# Patient Record
Sex: FEMALE | Race: WHITE | NOT HISPANIC OR LATINO | Employment: OTHER | URBAN - METROPOLITAN AREA
[De-identification: names, ages, dates, MRNs, and addresses within clinical notes are randomized per-mention and may not be internally consistent; named-entity substitution may affect disease eponyms.]

---

## 2019-05-22 ENCOUNTER — TELEPHONE (OUTPATIENT)
Dept: VASCULAR SURGERY | Facility: CLINIC | Age: 74
End: 2019-05-22

## 2019-05-22 DIAGNOSIS — Z01.818 PRE-OP EVALUATION: Primary | ICD-10-CM

## 2019-05-22 NOTE — TELEPHONE ENCOUNTER
Contacted patient to schedule appt with Dr. Williamson for carotid workup. Pt states she was referred to Dr. Williamson by Dr. Sergio Leal. Appts scheduled, pt verified. Appt letter placed in mail.

## 2019-06-11 ENCOUNTER — HOSPITAL ENCOUNTER (OUTPATIENT)
Dept: VASCULAR SURGERY | Facility: CLINIC | Age: 74
Discharge: HOME | End: 2019-06-11
Attending: SURGERY
Payer: MEDICARE

## 2019-06-11 ENCOUNTER — OFFICE VISIT (OUTPATIENT)
Dept: VASCULAR SURGERY | Facility: CLINIC | Age: 74
End: 2019-06-11
Payer: MEDICARE

## 2019-06-11 VITALS
TEMPERATURE: 98 F | WEIGHT: 185.19 LBS | HEART RATE: 85 BPM | SYSTOLIC BLOOD PRESSURE: 149 MMHG | BODY MASS INDEX: 28.07 KG/M2 | DIASTOLIC BLOOD PRESSURE: 71 MMHG | HEIGHT: 68 IN

## 2019-06-11 DIAGNOSIS — I65.23 BILATERAL CAROTID ARTERY STENOSIS: Primary | ICD-10-CM

## 2019-06-11 DIAGNOSIS — I65.23 BILATERAL CAROTID ARTERY STENOSIS: ICD-10-CM

## 2019-06-11 DIAGNOSIS — Z01.818 PRE-OP EVALUATION: ICD-10-CM

## 2019-06-11 PROCEDURE — 99205 OFFICE O/P NEW HI 60 MIN: CPT | Mod: S$PBB,,, | Performed by: SURGERY

## 2019-06-11 PROCEDURE — 99999 PR PBB SHADOW E&M-EST. PATIENT-LVL III: CPT | Mod: PBBFAC,,, | Performed by: SURGERY

## 2019-06-11 PROCEDURE — 93880 PR DUPLEX SCAN EXTRACRANIAL,BILAT: ICD-10-PCS | Mod: 26,S$PBB,, | Performed by: SURGERY

## 2019-06-11 PROCEDURE — 99213 OFFICE O/P EST LOW 20 MIN: CPT | Mod: PBBFAC,25 | Performed by: SURGERY

## 2019-06-11 PROCEDURE — 93880 EXTRACRANIAL BILAT STUDY: CPT | Mod: PBBFAC | Performed by: SURGERY

## 2019-06-11 PROCEDURE — 99205 PR OFFICE/OUTPT VISIT, NEW, LEVL V, 60-74 MIN: ICD-10-PCS | Mod: S$PBB,,, | Performed by: SURGERY

## 2019-06-11 PROCEDURE — 99999 PR PBB SHADOW E&M-EST. PATIENT-LVL III: ICD-10-PCS | Mod: PBBFAC,,, | Performed by: SURGERY

## 2019-06-11 PROCEDURE — 93880 EXTRACRANIAL BILAT STUDY: CPT | Mod: 26,S$PBB,, | Performed by: SURGERY

## 2019-06-11 RX ORDER — IMIQUIMOD 12.5 MG/.25G
CREAM TOPICAL
COMMUNITY
Start: 2019-05-07 | End: 2021-12-15

## 2019-06-11 RX ORDER — CANAGLIFLOZIN 300 MG/1
300 TABLET, FILM COATED ORAL DAILY
COMMUNITY
Start: 2019-06-06

## 2019-06-11 RX ORDER — FLUOXETINE HYDROCHLORIDE 20 MG/1
20 CAPSULE ORAL DAILY
COMMUNITY
Start: 2019-05-02

## 2019-06-11 RX ORDER — BEPOTASTINE BESILATE 15 MG/ML
SOLUTION/ DROPS OPHTHALMIC
COMMUNITY
Start: 2019-05-10 | End: 2023-03-15 | Stop reason: ALTCHOICE

## 2019-06-11 RX ORDER — BUPROPION HYDROCHLORIDE 150 MG/1
TABLET ORAL DAILY
COMMUNITY
Start: 2019-06-10 | End: 2021-02-23

## 2019-06-11 RX ORDER — LEVOTHYROXINE SODIUM 100 UG/1
100 TABLET ORAL
COMMUNITY
Start: 2019-05-24 | End: 2019-09-11

## 2019-06-11 RX ORDER — METFORMIN HYDROCHLORIDE 500 MG/1
500 TABLET, EXTENDED RELEASE ORAL 4 TIMES DAILY PRN
COMMUNITY
Start: 2019-05-25

## 2019-06-11 RX ORDER — LIOTHYRONINE SODIUM 5 UG/1
5 TABLET ORAL DAILY
COMMUNITY
Start: 2019-05-24

## 2019-06-11 RX ORDER — ESTRADIOL 10 UG/1
TABLET VAGINAL DAILY
COMMUNITY
Start: 2019-05-23

## 2019-06-11 RX ORDER — CHOLECALCIFEROL (VITAMIN D3) 25 MCG
1000 TABLET ORAL DAILY
COMMUNITY

## 2019-06-11 RX ORDER — ZOLPIDEM TARTRATE 6.25 MG/1
6.25 TABLET, FILM COATED, EXTENDED RELEASE ORAL NIGHTLY PRN
COMMUNITY
Start: 2019-06-10 | End: 2021-02-23 | Stop reason: SDUPTHER

## 2019-06-11 RX ORDER — ASPIRIN 81 MG/1
81 TABLET ORAL DAILY
COMMUNITY

## 2019-06-11 RX ORDER — SPIRONOLACTONE 100 MG/1
50 TABLET, FILM COATED ORAL DAILY
COMMUNITY
Start: 2019-06-03 | End: 2023-07-14

## 2019-06-11 RX ORDER — EZETIMIBE 10 MG/1
TABLET ORAL DAILY
COMMUNITY
Start: 2019-05-16 | End: 2023-10-23

## 2019-06-11 NOTE — PROGRESS NOTES
REFERRING PHYSICIAN:  Dr. Sergio Leal (vascular surgeon, [place]).    HISTORY OF PRESENT ILLNESS:  A 74-year-old female recently relocated from   [place] to [place], sent for evaluation of carotid artery disease.  She has   no history of stroke, TIA or amaurosis.  Recent CT scan done for other purposes   incidentally showed significant left common carotid artery disease, per history   stenotic versus occluded (I do not have these images).    She takes aspirin only intermittently.  She is on statin; however, her most   recent LDL is 109.    PAST MEDICAL HISTORY:  1.  Extramammary Paget's disease.  2.  Diabetes.    FAMILY HISTORY:  Positive for colon cancer.    SOCIAL HISTORY:  She is a nonsmoker.    MEDICATIONS:  Include aspirin taken three to four times a day 81 mg and Zetia.    See EPIC for full list.    ALLERGIES:  None.    REVIEW OF SYSTEMS:  Denies postprandial pain or DVT.  All other systems   including eyes, ENT, respiratory, musculoskeletal, psychiatric, heme, lymph,   allergy and immune are negative.    PHYSICAL EXAMINATION:  VITAL SIGNS:  See nursing notes.  GENERAL:  She is in no acute distress.  RESPIRATORY:  Normal effort.  Clear to auscultation.  CARDIAC:  Regular rate and rhythm, nondisplaced PMI, no murmur.  VASCULAR:  2+ radial and brachial pulses.  ABDOMEN:  No masses or tenderness.  No hepatosplenomegaly.  Aorta cannot be   palpated.  EYES:  Normal conjunctivae and lids.  ENT:  Fair dentition.  NECK:  No JVD or thyromegaly.  MUSCULOSKELETAL:  No kyphosis or scoliosis.  EXTREMITIES:  Without clubbing or cyanosis.  SKIN:  Warm and dry.  NEUROLOGIC:  Alert and oriented x3.  Normal mood and affect.  Midline tongue.    No speech difficulty or hoarseness.  5/5 motor strength in all extremities.    IMAGING:  Carotid duplex shows minimal right carotid stenosis with peak systolic   velocity of 130 and diastolic of 30.  On the left there is a 60% to 70% ICA   stenosis with peak systolic velocity of 183  and diastolic of only 38, ratio of   3.7.  By duplex, there appears to be high grade stenosis versus occlusion of the   left proximal common carotid artery.  Collateralization is noted.    ASSESSMENT:  Moderate left internal carotid disease, with possible proximal severe common   carotid artery stenosis versus occlusion, neurologically asymptomatic.    Given her completely asymptomatic state and only 60% to 70% ICA stenosis would   not treat proximal stenosis de devin.  Thus, I am not going to order additional   imaging (a CTA) to further interrogate this area.    She needs good medical therapy.  I reassured that she does not require a   surgical or interventional therapy at this time.    RECOMMENDATIONS:  1.  Increase aspirin 81 mg to daily dose.  2.  Refer to Dr. Aldo Rao to optimize her treatment of her LDL.  3.  Followup with me in six months for the screening carotid duplex sooner if   clinically indicated.      SEJAL/ANTONIO  dd: 06/11/2019 16:39:54 (CDT)  td: 06/12/2019 05:21:02 (CDT)  Doc ID   #[id number]  Job ID #[id number]    CC: Sergio Leal

## 2019-06-18 PROBLEM — Z78.9 STATIN INTOLERANCE: Status: ACTIVE | Noted: 2019-06-18

## 2019-06-18 PROBLEM — E66.3 OVERWEIGHT (BMI 25.0-29.9): Status: ACTIVE | Noted: 2019-06-18

## 2019-06-18 PROBLEM — E03.4 HYPOTHYROIDISM DUE TO ACQUIRED ATROPHY OF THYROID: Status: ACTIVE | Noted: 2019-06-18

## 2019-06-18 PROBLEM — E08.59 DIABETES MELLITUS DUE TO UNDERLYING CONDITION WITH CIRCULATORY COMPLICATION: Status: ACTIVE | Noted: 2019-06-18

## 2019-06-18 PROBLEM — E78.5 DYSLIPIDEMIA: Status: ACTIVE | Noted: 2019-06-18

## 2019-07-07 NOTE — PROGRESS NOTES
Subjective:   Patient ID:  Juan Antonio Mejia is a 74 y.o. female who presents for evaluation of dyslipidemia    HPI:The patient is here for dyslipidemia/carotid disease. The patient has no chest pain, SOB, TIA, palpitations, syncope or pre-syncope.Patient does not exercise.Recent .Says BP typically a lot lower but none taken recently.        Review of Systems   Constitution: Negative for chills, decreased appetite, diaphoresis, fever, malaise/fatigue, night sweats, weight gain and weight loss.   HENT: Negative for congestion, hoarse voice, nosebleeds, sore throat and tinnitus.    Eyes: Negative for blurred vision, double vision, vision loss in left eye, vision loss in right eye, visual disturbance and visual halos.   Cardiovascular: Negative for chest pain, claudication, cyanosis, dyspnea on exertion, irregular heartbeat, leg swelling, near-syncope, orthopnea, palpitations, paroxysmal nocturnal dyspnea and syncope.   Respiratory: Negative for cough, hemoptysis, shortness of breath, sleep disturbances due to breathing, snoring, sputum production and wheezing.    Endocrine: Negative for cold intolerance, heat intolerance, polydipsia, polyphagia and polyuria.   Hematologic/Lymphatic: Negative for adenopathy and bleeding problem. Does not bruise/bleed easily.   Skin: Negative for color change, dry skin, flushing, itching, nail changes, poor wound healing, rash, skin cancer, suspicious lesions and unusual hair distribution.   Musculoskeletal: Negative for arthritis, back pain, falls, gout, joint pain, joint swelling, muscle cramps, muscle weakness, myalgias and stiffness.   Gastrointestinal: Negative for abdominal pain, anorexia, change in bowel habit, constipation, diarrhea, dysphagia, heartburn, hematemesis, hematochezia, melena and vomiting.   Genitourinary: Negative for decreased libido, dysuria, hematuria, hesitancy and urgency.   Neurological: Negative for excessive daytime sleepiness, dizziness, focal weakness,  "headaches, light-headedness, loss of balance, numbness, paresthesias, seizures, sensory change, tremors, vertigo and weakness.   Psychiatric/Behavioral: Negative for altered mental status, depression, hallucinations, memory loss, substance abuse and suicidal ideas. The patient does not have insomnia and is not nervous/anxious.    Allergic/Immunologic: Negative for environmental allergies and hives.       Objective: BP (!) 145/83   Pulse 83   Ht 5' 8" (1.727 m)   Wt 84.9 kg (187 lb 2.7 oz)   BMI 28.46 kg/m²      Physical Exam   Constitutional: She is oriented to person, place, and time. She appears well-developed and well-nourished.   HENT:   Head: Normocephalic.   Eyes: Pupils are equal, round, and reactive to light. EOM are normal.   Neck: Normal range of motion. Normal carotid pulses, no hepatojugular reflux and no JVD present. Carotid bruit is not present. No thyromegaly present.   Cardiovascular: Normal rate, regular rhythm, normal heart sounds and intact distal pulses. Exam reveals no gallop and no friction rub.   No murmur heard.  Pulmonary/Chest: Effort normal and breath sounds normal. No tachypnea. No respiratory distress. She has no wheezes. She has no rales. She exhibits no tenderness.   Abdominal: Soft. Bowel sounds are normal. She exhibits no distension and no mass. There is no tenderness. There is no rebound and no guarding.   Musculoskeletal: Normal range of motion. She exhibits no edema or tenderness.   Lymphadenopathy:     She has no cervical adenopathy.   Neurological: She is alert and oriented to person, place, and time. No cranial nerve deficit. Coordination normal.   Skin: Skin is warm. No rash noted. No erythema.   Psychiatric: She has a normal mood and affect. Her behavior is normal. Judgment and thought content normal.       Assessment:     1. Dyslipidemia    2. Statin intolerance    3. Hypothyroidism due to acquired atrophy of thyroid    4. Overweight (BMI 25.0-29.9)    5. Diabetes " mellitus due to underlying condition with other circulatory complication, unspecified whether long term insulin use    6. Bilateral carotid artery stenosis    7. Vitamin D deficiency    8. At risk for coronary artery disease        Plan:   Discussed diet , achieving and maintaining ideal body weight, and exercise.   We reviewed meds in detail.  Reassured-Discussed Goals, options, plans  Discussed CAC testing, options for lipids etc  Omega 3 > 800/d EPA/DHA  Discussed Co Q 10 200-400 mg per day  Discussed adding [id number] if higher statin not tolerated  Co Q 10 200 mg per day and 2 weeks later start Prava 40 mg at nite  Will need D if low  Stress in September if CAC high  Let us know if BP > 130/80 and will start Losartan  Juan Antonio was seen today for carotid artery disease.    Diagnoses and all orders for this visit:    Dyslipidemia  -     Lipid panel; Standing  -     Comprehensive metabolic panel; Standing  -     TSH; Standing  -     pravastatin (PRAVACHOL) 40 MG tablet; Take 1 tablet (40 mg total) by mouth once daily.  -     CT Cardiac Scoring; Future; Expected date: 07/09/2019  -     CT Cardiac Scoring; Future; Expected date: 07/08/2019    Statin intolerance  -     pravastatin (PRAVACHOL) 40 MG tablet; Take 1 tablet (40 mg total) by mouth once daily.  -     CT Cardiac Scoring; Future; Expected date: 07/09/2019  -     CK; Standing  -     Vitamin D; Future; Expected date: 07/09/2019  -     CT Cardiac Scoring; Future; Expected date: 07/08/2019    Hypothyroidism due to acquired atrophy of thyroid  -     TSH; Standing  -     T4, free; Standing    Overweight (BMI 25.0-29.9)    Diabetes mellitus due to underlying condition with other circulatory complication, unspecified whether long term insulin use  -     CT Cardiac Scoring; Future; Expected date: 07/08/2019  -     Hemoglobin A1c; Future; Expected date: 07/08/2019    Bilateral carotid artery stenosis  -     Lipid panel; Standing  -     Comprehensive metabolic panel;  Standing  -     CT Cardiac Scoring; Future; Expected date: 07/09/2019    Vitamin D deficiency  -     Vitamin D; Future; Expected date: 07/09/2019    At risk for coronary artery disease  -     CT Cardiac Scoring; Future; Expected date: 07/09/2019  -     CT Cardiac Scoring; Future; Expected date: 07/08/2019            Follow up in about 15 months (around 10/8/2020) for with labs; labs 12 weeks; CAC and D soon.

## 2019-07-08 ENCOUNTER — OFFICE VISIT (OUTPATIENT)
Dept: CARDIOLOGY | Facility: CLINIC | Age: 74
End: 2019-07-08
Payer: MEDICARE

## 2019-07-08 VITALS
HEIGHT: 68 IN | BODY MASS INDEX: 28.37 KG/M2 | SYSTOLIC BLOOD PRESSURE: 145 MMHG | WEIGHT: 187.19 LBS | HEART RATE: 83 BPM | DIASTOLIC BLOOD PRESSURE: 83 MMHG

## 2019-07-08 DIAGNOSIS — E78.5 DYSLIPIDEMIA: Primary | ICD-10-CM

## 2019-07-08 DIAGNOSIS — Z91.89 AT RISK FOR CORONARY ARTERY DISEASE: ICD-10-CM

## 2019-07-08 DIAGNOSIS — E66.3 OVERWEIGHT (BMI 25.0-29.9): ICD-10-CM

## 2019-07-08 DIAGNOSIS — I65.23 BILATERAL CAROTID ARTERY STENOSIS: ICD-10-CM

## 2019-07-08 DIAGNOSIS — E55.9 VITAMIN D DEFICIENCY: ICD-10-CM

## 2019-07-08 DIAGNOSIS — E08.59 DIABETES MELLITUS DUE TO UNDERLYING CONDITION WITH OTHER CIRCULATORY COMPLICATION, UNSPECIFIED WHETHER LONG TERM INSULIN USE: ICD-10-CM

## 2019-07-08 DIAGNOSIS — E03.4 HYPOTHYROIDISM DUE TO ACQUIRED ATROPHY OF THYROID: ICD-10-CM

## 2019-07-08 DIAGNOSIS — Z78.9 STATIN INTOLERANCE: ICD-10-CM

## 2019-07-08 PROCEDURE — 99215 OFFICE O/P EST HI 40 MIN: CPT | Mod: PBBFAC | Performed by: INTERNAL MEDICINE

## 2019-07-08 PROCEDURE — 99999 PR PBB SHADOW E&M-EST. PATIENT-LVL V: ICD-10-PCS | Mod: PBBFAC,,, | Performed by: INTERNAL MEDICINE

## 2019-07-08 PROCEDURE — 99999 PR PBB SHADOW E&M-EST. PATIENT-LVL V: CPT | Mod: PBBFAC,,, | Performed by: INTERNAL MEDICINE

## 2019-07-08 PROCEDURE — 99204 OFFICE O/P NEW MOD 45 MIN: CPT | Mod: S$PBB,,, | Performed by: INTERNAL MEDICINE

## 2019-07-08 PROCEDURE — 99204 PR OFFICE/OUTPT VISIT, NEW, LEVL IV, 45-59 MIN: ICD-10-PCS | Mod: S$PBB,,, | Performed by: INTERNAL MEDICINE

## 2019-07-08 RX ORDER — PRAVASTATIN SODIUM 40 MG/1
40 TABLET ORAL DAILY
Qty: 90 TABLET | Refills: 3 | Status: SHIPPED | OUTPATIENT
Start: 2019-07-08 | End: 2020-07-07

## 2019-07-08 NOTE — PATIENT INSTRUCTIONS
Discussed diet , achieving and maintaining ideal body weight, and exercise.   We reviewed meds in detail.  Reassured-Discussed Goals, options, plans  Discussed CAC testing, options for lipids etc  Omega 3 > 800/d EPA/DHA  Discussed Co Q 10 200-400 mg per day to allow for statins  Discussed adding [id number] if higher statin not tolerated  Co Q 10 200 mg per day and 2 weeks later start Prava 40 mg at nite  Will need D if low  Stress in September if CAC high  Let us know if BP > 130/80 and will start Losartan

## 2019-07-08 NOTE — LETTER
July 8, 2019      SANTI Williamson III, MD  [address]  [facility] [address]           [facility]  [address]  [facility] [address]  Phone: [phone]          Patient: Juan Antonio Mejia   MR Number: [id number]   YOB: 1945   Date of Visit: 7/8/2019       Dear Dr. SANTI Williamson III:    Thank you for referring Juan Antonio Mejia to me for evaluation. Attached you will find relevant portions of my assessment and plan of care.    If you have questions, please do not hesitate to call me. I look forward to following Juan Antonio Mejia along with you.    Sincerely,    Jeff Rao MD    Enclosure  CC:  No Recipients    If you would like to receive this communication electronically, please contact [email or web address] or [phone] to request more information on [employer] Link access.    For providers and/or their staff who would like to refer a patient to [facility], please contact us through our one-stop-shop provider referral line, [facility], at [phone].    If you feel you have received this communication in error or would no longer like to receive these types of communications, please e-mail [email or web address]

## 2019-07-15 ENCOUNTER — OFFICE VISIT (OUTPATIENT)
Dept: OBSTETRICS AND GYNECOLOGY | Facility: CLINIC | Age: 74
End: 2019-07-15
Payer: MEDICARE

## 2019-07-15 VITALS
SYSTOLIC BLOOD PRESSURE: 142 MMHG | HEIGHT: 68 IN | BODY MASS INDEX: 28.57 KG/M2 | DIASTOLIC BLOOD PRESSURE: 66 MMHG | WEIGHT: 188.5 LBS

## 2019-07-15 DIAGNOSIS — N89.8 VAGINAL IRRITATION: Primary | ICD-10-CM

## 2019-07-15 DIAGNOSIS — Z00.00 ROUTINE MEDICAL EXAM: ICD-10-CM

## 2019-07-15 PROCEDURE — 87480 CANDIDA DNA DIR PROBE: CPT

## 2019-07-15 PROCEDURE — 87510 GARDNER VAG DNA DIR PROBE: CPT

## 2019-07-15 PROCEDURE — G0101 PR CA SCREEN;PELVIC/BREAST EXAM: ICD-10-PCS | Mod: S$GLB,,, | Performed by: OBSTETRICS & GYNECOLOGY

## 2019-07-15 PROCEDURE — G0101 CA SCREEN;PELVIC/BREAST EXAM: HCPCS | Mod: S$GLB,,, | Performed by: OBSTETRICS & GYNECOLOGY

## 2019-07-15 RX ORDER — BIMATOPROST 0.3 MG/ML
SOLUTION/ DROPS OPHTHALMIC NIGHTLY
COMMUNITY
Start: 2019-06-21 | End: 2023-11-29

## 2019-07-15 RX ORDER — SEMAGLUTIDE 1.34 MG/ML
INJECTION, SOLUTION SUBCUTANEOUS WEEKLY
COMMUNITY
Start: 2019-06-19

## 2019-07-15 NOTE — PROGRESS NOTES
CC: 75 yo here to establish care    HPI: Christiano is overall well today.  She is here today to establish care. She has a history of extra mammary pagets disease and is followed by an oncologist in [place]. She would like to have a gynecologist in [place] in the event that she needs something, but she is primarily managed in [place]. She is up to date on other screening including mammogram, colonoscopy, etc. Having some vulvar itching and wanted to be evaluated. Also needs a picture for her physician in [facility] ([facility] She is currently being treated with aldara and overall having a good response.     Past Medical History:   Diagnosis Date    Diabetes     metformin and diet controlled    Paget disease, extra mammary     affecting vulva       Past Surgical History:   Procedure Laterality Date     SECTION         OB History    None         Current Outpatient Medications on File Prior to Visit   Medication Sig Dispense Refill    aspirin (ECOTRIN) 81 MG EC tablet Take 81 mg by mouth once daily.       BELVIQ 10 mg Tab       BEPREVE 1.5 % Drop       buPROPion (WELLBUTRIN XL) 150 MG TB24 tablet once daily.       ezetimibe (ZETIA) 10 mg tablet once daily.       FLUoxetine 20 MG capsule 20 mg once daily.       imiquimod (ALDARA) 5 % cream       INVOKANA 300 mg Tab tablet 300 mg once daily.       LATISSE 0.03 % ophthalmic solution       levothyroxine (SYNTHROID) 100 MCG tablet 100 mcg before breakfast.       liothyronine (CYTOMEL) 5 MCG Tab       metFORMIN (GLUCOPHAGE-XR) 500 MG 24 hr tablet 500 mg 4 (four) times daily as needed.       OZEMPIC 0.25 mg or 0.5 mg(2 mg/1.5 mL) PnIj       pravastatin (PRAVACHOL) 40 MG tablet Take 1 tablet (40 mg total) by mouth once daily. 90 tablet 3    spironolactone (ALDACTONE) 100 MG tablet       vitamin D (VITAMIN D3) 1000 units Tab Take 1,000 Units by mouth once daily.      YUVAFEM 10 mcg Tab once daily.       zolpidem (AMBIEN CR) 6.25 MG CR tablet  "6.25 mg nightly as needed.        No current facility-administered medications on file prior to visit.          ROS:  GENERAL: Denies weight gain or weight loss. Feeling well overall.   SKIN: See HPI  HEAD: Denies head injury or headache.   CHEST: Denies chest pain or shortness of breath.   CARDIOVASCULAR: Denies palpitations or left sided chest pain.   ABDOMEN: No abdominal pain   REPRODUCTIVE: See HPI.   HEMATOLOGIC: No easy bruisability or excessive bleeding.     Physical Exam:   BP (!) 142/66   Ht 5' 8" (1.727 m)   Wt 85.5 kg (188 lb 7.9 oz)   BMI 28.66 kg/m²   General: No distress, well appearing  HEENT: normocephalic, atraumatic   Heart: Regular rate  Lungs: No increased work of breathing  MS: lower extremeties symmetrical, no edema  Pelvic Exam:     GENITALIA: There is diffuse erythema of the labia minora and majora with some overlying white patchy areas consistent with pagets disease of the vulva. Per patient, disease is currently under good control with aldara. Picture taken with patients permission for her to send to her gyn oncologist in [facility]. There is also thinning of the labia and narrowing of the introitus.    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 Currently being managed by gyn oncology in [place]. Here to establish care.    1. Offered referral to gyn oncology here at [facility]. She would like to continue vulvar care with her gyn oncologist in [place].  2. Affirm collected and sent      Anne Brown MD  Obstetrics and Gynecology  [facility]    "

## 2019-07-16 ENCOUNTER — TELEPHONE (OUTPATIENT)
Dept: OBSTETRICS AND GYNECOLOGY | Facility: CLINIC | Age: 74
End: 2019-07-16

## 2019-07-16 ENCOUNTER — TELEPHONE (OUTPATIENT)
Dept: VASCULAR SURGERY | Facility: CLINIC | Age: 74
End: 2019-07-16

## 2019-07-16 LAB
BACTERIAL VAGINOSIS DNA: NEGATIVE
CANDIDA GLABRATA DNA: NEGATIVE
CANDIDA KRUSEI DNA: NEGATIVE
CANDIDA RRNA VAG QL PROBE: POSITIVE
T VAGINALIS RRNA GENITAL QL PROBE: NEGATIVE

## 2019-07-16 RX ORDER — FLUCONAZOLE 150 MG/1
150 TABLET ORAL
Qty: 2 TABLET | Refills: 0 | Status: SHIPPED | OUTPATIENT
Start: 2019-07-16 | End: 2021-06-28 | Stop reason: SDUPTHER

## 2019-07-16 NOTE — TELEPHONE ENCOUNTER
Contacted patient in response to missed call. Pt states she needs to reschedule appts that were missed on Friday for CT calcium scoring and lab. Notified patient that those tests were ordered by Dr. Rao. Pt states she mixed up Dr. Rao and Dr. Williamson and states she needs to schedule FU with Dr. Williamson. Notified patient she is due to be seen in vascular surgery for a 6 month FU which will not be until December 2019 and that patient will received recall letter in mail to schedule appt as vascular surgery schedule does not go past four months. Pt verbalized understanding and states she will call to schedule when she receives letter. Pt's call transferred to phone room operators to connect with Dr. Rao's clinic.

## 2019-07-16 NOTE — TELEPHONE ENCOUNTER
Left voice message for patient to schedule appointment from referral to Internal Medicine (Dr Angelika Alvarez).  Frankie WHITE  [phone]

## 2019-07-17 ENCOUNTER — TELEPHONE (OUTPATIENT)
Dept: OBSTETRICS AND GYNECOLOGY | Facility: CLINIC | Age: 74
End: 2019-07-17

## 2019-07-17 NOTE — TELEPHONE ENCOUNTER
Called patient to give the results of her swab. Let patient know that medication was sent over to her pharmacy and she should take one today, and another in 72 hours if needed. Patient verbalized understanding.    -Domenica

## 2019-07-17 NOTE — TELEPHONE ENCOUNTER
----- Message from Anne Brown MD sent at 7/16/2019  4:49 PM CDT -----  Do you mind letting her know that her swab showed a yeast infection? I will send a prescription for diflucan for her. She can take 1 pill today and another in 72 hours if needed. Thanks!

## 2019-07-17 NOTE — TELEPHONE ENCOUNTER
----- Message from Karine Avina sent at 7/17/2019  3:35 PM CDT -----  Contact: self  Pt is returning a phone call. Pt can be reached at [phone].

## 2019-07-23 ENCOUNTER — HOSPITAL ENCOUNTER (OUTPATIENT)
Dept: RADIOLOGY | Facility: HOSPITAL | Age: 74
Discharge: HOME | End: 2019-07-23
Attending: INTERNAL MEDICINE
Payer: MEDICARE

## 2019-07-23 DIAGNOSIS — E08.59 DIABETES MELLITUS DUE TO UNDERLYING CONDITION WITH OTHER CIRCULATORY COMPLICATION, UNSPECIFIED WHETHER LONG TERM INSULIN USE: ICD-10-CM

## 2019-07-23 DIAGNOSIS — Z78.9 STATIN INTOLERANCE: ICD-10-CM

## 2019-07-23 DIAGNOSIS — E78.5 DYSLIPIDEMIA: ICD-10-CM

## 2019-07-23 DIAGNOSIS — Z91.89 AT RISK FOR CORONARY ARTERY DISEASE: ICD-10-CM

## 2019-07-23 PROBLEM — R93.1 AGATSTON CAC SCORE, >400: Status: ACTIVE | Noted: 2019-07-23

## 2019-07-23 PROCEDURE — 75571 CT HRT W/O DYE W/CA TEST: CPT | Mod: 26,,, | Performed by: RADIOLOGY

## 2019-07-23 PROCEDURE — 75571 CT CALCIUM SCORING CARDIAC: ICD-10-PCS | Mod: 26,,, | Performed by: RADIOLOGY

## 2019-07-23 PROCEDURE — 75571 CT HRT W/O DYE W/CA TEST: CPT | Mod: TC

## 2019-08-05 ENCOUNTER — TELEPHONE (OUTPATIENT)
Dept: CARDIOLOGY | Facility: CLINIC | Age: 74
End: 2019-08-05

## 2019-08-05 DIAGNOSIS — R93.1 ELEVATED CORONARY ARTERY CALCIUM SCORE: Primary | ICD-10-CM

## 2019-08-08 ENCOUNTER — LAB VISIT (OUTPATIENT)
Dept: LAB | Facility: HOSPITAL | Age: 74
End: 2019-08-08
Attending: INTERNAL MEDICINE
Payer: MEDICARE

## 2019-08-08 DIAGNOSIS — E03.4 HYPOTHYROIDISM DUE TO ACQUIRED ATROPHY OF THYROID: ICD-10-CM

## 2019-08-08 DIAGNOSIS — Z78.9 STATIN INTOLERANCE: ICD-10-CM

## 2019-08-08 DIAGNOSIS — I65.23 BILATERAL CAROTID ARTERY STENOSIS: ICD-10-CM

## 2019-08-08 DIAGNOSIS — E78.5 DYSLIPIDEMIA: ICD-10-CM

## 2019-08-08 LAB
ALBUMIN SERPL BCP-MCNC: 3.9 G/DL (ref 3.5–5.2)
ALP SERPL-CCNC: 81 U/L (ref 55–135)
ALT SERPL W/O P-5'-P-CCNC: 26 U/L (ref 10–44)
ANION GAP SERPL CALC-SCNC: 10 MMOL/L (ref 8–16)
AST SERPL-CCNC: 18 U/L (ref 10–40)
BILIRUB SERPL-MCNC: 0.4 MG/DL (ref 0.1–1)
BUN SERPL-MCNC: 12 MG/DL (ref 8–23)
CALCIUM SERPL-MCNC: 9.8 MG/DL (ref 8.7–10.5)
CHLORIDE SERPL-SCNC: 105 MMOL/L (ref 95–110)
CHOLEST SERPL-MCNC: 131 MG/DL (ref 120–199)
CHOLEST/HDLC SERPL: 2.7 {RATIO} (ref 2–5)
CK SERPL-CCNC: 51 U/L (ref 20–180)
CO2 SERPL-SCNC: 26 MMOL/L (ref 23–29)
CREAT SERPL-MCNC: 0.8 MG/DL (ref 0.5–1.4)
EST. GFR  (AFRICAN AMERICAN): >60 ML/MIN/1.73 M^2
EST. GFR  (NON AFRICAN AMERICAN): >60 ML/MIN/1.73 M^2
GLUCOSE SERPL-MCNC: 96 MG/DL (ref 70–110)
HDLC SERPL-MCNC: 49 MG/DL (ref 40–75)
HDLC SERPL: 37.4 % (ref 20–50)
LDLC SERPL CALC-MCNC: 58.8 MG/DL (ref 63–159)
NONHDLC SERPL-MCNC: 82 MG/DL
POTASSIUM SERPL-SCNC: 4.6 MMOL/L (ref 3.5–5.1)
PROT SERPL-MCNC: 7.3 G/DL (ref 6–8.4)
SODIUM SERPL-SCNC: 141 MMOL/L (ref 136–145)
T4 FREE SERPL-MCNC: 0.96 NG/DL (ref 0.71–1.51)
TRIGL SERPL-MCNC: 116 MG/DL (ref 30–150)
TSH SERPL DL<=0.005 MIU/L-ACNC: 0.16 UIU/ML (ref 0.4–4)

## 2019-08-08 PROCEDURE — 84439 ASSAY OF FREE THYROXINE: CPT

## 2019-08-08 PROCEDURE — 84443 ASSAY THYROID STIM HORMONE: CPT

## 2019-08-08 PROCEDURE — 82550 ASSAY OF CK (CPK): CPT

## 2019-08-08 PROCEDURE — 80053 COMPREHEN METABOLIC PANEL: CPT

## 2019-08-08 PROCEDURE — 80061 LIPID PANEL: CPT

## 2019-08-08 PROCEDURE — 36415 COLL VENOUS BLD VENIPUNCTURE: CPT | Mod: PO

## 2019-08-09 ENCOUNTER — PATIENT MESSAGE (OUTPATIENT)
Dept: CARDIOLOGY | Facility: CLINIC | Age: 74
End: 2019-08-09

## 2019-08-12 ENCOUNTER — TELEPHONE (OUTPATIENT)
Dept: CARDIOLOGY | Facility: CLINIC | Age: 74
End: 2019-08-12

## 2019-08-12 ENCOUNTER — PATIENT MESSAGE (OUTPATIENT)
Dept: CARDIOLOGY | Facility: CLINIC | Age: 74
End: 2019-08-12

## 2019-08-13 NOTE — TELEPHONE ENCOUNTER
Returned patient's call from this afternoon. She wanted to schedule the stress echo in Sept as Dr. Rao requested. She also had more questions about the calcium score. She will send Dr. Rao a message through [facility].    NICOLE [employer] Staff   Caller: patient called (Today,  1:22 PM)             Rosa the patient is returning your phone called. Please call [phone]. Thank you.

## 2019-08-19 ENCOUNTER — OFFICE VISIT (OUTPATIENT)
Dept: OBSTETRICS AND GYNECOLOGY | Facility: CLINIC | Age: 74
End: 2019-08-19
Payer: MEDICARE

## 2019-08-19 VITALS
BODY MASS INDEX: 26.4 KG/M2 | SYSTOLIC BLOOD PRESSURE: 180 MMHG | HEIGHT: 68 IN | DIASTOLIC BLOOD PRESSURE: 76 MMHG | WEIGHT: 174.19 LBS

## 2019-08-19 DIAGNOSIS — C51.9 PAGET'S DISEASE OF VULVA: Primary | ICD-10-CM

## 2019-08-19 PROCEDURE — 99213 OFFICE O/P EST LOW 20 MIN: CPT | Mod: S$GLB,,, | Performed by: OBSTETRICS & GYNECOLOGY

## 2019-08-19 PROCEDURE — 87481 CANDIDA DNA AMP PROBE: CPT | Mod: 59

## 2019-08-19 PROCEDURE — 87801 DETECT AGNT MULT DNA AMPLI: CPT

## 2019-08-19 PROCEDURE — 99213 PR OFFICE/OUTPT VISIT, EST, LEVL III, 20-29 MIN: ICD-10-PCS | Mod: S$GLB,,, | Performed by: OBSTETRICS & GYNECOLOGY

## 2019-08-19 RX ORDER — MUPIROCIN 20 MG/G
OINTMENT TOPICAL
COMMUNITY
Start: 2019-08-09 | End: 2019-09-11

## 2019-08-19 RX ORDER — LEVOTHYROXINE SODIUM 88 UG/1
88 TABLET ORAL
COMMUNITY
Start: 2019-08-14 | End: 2021-03-23 | Stop reason: SDUPTHER

## 2019-08-19 NOTE — PROGRESS NOTES
CC: 75 yo here for fu visit     HPI: Christiano is overall well today.  She is here for fu visit. She has a history of extra mammary pagets disease and is followed by an oncologist in [place]. She would like to have a gynecologist in [place] in the event that she needs something, but she is primarily managed in [place]. She is up to date on other screening including mammogram, colonoscopy, etc. Having some vulvar itching and wanted to be evaluated. Also needs a picture for her physician in [facility] ([facility] She is currently being treated with aldara and overall having a good response.     Interval history: Here today in follow up. I saw her last and she was diagnosed with yeast infection. She is here today for fu visit. Followed by Dr. Tapia in [place]. He is okay with her coming to this clinic for fu and to obtain picture that she will send to him. Continues on Aldara but nearing end of treatment course.     Had DEXA scan within the last 15 years that was normal. Does not want repeat as she would not do any treatment for osteoperosis. She does not do weight bearing exercises.    She is scheduled to see Dr. Alvarez on  to establish care.     Going to [place] on vacation.     Past Medical History:   Diagnosis Date    Diabetes     metformin and diet controlled    Paget disease, extra mammary     affecting vulva       Past Surgical History:   Procedure Laterality Date     SECTION         OB History    None         Current Outpatient Medications on File Prior to Visit   Medication Sig Dispense Refill    aspirin (ECOTRIN) 81 MG EC tablet Take 81 mg by mouth once daily.       BELVIQ 10 mg Tab       BEPREVE 1.5 % Drop       buPROPion (WELLBUTRIN XL) 150 MG TB24 tablet once daily.       ezetimibe (ZETIA) 10 mg tablet once daily.       fluconazole (DIFLUCAN) 150 MG Tab Take 1 tablet (150 mg total) by mouth every 72 hours as needed. 2 tablet 0    FLUoxetine 20 MG capsule 20 mg once daily.     "   imiquimod (ALDARA) 5 % cream       INVOKANA 300 mg Tab tablet 300 mg once daily.       LATISSE 0.03 % ophthalmic solution       levothyroxine (SYNTHROID) 100 MCG tablet 100 mcg before breakfast.       levothyroxine (SYNTHROID) 88 MCG tablet       liothyronine (CYTOMEL) 5 MCG Tab       metFORMIN (GLUCOPHAGE-XR) 500 MG 24 hr tablet 500 mg 4 (four) times daily as needed.       mupirocin (BACTROBAN) 2 % ointment       OZEMPIC 0.25 mg or 0.5 mg(2 mg/1.5 mL) PnIj       PAZEO 0.7 % Drop       pravastatin (PRAVACHOL) 40 MG tablet Take 1 tablet (40 mg total) by mouth once daily. 90 tablet 3    spironolactone (ALDACTONE) 100 MG tablet       vitamin D (VITAMIN D3) 1000 units Tab Take 1,000 Units by mouth once daily.      YUVAFEM 10 mcg Tab once daily.       zolpidem (AMBIEN CR) 6.25 MG CR tablet 6.25 mg nightly as needed.        No current facility-administered medications on file prior to visit.        ROS:  GENERAL: Denies weight gain or weight loss. Feeling well overall.   SKIN: See HPI  HEAD: Denies head injury or headache.   CHEST: Denies chest pain or shortness of breath.   CARDIOVASCULAR: Denies palpitations or left sided chest pain.   ABDOMEN: No abdominal pain   REPRODUCTIVE: See HPI.   HEMATOLOGIC: No easy bruisability or excessive bleeding.     Physical Exam:   BP (!) 180/76   Ht 5' 8" (1.727 m)   Wt 79 kg (174 lb 2.6 oz)   BMI 26.48 kg/m²   General: No distress, well appearing  HEENT: normocephalic, atraumatic   Heart: Regular rate  Lungs: No increased work of breathing  MS: lower extremeties symmetrical, no edema  Pelvic Exam:     GENITALIA: There is diffuse erythema of the labia minora and majora with some overlying white patchy areas consistent with pagets disease of the vulva. Somewhat improved from prior exam. Picture taken with patients permission for her to send to her gyn oncologist in [place]. There is also thinning of the labia and narrowing of the introitus.    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 Currently being managed by gyn oncology in [place]. Here for fu visit.     1. Offered referral to gyn oncology here at [facility] ([facility] She would like to continue vulvar care with her gyn oncologist in [place], Dr. Tapia. She is currently being managed with Aldara.   2. Affirm collected and sent.  3. BP elevated today. Was 142/66 at prior visit. She feels well today. Scheduled to see Dr. Alvarez to establish care on 09/11.  4. Declines repeat DEXA scan. Recommended weight bearing exercises.   5. Fu for routine exam.     Anne Brown MD  Obstetrics and Gynecology  [facility]

## 2019-08-20 RX ORDER — FLUCONAZOLE 150 MG/1
150 TABLET ORAL
Qty: 2 TABLET | Refills: 0 | Status: SHIPPED | OUTPATIENT
Start: 2019-08-20 | End: 2019-09-11 | Stop reason: SDUPTHER

## 2019-09-11 ENCOUNTER — OFFICE VISIT (OUTPATIENT)
Dept: INTERNAL MEDICINE | Facility: CLINIC | Age: 74
End: 2019-09-11
Payer: MEDICARE

## 2019-09-11 VITALS
OXYGEN SATURATION: 95 % | WEIGHT: 186.75 LBS | SYSTOLIC BLOOD PRESSURE: 140 MMHG | DIASTOLIC BLOOD PRESSURE: 80 MMHG | HEART RATE: 93 BPM | HEIGHT: 68 IN | BODY MASS INDEX: 28.3 KG/M2

## 2019-09-11 DIAGNOSIS — I10 ESSENTIAL HYPERTENSION: Primary | ICD-10-CM

## 2019-09-11 DIAGNOSIS — R73.03 PREDIABETES: ICD-10-CM

## 2019-09-11 DIAGNOSIS — J45.20 MILD INTERMITTENT ASTHMA WITHOUT COMPLICATION: ICD-10-CM

## 2019-09-11 DIAGNOSIS — L81.4 SOLAR LENTIGO: ICD-10-CM

## 2019-09-11 DIAGNOSIS — Z00.00 HEALTHCARE MAINTENANCE: ICD-10-CM

## 2019-09-11 DIAGNOSIS — E03.4 HYPOTHYROIDISM DUE TO ACQUIRED ATROPHY OF THYROID: ICD-10-CM

## 2019-09-11 DIAGNOSIS — E55.9 VITAMIN D DEFICIENCY: ICD-10-CM

## 2019-09-11 DIAGNOSIS — C51.9 PAGET'S DISEASE OF VULVA: ICD-10-CM

## 2019-09-11 DIAGNOSIS — I65.23 BILATERAL CAROTID ARTERY STENOSIS: ICD-10-CM

## 2019-09-11 DIAGNOSIS — F32.A DEPRESSION, UNSPECIFIED DEPRESSION TYPE: ICD-10-CM

## 2019-09-11 DIAGNOSIS — E66.3 OVERWEIGHT (BMI 25.0-29.9): ICD-10-CM

## 2019-09-11 DIAGNOSIS — G47.00 INSOMNIA, UNSPECIFIED TYPE: ICD-10-CM

## 2019-09-11 DIAGNOSIS — E78.5 DYSLIPIDEMIA: ICD-10-CM

## 2019-09-11 DIAGNOSIS — Z23 FLU VACCINE NEED: ICD-10-CM

## 2019-09-11 DIAGNOSIS — Z13.820 SCREENING FOR OSTEOPOROSIS: ICD-10-CM

## 2019-09-11 DIAGNOSIS — Z12.11 COLON CANCER SCREENING: ICD-10-CM

## 2019-09-11 PROBLEM — E11.59 TYPE 2 DIABETES MELLITUS WITH CIRCULATORY DISORDER, WITHOUT LONG-TERM CURRENT USE OF INSULIN: Status: ACTIVE | Noted: 2019-06-18

## 2019-09-11 PROCEDURE — 99214 OFFICE O/P EST MOD 30 MIN: CPT | Mod: PBBFAC | Performed by: INTERNAL MEDICINE

## 2019-09-11 PROCEDURE — 99999 PR PBB SHADOW E&M-EST. PATIENT-LVL IV: ICD-10-PCS | Mod: PBBFAC,,, | Performed by: INTERNAL MEDICINE

## 2019-09-11 PROCEDURE — 99205 PR OFFICE/OUTPT VISIT, NEW, LEVL V, 60-74 MIN: ICD-10-PCS | Mod: S$PBB,,, | Performed by: INTERNAL MEDICINE

## 2019-09-11 PROCEDURE — 99205 OFFICE O/P NEW HI 60 MIN: CPT | Mod: S$PBB,,, | Performed by: INTERNAL MEDICINE

## 2019-09-11 PROCEDURE — 99999 PR PBB SHADOW E&M-EST. PATIENT-LVL IV: CPT | Mod: PBBFAC,,, | Performed by: INTERNAL MEDICINE

## 2019-09-11 RX ORDER — TOPIRAMATE 25 MG/1
25 TABLET ORAL DAILY
COMMUNITY
Start: 2019-09-10 | End: 2021-02-23

## 2019-09-11 NOTE — PROGRESS NOTES
Subjective:       Patient ID: Juan Antonio Mejia is a 74 y.o. female who  has a past medical history of Depression, Diabetes, HLD (hyperlipidemia), Mild intermittent asthma, and Paget disease, extra mammary.    Chief Complaint: Establish Care and Hypertension     History was obtained from the patient and supplemented through chart review.  -The patient has not seen a PCP in our system.  Used to go to .   -Following with Cardiology for HLD.  -Reviewed outside records from Dr. Steph Reyes in [place] regarding preDM.  H/o negative autoimmune workup.    Lived in [place], then [place]. Has some of her care in [place].    HPI    Elevated BP:    No h/o HTN.  Has been on Aldactone 50 for many years as a diuretic. Tolerating meds well.   Home BP: 130-140s  Tobacco: quit many years ago     PreDM:  Well followed with Endocrinology in [place] q6 mo.  Will see in 11/2019.  [place] A1cs 5.9, 6 9-2017.  Currently pt is taking metformin 500 XR daily, Ozempic/Semaglutide, Ivokana/Canagliflozin.     No proteinuria on OSH labs 09/2017  Retinal exams: UTD at [facility], 2 months ago  Foot exams:    No results found for: HGBA1C      HLD:  Is currently taking ASA 81 daily.  History of intolerance to statin (knee pain with Lipitor; started chondroitin).  On Zetia, started pravastatin 40. CAC score very high.  Lab Results   Component Value Date    LDLCALC 58.8 (L) 08/08/2019     The 10-year ASCVD risk score (Choteau EBONI Jr., et al., 2013) is: 23.8%    Values used to calculate the score:      Age: 74 years      Sex: Female      Is Non- : No      Diabetic: No      Tobacco smoker: No      Systolic Blood Pressure: 140 mmHg      Is BP treated: Yes      HDL Cholesterol: 49 mg/dL      Total Cholesterol: 131 mg/dL    Bilateral carotid artery stenosis:  No history of TIA, amaurosis.  Quit smoking at 39 yoa.  Was an incidental finding on CT for Paget's.  On aspirin, statin; unable to tolerate high intensity statin. Sees Cardiology, vascular  surgery.  Recommended medical therapy given moderate dz, asymptomatic.  Carotid CT score high.  Goal LDL close to 50.  Planning on stress test.     Hypothyroidism:  The patient is taking Synthroid decreased 100-> 88 last month, liothyronine 5.  Has repeat TFTs ordered.    Lab Results   Component Value Date    TSH 0.157 (L) 08/08/2019    FREET4 0.96 08/08/2019     Vitamin D deficiency:  Takes OTC supplement.  Vitamin-D 43 .   No results found for: [id number]    Extra-mammary Paget's disease:  Of the vulva.  Follows with OBGYN here and Gyn Onc in [place].  On Aldara.    Obesity:  BMI 28. Has lost weight on Belviq, Topamax, Wellbutrin.  Rx by Endocrinology in [place].     Asthma: Auit smoking at 39 years of age.  Used to be on ProAir, Advair 100-50 b.i.d., Singulair 10 q.h.s.    Depression:  On Wellbutrin 150, fluoxetine 20.     Insomnia:  On Ambien.     Colon polyp:  C scope 07/20/2015, was told to repeat in 5 years.    Solar lentigo:  Will see OSH derm    HCM:  -Had DEXA scan 15 years ago that was normal.  Did not want to repeat since she doesn't want to be on bisphosphonates.  Does not do weight-bearing exercises.  States that she eats yogurt and takes Vit D.  -MMG BI-RADS 2.  03/27/2017  -Prevnar ,   -UTD shingles 2015    Review of Systems   Constitutional: Negative for fever and unexpected weight change.   HENT: Negative for rhinorrhea and sneezing.    Eyes: Negative for redness and itching.   Respiratory: Negative for shortness of breath and wheezing.    Cardiovascular: Negative for chest pain and palpitations.   Gastrointestinal: Negative for abdominal pain and vomiting.   Genitourinary: Negative for dysuria and hematuria.   Musculoskeletal: Positive for arthralgias. Negative for gait problem.   Skin: Negative for color change and rash.   Neurological: Negative for dizziness and light-headedness.   Hematological: Negative for adenopathy.   Psychiatric/Behavioral: Positive for sleep  disturbance. Negative for confusion. The patient is not nervous/anxious.          Past Medical History:   Diagnosis Date    Depression     Diabetes     metformin and diet controlled    HLD (hyperlipidemia)     Mild intermittent asthma     Paget disease, extra mammary     affecting vulva     Past Surgical History:   Procedure Laterality Date     SECTION  1973     Family History   Problem Relation Age of Onset    Colon cancer Mother 79    Glaucoma Mother     Prostate cancer Father     Heart attack Father     Mental retardation Brother     Breast cancer Neg Hx     Diabetes Neg Hx      Social History     Socioeconomic History    Marital status:      Spouse name: Not on file    Number of children: Not on file    Years of education: Not on file    Highest education level: Not on file   Occupational History    Not on file   Social Needs    Financial resource strain: Not on file    Food insecurity:     Worry: Not on file     Inability: Not on file    Transportation needs:     Medical: Not on file     Non-medical: Not on file   Tobacco Use    Smoking status: Former Smoker     Last attempt to quit:      Years since quittin.7    Smokeless tobacco: Never Used   Substance and Sexual Activity    Alcohol use: Not Currently    Drug use: Never    Sexual activity: Not Currently   Lifestyle    Physical activity:     Days per week: Not on file     Minutes per session: Not on file    Stress: Not on file   Relationships    Social connections:     Talks on phone: Not on file     Gets together: Not on file     Attends [employer] service: Not on file     Active member of club or organization: Not on file     Attends meetings of clubs or organizations: Not on file     Relationship status: Not on file   Other Topics Concern    Not on file   Social History Narrative    Not on file     Objective:      Vitals:    19 1524   BP: (!) 140/80   Pulse: 93   SpO2: 95%   Weight: 84.7 kg (186 lb  "11.7 oz)   Height: 5' 8" (1.727 m)      Physical Exam   Constitutional: She appears well-developed and well-nourished. No distress.   HENT:   Head: Normocephalic and atraumatic.   Nose: Nose normal.   Mouth/Throat: Oropharynx is clear and moist. No oropharyngeal exudate.   Eyes: Pupils are equal, round, and reactive to light. EOM are normal. Right eye exhibits no discharge. Left eye exhibits no discharge. No scleral icterus.   Neck: Neck supple. No tracheal deviation present. No thyromegaly present.   Cardiovascular: Normal rate, regular rhythm, normal heart sounds and intact distal pulses.   No murmur heard.  Pulmonary/Chest: Effort normal and breath sounds normal. No respiratory distress. She has no wheezes.   Abdominal: Soft. Bowel sounds are normal. She exhibits no distension. There is no tenderness.   Musculoskeletal: She exhibits no edema or deformity.   Lymphadenopathy:     She has no cervical adenopathy.   Neurological: She is alert. No cranial nerve deficit. Gait normal.   Skin: Skin is warm and dry. Capillary refill takes less than 2 seconds. She is not diaphoretic. No erythema.   Psychiatric: She has a normal mood and affect. Her behavior is normal.         Lab Results   Component Value Date    CHOL 131 08/08/2019    TRIG 116 08/08/2019    HDL 49 08/08/2019    ALT 26 08/08/2019    AST 18 08/08/2019     08/08/2019    K 4.6 08/08/2019     08/08/2019    CREATININE 0.8 08/08/2019    BUN 12 08/08/2019    CO2 26 08/08/2019    TSH 0.157 (L) 08/08/2019       The 10-year ASCVD risk score (Sherita EBONI Jr., et al., 2013) is: 23.8%    Values used to calculate the score:      Age: 74 years      Sex: Female      Is Non- : No      Diabetic: No      Tobacco smoker: No      Systolic Blood Pressure: 140 mmHg      Is BP treated: Yes      HDL Cholesterol: 49 mg/dL      Total Cholesterol: 131 mg/dL    (Imaging have been independently reviewed)  Cardiac CT with high CAC    Assessment:       1. " Essential hypertension    2. Prediabetes    3. Dyslipidemia    4. Bilateral carotid artery stenosis    5. Hypothyroidism due to acquired atrophy of thyroid    6. Vitamin D deficiency    7. Paget's disease of vulva    8. Overweight (BMI 25.0-29.9)    9. Mild intermittent asthma without complication    10. Depression, unspecified depression type    11. Insomnia, unspecified type    12. Colon cancer screening    13. Solar lentigo    14. Healthcare maintenance    15. Flu vaccine need    16. Screening for osteoporosis          Plan:       Juan Antonio was seen today for establish care and hypertension.    Diagnoses and all orders for this visit:    Essential hypertension  Comments:  BP persistently high. On Aldactone 50. Rec ARB d/t preDM. Pt hesitant to start med, requested to discuss with Cards. F/u with Cards next wk. Will msg Dr. Rao.    Prediabetes  Comments:  Managed by [facility] in [place], so will defer labs/A1C. Previous A1Cs at goal. On Metformin, Ozempic, Ivokana.    Dyslipidemia  Comments:  Knee pain with Lipitor. On Pravastatin, ASA. Following with Cards.    Bilateral carotid artery stenosis  Comments:  Medical management with statin, ASA. F/u with vascular, Cards.    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 On Liothyronine 5. Has repeat TFTs ordered.  Follows with Endo in [place].    Vitamin D deficiency  Comments:  On supplement. Will defer labs since she follows with Ce in [place] in 11/2019.    Paget's disease of vulva  Comments:  F/u with OBGYN here and in [place]. On Aldara.    Overweight (BMI 25.0-29.9)  Comments:  On Belviq, Topamax, Wellbutrin.  Follows with Endocrinology in [place].    Mild intermittent asthma without complication  Comments:  no acute issues. UTD on pneumococcal vaccine.    Depression, unspecified depression type  Comments:  Cont Wellbutrin, fluoxetine.    Insomnia, unspecified type  Comments:  Continue Ambien.    Colon cancer  screening  Comments:  7/2015.  Was told to repeat in 5 years.  Refer to GI.  Orders:  -     Ambulatory referral to Gastroenterology    Solar lentigo  Comments:  Will see outside dermatologist.    Healthcare maintenance    Flu vaccine need  Comments:  Advised to obtain vaccine at Pharmacy.    Screening for osteoporosis  Comments:  Patient declined DEXA scan despite discussion of risks and benefits.    Other orders  -     Cancel: CBC auto differential; Future  -     Cancel: Comprehensive metabolic panel; Future  -     Cancel: Lipid panel; Future  -     Cancel: Microalbumin/creatinine urine ratio; Future  -     Cancel: Hemoglobin A1c; Future  -     Cancel: Ambulatory referral to Podiatry  -     Cancel: Diabetic Eye Screening Photo; Future  -     Cancel: Vitamin D; Future  -     Cancel: TSH; Future  -     Cancel: T4, free; Future         Side effects of medication(s) were discussed in detail and patient voiced understanding.  Patient will call back for any issues or complications.     RTC in 3 month(s) or sooner PRN for HTN.  Pt is unsure if she will do  medicine.

## 2019-09-12 ENCOUNTER — PATIENT MESSAGE (OUTPATIENT)
Dept: CARDIOLOGY | Facility: CLINIC | Age: 74
End: 2019-09-12

## 2019-09-12 DIAGNOSIS — I10 HYPERTENSION, UNSPECIFIED TYPE: Primary | ICD-10-CM

## 2019-09-17 ENCOUNTER — HOSPITAL ENCOUNTER (OUTPATIENT)
Dept: CARDIOLOGY | Facility: CLINIC | Age: 74
Discharge: HOME | End: 2019-09-17
Attending: INTERNAL MEDICINE
Payer: MEDICARE

## 2019-09-17 ENCOUNTER — PATIENT MESSAGE (OUTPATIENT)
Dept: CARDIOLOGY | Facility: CLINIC | Age: 74
End: 2019-09-17

## 2019-09-17 VITALS — BODY MASS INDEX: 27.28 KG/M2 | WEIGHT: 180 LBS | HEIGHT: 68 IN

## 2019-09-17 DIAGNOSIS — R93.1 ELEVATED CORONARY ARTERY CALCIUM SCORE: ICD-10-CM

## 2019-09-17 LAB
ASCENDING AORTA: 2.83 CM
BSA FOR ECHO PROCEDURE: 1.98 M2
CV ECHO LV RWT: 0.41 CM
CV STRESS BASE HR: 88 BPM
DIASTOLIC BLOOD PRESSURE: 76 MMHG
DOP CALC LVOT AREA: 3 CM2
DOP CALC LVOT DIAMETER: 1.96 CM
DOP CALC LVOT PEAK VEL: 0.95 M/S
DOP CALC LVOT STROKE VOLUME: 51.99 CM3
DOP CALCLVOT PEAK VEL VTI: 17.24 CM
E WAVE DECELERATION TIME: 255.4 MSEC
E/A RATIO: 0.63
E/E' RATIO: 10.18 M/S
ECHO LV POSTERIOR WALL: 0.84 CM (ref 0.6–1.1)
FRACTIONAL SHORTENING: 36 % (ref 28–44)
INTERVENTRICULAR SEPTUM: 0.81 CM (ref 0.6–1.1)
IVRT: 0.1 MSEC
LA MAJOR: 5.15 CM
LA MINOR: 5.27 CM
LA WIDTH: 2.54 CM
LEFT ATRIUM SIZE: 3.61 CM
LEFT ATRIUM VOLUME INDEX: 20.8 ML/M2
LEFT ATRIUM VOLUME: 40.6 CM3
LEFT INTERNAL DIMENSION IN SYSTOLE: 2.63 CM (ref 2.1–4)
LEFT VENTRICLE DIASTOLIC VOLUME INDEX: 38.65 ML/M2
LEFT VENTRICLE DIASTOLIC VOLUME: 75.54 ML
LEFT VENTRICLE MASS INDEX: 53 G/M2
LEFT VENTRICLE SYSTOLIC VOLUME INDEX: 12.9 ML/M2
LEFT VENTRICLE SYSTOLIC VOLUME: 25.21 ML
LEFT VENTRICULAR INTERNAL DIMENSION IN DIASTOLE: 4.13 CM (ref 3.5–6)
LEFT VENTRICULAR MASS: 102.65 G
LV LATERAL E/E' RATIO: 9.33 M/S
LV SEPTAL E/E' RATIO: 11.2 M/S
MV PEAK A VEL: 0.89 M/S
MV PEAK E VEL: 0.56 M/S
OHS CV CPX 1 MINUTE RECOVERY HEART RATE: 103 BPM
OHS CV CPX 85 PERCENT MAX PREDICTED HEART RATE MALE: 119
OHS CV CPX ESTIMATED METS: 6
OHS CV CPX MAX PREDICTED HEART RATE: 140
OHS CV CPX PATIENT IS FEMALE: 1
OHS CV CPX PATIENT IS MALE: 0
OHS CV CPX PEAK DIASTOLIC BLOOD PRESSURE: 60 MMHG
OHS CV CPX PEAK HEAR RATE: 120 BPM
OHS CV CPX PEAK RATE PRESSURE PRODUCT: NORMAL
OHS CV CPX PEAK SYSTOLIC BLOOD PRESSURE: 196 MMHG
OHS CV CPX PERCENT MAX PREDICTED HEART RATE ACHIEVED: 86
OHS CV CPX RATE PRESSURE PRODUCT PRESENTING: NORMAL
PISA TR MAX VEL: 2.41 M/S
PULM VEIN S/D RATIO: 1.87
PV PEAK D VEL: 0.31 M/S
PV PEAK S VEL: 0.58 M/S
RA MAJOR: 4.57 CM
RA PRESSURE: 8 MMHG
RA WIDTH: 2.38 CM
RIGHT VENTRICULAR END-DIASTOLIC DIMENSION: 3.02 CM
RV TISSUE DOPPLER FREE WALL SYSTOLIC VELOCITY 1 (APICAL 4 CHAMBER VIEW): 13.08 CM/S
SINUS: 3.23 CM
STJ: 2.51 CM
STRESS ECHO POST EXERCISE DUR MIN: 3 MINUTES
STRESS ECHO POST EXERCISE DUR SEC: 48 SECONDS
SYSTOLIC BLOOD PRESSURE: 146 MMHG
TDI LATERAL: 0.06 M/S
TDI SEPTAL: 0.05 M/S
TDI: 0.06 M/S
TR MAX PG: 23 MMHG
TRICUSPID ANNULAR PLANE SYSTOLIC EXCURSION: 1.78 CM
TV REST PULMONARY ARTERY PRESSURE: 31 MMHG

## 2019-09-17 PROCEDURE — 93351 STRESS TTE COMPLETE: CPT | Mod: PBBFAC | Performed by: INTERNAL MEDICINE

## 2019-09-17 PROCEDURE — 93351 ECHOCARDIOGRAM STRESS TEST (CUPID ONLY): ICD-10-PCS | Mod: 26,S$PBB,, | Performed by: INTERNAL MEDICINE

## 2019-09-23 ENCOUNTER — OFFICE VISIT (OUTPATIENT)
Dept: OBSTETRICS AND GYNECOLOGY | Facility: CLINIC | Age: 74
End: 2019-09-23
Payer: MEDICARE

## 2019-09-23 VITALS
WEIGHT: 176.38 LBS | DIASTOLIC BLOOD PRESSURE: 70 MMHG | HEIGHT: 68 IN | BODY MASS INDEX: 26.73 KG/M2 | SYSTOLIC BLOOD PRESSURE: 148 MMHG

## 2019-09-23 DIAGNOSIS — N76.1 CHRONIC VAGINITIS: Primary | ICD-10-CM

## 2019-09-23 PROCEDURE — 99213 PR OFFICE/OUTPT VISIT, EST, LEVL III, 20-29 MIN: ICD-10-PCS | Mod: S$GLB,,, | Performed by: OBSTETRICS & GYNECOLOGY

## 2019-09-23 PROCEDURE — 87661 TRICHOMONAS VAGINALIS AMPLIF: CPT

## 2019-09-23 PROCEDURE — 87481 CANDIDA DNA AMP PROBE: CPT | Mod: 59

## 2019-09-23 PROCEDURE — 99213 OFFICE O/P EST LOW 20 MIN: CPT | Mod: S$GLB,,, | Performed by: OBSTETRICS & GYNECOLOGY

## 2019-09-23 RX ORDER — NYSTATIN AND TRIAMCINOLONE ACETONIDE 100000; 1 [USP'U]/G; MG/G
CREAM TOPICAL
Qty: 30 G | Refills: 1 | Status: SHIPPED | OUTPATIENT
Start: 2019-09-23 | End: 2023-03-15 | Stop reason: ALTCHOICE

## 2019-09-23 NOTE — PROGRESS NOTES
CC: 73 yo here for fu visit     HPI: Christiano is overall well today.  She is here for fu visit. She has a history of extra mammary pagets disease and is followed by an oncologist in [place]. She would like to have a gynecologist in [place] in the event that she needs something, but she is primarily managed in [place]. She is up to date on other screening including mammogram, colonoscopy, etc. Having some vulvar itching and wanted to be evaluated. Also needs a picture for her physician in [facility] She is currently being treated with aldara and overall having a good response.     Interval history: Here today in follow up. I saw her last and she was diagnosed with yeast infection (both prior visits). Today is overall asymptomatic. She is now a longer course of aldara per recommendation by her physician in [place], Dr. Tapia who is a specialist in pagets disease of the vulva.     Had DEXA scan within the last 15 years that was normal. Does not want repeat as she would not do any treatment for osteoperosis. She does not do weight bearing exercises.    Seeing Dr. Alvarez for routine health maintenance.     Only complaint is some burning/discomfort at the upper portion of the vulva.     Past Medical History:   Diagnosis Date    Depression     Diabetes     metformin and diet controlled    HLD (hyperlipidemia)     Mild intermittent asthma     Paget disease, extra mammary     affecting vulva       Past Surgical History:   Procedure Laterality Date     SECTION         OB History    None         Current Outpatient Medications on File Prior to Visit   Medication Sig Dispense Refill    aspirin (ECOTRIN) 81 MG EC tablet Take 81 mg by mouth once daily.       BELVIQ 10 mg Tab       BEPREVE 1.5 % Drop       buPROPion (WELLBUTRIN XL) 150 MG TB24 tablet once daily.       ezetimibe (ZETIA) 10 mg tablet once daily.       fluconazole (DIFLUCAN) 150 MG Tab Take 1 tablet (150 mg total) by mouth every  "72 hours as needed. 2 tablet 0    FLUoxetine 20 MG capsule 20 mg once daily.       imiquimod (ALDARA) 5 % cream       INVOKANA 300 mg Tab tablet 300 mg once daily.       LATISSE 0.03 % ophthalmic solution       levothyroxine (SYNTHROID) 88 MCG tablet       liothyronine (CYTOMEL) 5 MCG Tab       metFORMIN (GLUCOPHAGE-XR) 500 MG 24 hr tablet 500 mg 4 (four) times daily as needed.       OZEMPIC 0.25 mg or 0.5 mg(2 mg/1.5 mL) PnIj       PAZEO 0.7 % Drop       pravastatin (PRAVACHOL) 40 MG tablet Take 1 tablet (40 mg total) by mouth once daily. 90 tablet 3    spironolactone (ALDACTONE) 100 MG tablet Take 50 mg by mouth once daily.       topiramate (TOPAMAX) 25 MG tablet       vitamin D (VITAMIN D3) 1000 units Tab Take 1,000 Units by mouth once daily.      YUVAFEM 10 mcg Tab once daily.       zolpidem (AMBIEN CR) 6.25 MG CR tablet 6.25 mg nightly as needed.        No current facility-administered medications on file prior to visit.        ROS:  GENERAL: Denies weight gain or weight loss. Feeling well overall.   SKIN: See HPI  HEAD: Denies head injury or headache.   CHEST: Denies chest pain or shortness of breath.   CARDIOVASCULAR: Denies palpitations or left sided chest pain.   ABDOMEN: No abdominal pain   REPRODUCTIVE: See HPI.   HEMATOLOGIC: No easy bruisability or excessive bleeding.     Physical Exam:   BP (!) 148/70   Ht 5' 8" (1.727 m)   Wt 80 kg (176 lb 5.9 oz)   BMI 26.82 kg/m²   General: No distress, well appearing  HEENT: normocephalic, atraumatic   Heart: Regular rate  Lungs: No increased work of breathing  MS: lower extremeties symmetrical, no edema  Pelvic Exam:     GENITALIA: There is diffuse erythema of the labia minora and majora with some overlying white patchy areas consistent with pagets disease of the vulva. Moderately improved from prior exam. Picture taken with patients permission for her to send to her gyn oncologist in [facility]. There is also thinning of the labia and narrowing of the " introitus.    Affirm collected and sent   URETHRA: normal appearing   VAGINA: normal vaginal mucosa, no lesions, atrophic appearing  PSYCH: Normal affect, mood appropriate       ASSESSMENT/PLAN: 75 yo here with extra mammary pagets disease of the vulva. Currently being managed by gyn oncology in [place]. Here for fu visit.     1. Offered referral to gyn oncology here at [facility]. She would like to continue vulvar care with her gyn oncologist in [place], Dr. Tapia. She is currently being managed with Aldara.   2. Affirm collected and sent. Has history of recurrent yeast infections. Currently - she is mostly asymptomatic other than some irritation at the clitoral ocampo - unclear if this is related to pagets, atrophy or candida. Currently using vagifem twice weekly. She may be interested in switching to estring and she will let us know.  3. Can try mycolog externally.  4. For recurrent candida, offered suppressive therapy vs PRN treatment. She prefers to use diflucan PRN symptoms.     Anne Brown MD  Obstetrics and Gynecology  [facility]

## 2019-09-23 NOTE — PATIENT INSTRUCTIONS
Oral fluconazole 150 mg weekly for 6 months is the first  regimen. If this regimen is not feasible, topical treatments used intermittently can also be considered. Suppressive maintenance therapies are effective in reducing RVVC. However, 30%-50% of women will have recurrent disease after maintenance therapy is discontinued. .

## 2019-09-24 ENCOUNTER — TELEPHONE (OUTPATIENT)
Dept: OBSTETRICS AND GYNECOLOGY | Facility: CLINIC | Age: 74
End: 2019-09-24

## 2019-09-24 RX ORDER — FLUCONAZOLE 150 MG/1
150 TABLET ORAL ONCE AS NEEDED
Qty: 10 TABLET | Refills: 1 | Status: SHIPPED | OUTPATIENT
Start: 2019-09-24 | End: 2019-09-24

## 2019-09-24 NOTE — TELEPHONE ENCOUNTER
Notes recorded by Milagros Do MA on 9/24/2019 at 10:44 AM CDT  Results given. She would like a handful of diflucan sent to [facility] pharmacy on file. She will just take as needed

## 2019-09-24 NOTE — TELEPHONE ENCOUNTER
----- Message from Anne Brown MD sent at 9/24/2019  8:04 AM CDT -----  Do you mind letting her know that swab was positive for yeast again. It is not uncommon to find yeast and be asymptomatic. So she doesn't necessarily need treatment if she isn't having symptoms which she wasn't yesterday. However, if she wants to have a handful of diflucan on hand, I am happy to send medication for her and she can take once per week as needed. She doesn't have to take that frequently. Thanks!  If she prefers to do suppressive treatment (ie prevent future infections), then we can do 3 doses every 72 hours to treat this current yeast followed by once weekly for 6 months.

## 2019-09-30 ENCOUNTER — LAB VISIT (OUTPATIENT)
Dept: LAB | Facility: OTHER | Age: 74
End: 2019-09-30
Attending: INTERNAL MEDICINE
Payer: MEDICARE

## 2019-09-30 DIAGNOSIS — I65.23 BILATERAL CAROTID ARTERY STENOSIS: ICD-10-CM

## 2019-09-30 DIAGNOSIS — E03.4 HYPOTHYROIDISM DUE TO ACQUIRED ATROPHY OF THYROID: ICD-10-CM

## 2019-09-30 DIAGNOSIS — E78.5 DYSLIPIDEMIA: ICD-10-CM

## 2019-09-30 DIAGNOSIS — E08.59 DIABETES MELLITUS DUE TO UNDERLYING CONDITION WITH OTHER CIRCULATORY COMPLICATION, UNSPECIFIED WHETHER LONG TERM INSULIN USE: ICD-10-CM

## 2019-09-30 DIAGNOSIS — Z78.9 STATIN INTOLERANCE: ICD-10-CM

## 2019-09-30 LAB
ALBUMIN SERPL BCP-MCNC: 4 G/DL (ref 3.5–5.2)
ALP SERPL-CCNC: 75 U/L (ref 55–135)
ALT SERPL W/O P-5'-P-CCNC: 25 U/L (ref 10–44)
ANION GAP SERPL CALC-SCNC: 8 MMOL/L (ref 8–16)
AST SERPL-CCNC: 17 U/L (ref 10–40)
BILIRUB SERPL-MCNC: 0.5 MG/DL (ref 0.1–1)
BUN SERPL-MCNC: 15 MG/DL (ref 8–23)
CALCIUM SERPL-MCNC: 9.7 MG/DL (ref 8.7–10.5)
CHLORIDE SERPL-SCNC: 105 MMOL/L (ref 95–110)
CHOLEST SERPL-MCNC: 117 MG/DL (ref 120–199)
CHOLEST/HDLC SERPL: 2.6 {RATIO} (ref 2–5)
CK SERPL-CCNC: 40 U/L (ref 20–180)
CO2 SERPL-SCNC: 26 MMOL/L (ref 23–29)
CREAT SERPL-MCNC: 0.8 MG/DL (ref 0.5–1.4)
EST. GFR  (AFRICAN AMERICAN): >60 ML/MIN/1.73 M^2
EST. GFR  (NON AFRICAN AMERICAN): >60 ML/MIN/1.73 M^2
ESTIMATED AVG GLUCOSE: 117 MG/DL (ref 68–131)
GLUCOSE SERPL-MCNC: 102 MG/DL (ref 70–110)
HBA1C MFR BLD HPLC: 5.7 % (ref 4–5.6)
HDLC SERPL-MCNC: 45 MG/DL (ref 40–75)
HDLC SERPL: 38.5 % (ref 20–50)
LDLC SERPL CALC-MCNC: 47.6 MG/DL (ref 63–159)
NONHDLC SERPL-MCNC: 72 MG/DL
POTASSIUM SERPL-SCNC: 4.3 MMOL/L (ref 3.5–5.1)
PROT SERPL-MCNC: 7.3 G/DL (ref 6–8.4)
SODIUM SERPL-SCNC: 139 MMOL/L (ref 136–145)
T3 SERPL-MCNC: 83 NG/DL (ref 60–180)
T3FREE SERPL-MCNC: 2.5 PG/ML (ref 2.3–4.2)
T4 FREE SERPL-MCNC: 0.93 NG/DL (ref 0.71–1.51)
TRIGL SERPL-MCNC: 122 MG/DL (ref 30–150)
TSH SERPL DL<=0.005 MIU/L-ACNC: 0.27 UIU/ML (ref 0.4–4)

## 2019-09-30 PROCEDURE — 84480 ASSAY TRIIODOTHYRONINE (T3): CPT

## 2019-09-30 PROCEDURE — 82550 ASSAY OF CK (CPK): CPT

## 2019-09-30 PROCEDURE — 83036 HEMOGLOBIN GLYCOSYLATED A1C: CPT

## 2019-09-30 PROCEDURE — 36415 COLL VENOUS BLD VENIPUNCTURE: CPT

## 2019-09-30 PROCEDURE — 84439 ASSAY OF FREE THYROXINE: CPT

## 2019-09-30 PROCEDURE — 84443 ASSAY THYROID STIM HORMONE: CPT

## 2019-09-30 PROCEDURE — 80053 COMPREHEN METABOLIC PANEL: CPT

## 2019-09-30 PROCEDURE — 80061 LIPID PANEL: CPT

## 2019-09-30 PROCEDURE — 84481 FREE ASSAY (FT-3): CPT

## 2019-10-01 ENCOUNTER — PATIENT MESSAGE (OUTPATIENT)
Dept: CARDIOLOGY | Facility: CLINIC | Age: 74
End: 2019-10-01

## 2019-10-01 ENCOUNTER — TELEPHONE (OUTPATIENT)
Dept: CARDIOLOGY | Facility: CLINIC | Age: 74
End: 2019-10-01

## 2019-10-01 NOTE — TELEPHONE ENCOUNTER
Results of most recent labs given to patient through [facility].    Notes recorded by Jeff Rao MD on 9/30/2019 at 9:11 PM CDT  Release-thyroid values some suggest thyroid too high and others perfect-I would rather have endocrinologist advise about this-not sure if she sees one outside [facility] if not we can arrange.

## 2019-10-02 ENCOUNTER — OFFICE VISIT (OUTPATIENT)
Dept: CARDIOLOGY | Facility: CLINIC | Age: 74
End: 2019-10-02
Payer: MEDICARE

## 2019-10-02 VITALS
BODY MASS INDEX: 27.26 KG/M2 | DIASTOLIC BLOOD PRESSURE: 64 MMHG | SYSTOLIC BLOOD PRESSURE: 122 MMHG | WEIGHT: 179.88 LBS | HEIGHT: 68 IN | HEART RATE: 84 BPM

## 2019-10-02 DIAGNOSIS — R73.03 PREDIABETES: ICD-10-CM

## 2019-10-02 DIAGNOSIS — R93.1 AGATSTON CAC SCORE, >400: Primary | ICD-10-CM

## 2019-10-02 DIAGNOSIS — E55.9 VITAMIN D DEFICIENCY: ICD-10-CM

## 2019-10-02 DIAGNOSIS — E78.5 DYSLIPIDEMIA: ICD-10-CM

## 2019-10-02 DIAGNOSIS — I65.23 BILATERAL CAROTID ARTERY STENOSIS: ICD-10-CM

## 2019-10-02 DIAGNOSIS — I10 ESSENTIAL HYPERTENSION: ICD-10-CM

## 2019-10-02 DIAGNOSIS — E78.2 MIXED HYPERLIPIDEMIA: ICD-10-CM

## 2019-10-02 DIAGNOSIS — C51.9 PAGET'S DISEASE OF VULVA: ICD-10-CM

## 2019-10-02 DIAGNOSIS — E03.4 HYPOTHYROIDISM DUE TO ACQUIRED ATROPHY OF THYROID: ICD-10-CM

## 2019-10-02 DIAGNOSIS — Z78.9 STATIN INTOLERANCE: ICD-10-CM

## 2019-10-02 DIAGNOSIS — E66.3 OVERWEIGHT (BMI 25.0-29.9): ICD-10-CM

## 2019-10-02 PROCEDURE — 99213 OFFICE O/P EST LOW 20 MIN: CPT | Mod: PBBFAC | Performed by: INTERNAL MEDICINE

## 2019-10-02 PROCEDURE — 99999 PR PBB SHADOW E&M-EST. PATIENT-LVL III: CPT | Mod: PBBFAC,,, | Performed by: INTERNAL MEDICINE

## 2019-10-02 PROCEDURE — 99214 OFFICE O/P EST MOD 30 MIN: CPT | Mod: S$PBB,,, | Performed by: INTERNAL MEDICINE

## 2019-10-02 PROCEDURE — 99999 PR PBB SHADOW E&M-EST. PATIENT-LVL III: ICD-10-PCS | Mod: PBBFAC,,, | Performed by: INTERNAL MEDICINE

## 2019-10-02 PROCEDURE — 99214 PR OFFICE/OUTPT VISIT, EST, LEVL IV, 30-39 MIN: ICD-10-PCS | Mod: S$PBB,,, | Performed by: INTERNAL MEDICINE

## 2019-10-02 RX ORDER — ACETAMINOPHEN AND PHENYLEPHRINE HCL 325; 5 MG/1; MG/1
TABLET ORAL DAILY
COMMUNITY

## 2019-10-02 NOTE — PATIENT INSTRUCTIONS
Discussed diet , achieving and maintaining ideal body weight, and exercise.   We reviewed meds in detail.  Reassured-Discussed goals, recommendations, plan.

## 2019-10-02 NOTE — PROGRESS NOTES
Subjective:   Patient ID:  Juan Antonio Mejia is a 74 y.o. female who presents for follow-up of Dyslipidemia      HPI:  The patient is here for CAD risk factors.  The patient has no chest pain, SOB, TIA, palpitations, syncope or pre-syncope.Patient does not exercise alot.        Review of Systems   Constitution: Negative for chills, decreased appetite, diaphoresis, fever, malaise/fatigue, night sweats, weight gain and weight loss.   HENT: Negative for congestion, hoarse voice, nosebleeds, sore throat and tinnitus.    Eyes: Negative for blurred vision, double vision, vision loss in left eye, vision loss in right eye, visual disturbance and visual halos.   Cardiovascular: Negative for chest pain, claudication, cyanosis, dyspnea on exertion, irregular heartbeat, leg swelling, near-syncope, orthopnea, palpitations, paroxysmal nocturnal dyspnea and syncope.   Respiratory: Negative for cough, hemoptysis, shortness of breath, sleep disturbances due to breathing, snoring, sputum production and wheezing.    Endocrine: Negative for cold intolerance, heat intolerance, polydipsia, polyphagia and polyuria.   Hematologic/Lymphatic: Negative for adenopathy and bleeding problem. Does not bruise/bleed easily.   Skin: Negative for color change, dry skin, flushing, itching, nail changes, poor wound healing, rash, skin cancer, suspicious lesions and unusual hair distribution.   Musculoskeletal: Negative for arthritis, back pain, falls, gout, joint pain, joint swelling, muscle cramps, muscle weakness, myalgias and stiffness.   Gastrointestinal: Negative for abdominal pain, anorexia, change in bowel habit, constipation, diarrhea, dysphagia, heartburn, hematemesis, hematochezia, melena and vomiting.   Genitourinary: Negative for decreased libido, dysuria, hematuria, hesitancy and urgency.   Neurological: Negative for excessive daytime sleepiness, dizziness, focal weakness, headaches, light-headedness, loss of balance, numbness, paresthesias,  "seizures, sensory change, tremors, vertigo and weakness.   Psychiatric/Behavioral: Negative for altered mental status, depression, hallucinations, memory loss, substance abuse and suicidal ideas. The patient does not have insomnia and is not nervous/anxious.    Allergic/Immunologic: Negative for environmental allergies and hives.       Objective: /64 (BP Location: Left arm, Patient Position: Sitting, BP Method: Large (Automatic))   Pulse 84   Ht 5' 8" (1.727 m)   Wt 81.6 kg (179 lb 14.3 oz)   BMI 27.35 kg/m²      Physical Exam   Constitutional: She is oriented to person, place, and time. She appears well-developed and well-nourished.   HENT:   Head: Normocephalic.   Eyes: Pupils are equal, round, and reactive to light. EOM are normal.   Neck: Normal range of motion. Normal carotid pulses, no hepatojugular reflux and no JVD present. Carotid bruit is not present. No thyromegaly present.   Cardiovascular: Normal rate, regular rhythm, normal heart sounds and intact distal pulses. Exam reveals no gallop and no friction rub.   No murmur heard.  Pulmonary/Chest: Effort normal and breath sounds normal. No tachypnea. No respiratory distress. She has no wheezes. She has no rales. She exhibits no tenderness.   Abdominal: Soft. Bowel sounds are normal. She exhibits no distension and no mass. There is no tenderness. There is no rebound and no guarding.   Musculoskeletal: Normal range of motion. She exhibits no edema or tenderness.   Lymphadenopathy:     She has no cervical adenopathy.   Neurological: She is alert and oriented to person, place, and time. No cranial nerve deficit. Coordination normal.   Skin: Skin is warm. No rash noted. No erythema.   Psychiatric: She has a normal mood and affect. Her behavior is normal. Judgment and thought content normal.       Assessment:     1. Agatston CAC score, >400    2. Bilateral carotid artery stenosis    3. Dyslipidemia    4. Essential hypertension    5. Mixed hyperlipidemia  "   6. Hypothyroidism due to acquired atrophy of thyroid    7. Overweight (BMI 25.0-29.9)    8. Prediabetes    9. Statin intolerance    10. Vitamin D deficiency    11. Paget's disease of vulva        Plan:   Discussed diet , achieving and maintaining ideal body weight, and exercise.   We reviewed meds in detail.  Reassured-Discussed goals, recommendations, plan.    Juan Antonio was seen today for dyslipidemia.    Diagnoses and all orders for this visit:    Agatston CAC score, >400  -     Vitamin D; Future; Expected date: 10/02/2020    Bilateral carotid artery stenosis    Dyslipidemia  -     Vitamin D; Future; Expected date: 10/02/2020    Essential hypertension  -     Vitamin D; Future; Expected date: 10/02/2020    Mixed hyperlipidemia  -     Vitamin D; Future; Expected date: 10/02/2020    Hypothyroidism due to acquired atrophy of thyroid    Overweight (BMI 25.0-29.9)    Prediabetes    Statin intolerance    Vitamin D deficiency  -     Vitamin D; Future; Expected date: 10/02/2020    Paget's disease of vulva    Other orders  -     MAGNESIUM ORAL; Take 100 mg by mouth once daily.  -     glucosamine HCl (GLUCOSAMINE, BULK, MISC); 3,000 mg by Misc.(Non-Drug; Combo Route) route once daily.  -     biotin 10,000 mcg Cap; Take by mouth once daily.            Follow up if symptoms worsen or fail to improve, for as before but add D to next blood work.

## 2019-10-03 ENCOUNTER — PATIENT MESSAGE (OUTPATIENT)
Dept: CARDIOLOGY | Facility: CLINIC | Age: 74
End: 2019-10-03

## 2019-10-16 ENCOUNTER — TELEPHONE (OUTPATIENT)
Dept: VASCULAR SURGERY | Facility: CLINIC | Age: 74
End: 2019-10-16

## 2019-10-16 DIAGNOSIS — I65.23 BILATERAL CAROTID ARTERY STENOSIS: Primary | ICD-10-CM

## 2019-10-16 NOTE — TELEPHONE ENCOUNTER
----- Message from Marika Whitley sent at 10/16/2019 11:59 AM CDT -----  Contact: Patient  Scheduling request    Scheduling appt :: 6mo f/u    Treating provider:: Dr Williamson    Do you feel you need to be seen today:: No    Contact:: [phone]    Additional info::

## 2019-10-21 ENCOUNTER — TELEPHONE (OUTPATIENT)
Dept: VASCULAR SURGERY | Facility: CLINIC | Age: 74
End: 2019-10-21

## 2019-10-21 NOTE — TELEPHONE ENCOUNTER
Contacted patient in response to message. Assisted pt to reschedule appts to a time that is more convenient for her. Pt verified. States she does not want an appt letter sent to her home.   ----- Message from Gina Ellison sent at 10/21/2019 12:49 PM CDT -----  Contact: pt   Please give pt a call back at [phone] to reschedule her appts with Dr. Williamson. Pt states that she has been leaving messages to reschedule but no one has been calling her back to reschedule with her but instead scheduling appts for her that she can not make. Please give pt a call before rescheduling appts .

## 2019-10-22 ENCOUNTER — PATIENT MESSAGE (OUTPATIENT)
Dept: INTERNAL MEDICINE | Facility: CLINIC | Age: 74
End: 2019-10-22

## 2019-10-23 DIAGNOSIS — J45.20 MILD INTERMITTENT ASTHMA WITHOUT COMPLICATION: Primary | ICD-10-CM

## 2019-10-23 RX ORDER — ALBUTEROL SULFATE 90 UG/1
1 AEROSOL, METERED RESPIRATORY (INHALATION) EVERY 6 HOURS PRN
Qty: 16 G | Refills: 5 | Status: SHIPPED | OUTPATIENT
Start: 2019-10-23 | End: 2020-04-06 | Stop reason: SDUPTHER

## 2019-10-24 ENCOUNTER — PATIENT MESSAGE (OUTPATIENT)
Dept: CARDIOLOGY | Facility: CLINIC | Age: 74
End: 2019-10-24

## 2019-10-31 ENCOUNTER — PATIENT MESSAGE (OUTPATIENT)
Dept: INTERNAL MEDICINE | Facility: CLINIC | Age: 74
End: 2019-10-31

## 2019-10-31 DIAGNOSIS — E66.3 OVERWEIGHT (BMI 25.0-29.9): ICD-10-CM

## 2019-10-31 DIAGNOSIS — R73.03 PREDIABETES: Primary | ICD-10-CM

## 2019-10-31 DIAGNOSIS — E03.4 HYPOTHYROIDISM DUE TO ACQUIRED ATROPHY OF THYROID: ICD-10-CM

## 2019-11-26 ENCOUNTER — PATIENT MESSAGE (OUTPATIENT)
Dept: INTERNAL MEDICINE | Facility: CLINIC | Age: 74
End: 2019-11-26

## 2019-11-26 DIAGNOSIS — E03.4 HYPOTHYROIDISM DUE TO ACQUIRED ATROPHY OF THYROID: ICD-10-CM

## 2019-11-26 DIAGNOSIS — E66.3 OVERWEIGHT (BMI 25.0-29.9): ICD-10-CM

## 2019-11-26 DIAGNOSIS — R73.03 PREDIABETES: Primary | ICD-10-CM

## 2019-12-01 ENCOUNTER — ANESTHESIA EVENT (OUTPATIENT)
Dept: SURGERY | Facility: HOSPITAL | Age: 74
End: 2019-12-01
Payer: MEDICARE

## 2019-12-01 ENCOUNTER — HOSPITAL ENCOUNTER (OUTPATIENT)
Facility: HOSPITAL | Age: 74
Discharge: HOME | End: 2019-12-02
Attending: EMERGENCY MEDICINE | Admitting: SURGERY
Payer: MEDICARE

## 2019-12-01 ENCOUNTER — ANESTHESIA (OUTPATIENT)
Dept: SURGERY | Facility: HOSPITAL | Age: 74
End: 2019-12-01
Payer: MEDICARE

## 2019-12-01 DIAGNOSIS — R10.9 ABDOMINAL PAIN: ICD-10-CM

## 2019-12-01 DIAGNOSIS — K35.80 ACUTE APPENDICITIS: Primary | ICD-10-CM

## 2019-12-01 LAB
ALBUMIN SERPL BCP-MCNC: 4.1 G/DL (ref 3.5–5.2)
ALP SERPL-CCNC: 87 U/L (ref 55–135)
ALT SERPL W/O P-5'-P-CCNC: 18 U/L (ref 10–44)
ANION GAP SERPL CALC-SCNC: 11 MMOL/L (ref 8–16)
AST SERPL-CCNC: 13 U/L (ref 10–40)
BACTERIA #/AREA URNS AUTO: ABNORMAL /HPF
BASOPHILS # BLD AUTO: 0.06 K/UL (ref 0–0.2)
BASOPHILS NFR BLD: 0.3 % (ref 0–1.9)
BILIRUB SERPL-MCNC: 0.8 MG/DL (ref 0.1–1)
BILIRUB UR QL STRIP: NEGATIVE
BUN SERPL-MCNC: 11 MG/DL (ref 8–23)
CALCIUM SERPL-MCNC: 9.6 MG/DL (ref 8.7–10.5)
CHLORIDE SERPL-SCNC: 102 MMOL/L (ref 95–110)
CLARITY UR REFRACT.AUTO: ABNORMAL
CO2 SERPL-SCNC: 23 MMOL/L (ref 23–29)
COLOR UR AUTO: YELLOW
CREAT SERPL-MCNC: 0.8 MG/DL (ref 0.5–1.4)
DIFFERENTIAL METHOD: ABNORMAL
EOSINOPHIL # BLD AUTO: 0 K/UL (ref 0–0.5)
EOSINOPHIL NFR BLD: 0.1 % (ref 0–8)
ERYTHROCYTE [DISTWIDTH] IN BLOOD BY AUTOMATED COUNT: 14.2 % (ref 11.5–14.5)
EST. GFR  (AFRICAN AMERICAN): >60 ML/MIN/1.73 M^2
EST. GFR  (NON AFRICAN AMERICAN): >60 ML/MIN/1.73 M^2
GLUCOSE SERPL-MCNC: 116 MG/DL (ref 70–110)
GLUCOSE UR QL STRIP: ABNORMAL
HCT VFR BLD AUTO: 48.5 % (ref 37–48.5)
HGB BLD-MCNC: 15.7 G/DL (ref 12–16)
HGB UR QL STRIP: ABNORMAL
HYALINE CASTS UR QL AUTO: 0 /LPF
IMM GRANULOCYTES # BLD AUTO: 0.17 K/UL (ref 0–0.04)
IMM GRANULOCYTES NFR BLD AUTO: 0.8 % (ref 0–0.5)
KETONES UR QL STRIP: ABNORMAL
LACTATE SERPL-SCNC: 0.9 MMOL/L (ref 0.5–2.2)
LEUKOCYTE ESTERASE UR QL STRIP: ABNORMAL
LIPASE SERPL-CCNC: 19 U/L (ref 4–60)
LYMPHOCYTES # BLD AUTO: 1.6 K/UL (ref 1–4.8)
LYMPHOCYTES NFR BLD: 7.7 % (ref 18–48)
MCH RBC QN AUTO: 27.4 PG (ref 27–31)
MCHC RBC AUTO-ENTMCNC: 32.4 G/DL (ref 32–36)
MCV RBC AUTO: 85 FL (ref 82–98)
MICROSCOPIC COMMENT: ABNORMAL
MONOCYTES # BLD AUTO: 1.5 K/UL (ref 0.3–1)
MONOCYTES NFR BLD: 7.3 % (ref 4–15)
NEUTROPHILS # BLD AUTO: 17.2 K/UL (ref 1.8–7.7)
NEUTROPHILS NFR BLD: 83.8 % (ref 38–73)
NITRITE UR QL STRIP: NEGATIVE
NON-SQ EPI CELLS #/AREA URNS AUTO: 2 /HPF
NRBC BLD-RTO: 0 /100 WBC
PH UR STRIP: 5 [PH] (ref 5–8)
PLATELET # BLD AUTO: 299 K/UL (ref 150–350)
PMV BLD AUTO: 10.4 FL (ref 9.2–12.9)
POCT GLUCOSE: 98 MG/DL (ref 70–110)
POTASSIUM SERPL-SCNC: 4 MMOL/L (ref 3.5–5.1)
PROT SERPL-MCNC: 7.9 G/DL (ref 6–8.4)
PROT UR QL STRIP: ABNORMAL
RBC # BLD AUTO: 5.72 M/UL (ref 4–5.4)
RBC #/AREA URNS AUTO: 9 /HPF (ref 0–4)
SODIUM SERPL-SCNC: 136 MMOL/L (ref 136–145)
SP GR UR STRIP: >=1.03 (ref 1–1.03)
SQUAMOUS #/AREA URNS AUTO: 9 /HPF
URN SPEC COLLECT METH UR: ABNORMAL
WBC # BLD AUTO: 20.49 K/UL (ref 3.9–12.7)
WBC #/AREA URNS AUTO: 74 /HPF (ref 0–5)
YEAST UR QL AUTO: ABNORMAL

## 2019-12-01 PROCEDURE — 27201423 OPTIME MED/SURG SUP & DEVICES STERILE SUPPLY: Performed by: SURGERY

## 2019-12-01 PROCEDURE — 63600175 PHARM REV CODE 636 W HCPCS: Performed by: NURSE ANESTHETIST, CERTIFIED REGISTERED

## 2019-12-01 PROCEDURE — 80053 COMPREHEN METABOLIC PANEL: CPT

## 2019-12-01 PROCEDURE — 44970 LAPAROSCOPY APPENDECTOMY: CPT | Mod: GC,,, | Performed by: SURGERY

## 2019-12-01 PROCEDURE — S0020 INJECTION, BUPIVICAINE HYDRO: HCPCS | Performed by: SURGERY

## 2019-12-01 PROCEDURE — 87088 URINE BACTERIA CULTURE: CPT

## 2019-12-01 PROCEDURE — 96375 TX/PRO/DX INJ NEW DRUG ADDON: CPT | Mod: 59 | Performed by: EMERGENCY MEDICINE

## 2019-12-01 PROCEDURE — 37000008 HC ANESTHESIA 1ST 15 MINUTES: Performed by: SURGERY

## 2019-12-01 PROCEDURE — 36000708 HC OR TIME LEV III 1ST 15 MIN: Performed by: SURGERY

## 2019-12-01 PROCEDURE — 44970 PR LAP,APPENDECTOMY: ICD-10-PCS | Mod: GC,,, | Performed by: SURGERY

## 2019-12-01 PROCEDURE — 83690 ASSAY OF LIPASE: CPT

## 2019-12-01 PROCEDURE — D9220A PRA ANESTHESIA: Mod: ANES,,, | Performed by: ANESTHESIOLOGY

## 2019-12-01 PROCEDURE — G0378 HOSPITAL OBSERVATION PER HR: HCPCS

## 2019-12-01 PROCEDURE — D9220A PRA ANESTHESIA: Mod: CRNA,,, | Performed by: NURSE ANESTHETIST, CERTIFIED REGISTERED

## 2019-12-01 PROCEDURE — 25000003 PHARM REV CODE 250: Performed by: NURSE ANESTHETIST, CERTIFIED REGISTERED

## 2019-12-01 PROCEDURE — 81001 URINALYSIS AUTO W/SCOPE: CPT

## 2019-12-01 PROCEDURE — 25000003 PHARM REV CODE 250: Performed by: STUDENT IN AN ORGANIZED HEALTH CARE EDUCATION/TRAINING PROGRAM

## 2019-12-01 PROCEDURE — 99285 EMERGENCY DEPT VISIT HI MDM: CPT | Mod: 25

## 2019-12-01 PROCEDURE — 88304 TISSUE EXAM BY PATHOLOGIST: CPT | Mod: 26,,, | Performed by: PATHOLOGY

## 2019-12-01 PROCEDURE — 96374 THER/PROPH/DIAG INJ IV PUSH: CPT

## 2019-12-01 PROCEDURE — D9220A PRA ANESTHESIA: ICD-10-PCS | Mod: CRNA,,, | Performed by: NURSE ANESTHETIST, CERTIFIED REGISTERED

## 2019-12-01 PROCEDURE — 37000009 HC ANESTHESIA EA ADD 15 MINS: Performed by: SURGERY

## 2019-12-01 PROCEDURE — 82962 GLUCOSE BLOOD TEST: CPT | Performed by: SURGERY

## 2019-12-01 PROCEDURE — 99285 PR EMERGENCY DEPT VISIT,LEVEL V: ICD-10-PCS | Mod: ,,, | Performed by: PHYSICIAN ASSISTANT

## 2019-12-01 PROCEDURE — 25000003 PHARM REV CODE 250: Performed by: SURGERY

## 2019-12-01 PROCEDURE — 93005 ELECTROCARDIOGRAM TRACING: CPT

## 2019-12-01 PROCEDURE — 85025 COMPLETE CBC W/AUTO DIFF WBC: CPT

## 2019-12-01 PROCEDURE — 25500020 PHARM REV CODE 255: Performed by: EMERGENCY MEDICINE

## 2019-12-01 PROCEDURE — D9220A PRA ANESTHESIA: ICD-10-PCS | Mod: ANES,,, | Performed by: ANESTHESIOLOGY

## 2019-12-01 PROCEDURE — 96375 TX/PRO/DX INJ NEW DRUG ADDON: CPT | Mod: 59

## 2019-12-01 PROCEDURE — 36000709 HC OR TIME LEV III EA ADD 15 MIN: Performed by: SURGERY

## 2019-12-01 PROCEDURE — 63600175 PHARM REV CODE 636 W HCPCS: Performed by: PHYSICIAN ASSISTANT

## 2019-12-01 PROCEDURE — 88304 PR  SURG PATH,LEVEL III: ICD-10-PCS | Mod: 26,,, | Performed by: PATHOLOGY

## 2019-12-01 PROCEDURE — 93010 ELECTROCARDIOGRAM REPORT: CPT | Mod: ,,, | Performed by: INTERNAL MEDICINE

## 2019-12-01 PROCEDURE — 88304 TISSUE EXAM BY PATHOLOGIST: CPT | Performed by: PATHOLOGY

## 2019-12-01 PROCEDURE — 96361 HYDRATE IV INFUSION ADD-ON: CPT

## 2019-12-01 PROCEDURE — 99285 EMERGENCY DEPT VISIT HI MDM: CPT | Mod: ,,, | Performed by: PHYSICIAN ASSISTANT

## 2019-12-01 PROCEDURE — 83605 ASSAY OF LACTIC ACID: CPT

## 2019-12-01 PROCEDURE — 87086 URINE CULTURE/COLONY COUNT: CPT

## 2019-12-01 PROCEDURE — 63600175 PHARM REV CODE 636 W HCPCS: Performed by: ANESTHESIOLOGY

## 2019-12-01 PROCEDURE — 93010 EKG 12-LEAD: ICD-10-PCS | Mod: ,,, | Performed by: INTERNAL MEDICINE

## 2019-12-01 PROCEDURE — 71000033 HC RECOVERY, INTIAL HOUR: Performed by: SURGERY

## 2019-12-01 RX ORDER — LIDOCAINE HCL/PF 100 MG/5ML
SYRINGE (ML) INTRAVENOUS
Status: DISCONTINUED | OUTPATIENT
Start: 2019-12-01 | End: 2019-12-01

## 2019-12-01 RX ORDER — ACETAMINOPHEN 325 MG/1
650 TABLET ORAL EVERY 8 HOURS PRN
Status: DISCONTINUED | OUTPATIENT
Start: 2019-12-01 | End: 2019-12-02 | Stop reason: HOSPADM

## 2019-12-01 RX ORDER — ASPIRIN 81 MG/1
81 TABLET ORAL DAILY
Status: DISCONTINUED | OUTPATIENT
Start: 2019-12-02 | End: 2019-12-02 | Stop reason: HOSPADM

## 2019-12-01 RX ORDER — TOPIRAMATE 25 MG/1
25 TABLET ORAL DAILY
Status: DISCONTINUED | OUTPATIENT
Start: 2019-12-02 | End: 2019-12-02 | Stop reason: HOSPADM

## 2019-12-01 RX ORDER — GLYCOPYRROLATE 0.2 MG/ML
INJECTION INTRAMUSCULAR; INTRAVENOUS
Status: DISCONTINUED | OUTPATIENT
Start: 2019-12-01 | End: 2019-12-01

## 2019-12-01 RX ORDER — ONDANSETRON 2 MG/ML
INJECTION INTRAMUSCULAR; INTRAVENOUS
Status: DISCONTINUED | OUTPATIENT
Start: 2019-12-01 | End: 2019-12-01

## 2019-12-01 RX ORDER — INSULIN ASPART 100 [IU]/ML
1-10 INJECTION, SOLUTION INTRAVENOUS; SUBCUTANEOUS
Status: DISCONTINUED | OUTPATIENT
Start: 2019-12-01 | End: 2019-12-02 | Stop reason: HOSPADM

## 2019-12-01 RX ORDER — METOCLOPRAMIDE HYDROCHLORIDE 5 MG/ML
10 INJECTION INTRAMUSCULAR; INTRAVENOUS EVERY 10 MIN PRN
Status: DISCONTINUED | OUTPATIENT
Start: 2019-12-01 | End: 2019-12-01 | Stop reason: HOSPADM

## 2019-12-01 RX ORDER — LEVOTHYROXINE SODIUM 88 UG/1
88 TABLET ORAL
Status: DISCONTINUED | OUTPATIENT
Start: 2019-12-02 | End: 2019-12-02 | Stop reason: HOSPADM

## 2019-12-01 RX ORDER — BUPIVACAINE HYDROCHLORIDE 5 MG/ML
INJECTION, SOLUTION EPIDURAL; INTRACAUDAL
Status: DISCONTINUED | OUTPATIENT
Start: 2019-12-01 | End: 2019-12-01 | Stop reason: HOSPADM

## 2019-12-01 RX ORDER — LORAZEPAM 2 MG/ML
0.25 INJECTION INTRAMUSCULAR ONCE AS NEEDED
Status: DISCONTINUED | OUTPATIENT
Start: 2019-12-01 | End: 2019-12-01 | Stop reason: HOSPADM

## 2019-12-01 RX ORDER — BUPROPION HYDROCHLORIDE 150 MG/1
150 TABLET ORAL DAILY
Status: DISCONTINUED | OUTPATIENT
Start: 2019-12-02 | End: 2019-12-02 | Stop reason: HOSPADM

## 2019-12-01 RX ORDER — GLUCAGON 1 MG
1 KIT INJECTION
Status: DISCONTINUED | OUTPATIENT
Start: 2019-12-01 | End: 2019-12-02 | Stop reason: HOSPADM

## 2019-12-01 RX ORDER — NEOSTIGMINE METHYLSULFATE 0.5 MG/ML
INJECTION, SOLUTION INTRAVENOUS
Status: DISCONTINUED | OUTPATIENT
Start: 2019-12-01 | End: 2019-12-01

## 2019-12-01 RX ORDER — LIOTHYRONINE SODIUM 5 UG/1
5 TABLET ORAL DAILY
Status: DISCONTINUED | OUTPATIENT
Start: 2019-12-02 | End: 2019-12-02 | Stop reason: HOSPADM

## 2019-12-01 RX ORDER — SODIUM CHLORIDE 0.9 % (FLUSH) 0.9 %
10 SYRINGE (ML) INJECTION
Status: DISCONTINUED | OUTPATIENT
Start: 2019-12-01 | End: 2019-12-02 | Stop reason: HOSPADM

## 2019-12-01 RX ORDER — SUCCINYLCHOLINE CHLORIDE 20 MG/ML
INJECTION INTRAMUSCULAR; INTRAVENOUS
Status: DISCONTINUED | OUTPATIENT
Start: 2019-12-01 | End: 2019-12-01

## 2019-12-01 RX ORDER — SODIUM CHLORIDE 0.9 % (FLUSH) 0.9 %
3 SYRINGE (ML) INJECTION
Status: DISCONTINUED | OUTPATIENT
Start: 2019-12-01 | End: 2019-12-01 | Stop reason: HOSPADM

## 2019-12-01 RX ORDER — ENOXAPARIN SODIUM 100 MG/ML
40 INJECTION SUBCUTANEOUS EVERY 24 HOURS
Status: DISCONTINUED | OUTPATIENT
Start: 2019-12-01 | End: 2019-12-02 | Stop reason: HOSPADM

## 2019-12-01 RX ORDER — ONDANSETRON 2 MG/ML
8 INJECTION INTRAMUSCULAR; INTRAVENOUS EVERY 12 HOURS PRN
Status: DISCONTINUED | OUTPATIENT
Start: 2019-12-01 | End: 2019-12-02 | Stop reason: HOSPADM

## 2019-12-01 RX ORDER — HYDROCODONE BITARTRATE AND ACETAMINOPHEN 5; 325 MG/1; MG/1
1 TABLET ORAL EVERY 4 HOURS PRN
Status: DISCONTINUED | OUTPATIENT
Start: 2019-12-01 | End: 2019-12-02 | Stop reason: HOSPADM

## 2019-12-01 RX ORDER — MORPHINE SULFATE 4 MG/ML
4 INJECTION, SOLUTION INTRAMUSCULAR; INTRAVENOUS
Status: COMPLETED | OUTPATIENT
Start: 2019-12-01 | End: 2019-12-01

## 2019-12-01 RX ORDER — SODIUM CHLORIDE 9 MG/ML
INJECTION, SOLUTION INTRAVENOUS CONTINUOUS
Status: DISCONTINUED | OUTPATIENT
Start: 2019-12-01 | End: 2019-12-02

## 2019-12-01 RX ORDER — FENTANYL CITRATE 50 UG/ML
INJECTION, SOLUTION INTRAMUSCULAR; INTRAVENOUS
Status: DISCONTINUED | OUTPATIENT
Start: 2019-12-01 | End: 2019-12-01

## 2019-12-01 RX ORDER — PHENYLEPHRINE HYDROCHLORIDE 10 MG/ML
INJECTION INTRAVENOUS
Status: DISCONTINUED | OUTPATIENT
Start: 2019-12-01 | End: 2019-12-01

## 2019-12-01 RX ORDER — SPIRONOLACTONE 50 MG/1
50 TABLET, FILM COATED ORAL DAILY
Status: DISCONTINUED | OUTPATIENT
Start: 2019-12-02 | End: 2019-12-02 | Stop reason: HOSPADM

## 2019-12-01 RX ORDER — IBUPROFEN 200 MG
24 TABLET ORAL
Status: DISCONTINUED | OUTPATIENT
Start: 2019-12-01 | End: 2019-12-02 | Stop reason: HOSPADM

## 2019-12-01 RX ORDER — IBUPROFEN 200 MG
16 TABLET ORAL
Status: DISCONTINUED | OUTPATIENT
Start: 2019-12-01 | End: 2019-12-02 | Stop reason: HOSPADM

## 2019-12-01 RX ORDER — METOCLOPRAMIDE HYDROCHLORIDE 5 MG/ML
10 INJECTION INTRAMUSCULAR; INTRAVENOUS
Status: COMPLETED | OUTPATIENT
Start: 2019-12-01 | End: 2019-12-01

## 2019-12-01 RX ORDER — FLUOXETINE HYDROCHLORIDE 20 MG/1
20 CAPSULE ORAL DAILY
Status: DISCONTINUED | OUTPATIENT
Start: 2019-12-02 | End: 2019-12-02 | Stop reason: HOSPADM

## 2019-12-01 RX ORDER — HYDROMORPHONE HYDROCHLORIDE 1 MG/ML
0.2 INJECTION, SOLUTION INTRAMUSCULAR; INTRAVENOUS; SUBCUTANEOUS EVERY 5 MIN PRN
Status: DISCONTINUED | OUTPATIENT
Start: 2019-12-01 | End: 2019-12-01 | Stop reason: HOSPADM

## 2019-12-01 RX ORDER — HYDROCODONE BITARTRATE AND ACETAMINOPHEN 10; 325 MG/1; MG/1
1 TABLET ORAL EVERY 4 HOURS PRN
Status: DISCONTINUED | OUTPATIENT
Start: 2019-12-01 | End: 2019-12-02 | Stop reason: HOSPADM

## 2019-12-01 RX ORDER — ALBUTEROL SULFATE 90 UG/1
1 AEROSOL, METERED RESPIRATORY (INHALATION) EVERY 6 HOURS PRN
Status: DISCONTINUED | OUTPATIENT
Start: 2019-12-01 | End: 2019-12-02 | Stop reason: HOSPADM

## 2019-12-01 RX ORDER — ONDANSETRON 2 MG/ML
8 INJECTION INTRAMUSCULAR; INTRAVENOUS
Status: COMPLETED | OUTPATIENT
Start: 2019-12-01 | End: 2019-12-01

## 2019-12-01 RX ORDER — PROPOFOL 10 MG/ML
VIAL (ML) INTRAVENOUS
Status: DISCONTINUED | OUTPATIENT
Start: 2019-12-01 | End: 2019-12-01

## 2019-12-01 RX ORDER — MIDAZOLAM HYDROCHLORIDE 1 MG/ML
INJECTION, SOLUTION INTRAMUSCULAR; INTRAVENOUS
Status: DISCONTINUED | OUTPATIENT
Start: 2019-12-01 | End: 2019-12-01

## 2019-12-01 RX ORDER — ROCURONIUM BROMIDE 10 MG/ML
INJECTION, SOLUTION INTRAVENOUS
Status: DISCONTINUED | OUTPATIENT
Start: 2019-12-01 | End: 2019-12-01

## 2019-12-01 RX ADMIN — HYDROCODONE BITARTRATE AND ACETAMINOPHEN 1 TABLET: 5; 325 TABLET ORAL at 02:12

## 2019-12-01 RX ADMIN — FENTANYL CITRATE 25 MCG: 50 INJECTION, SOLUTION INTRAMUSCULAR; INTRAVENOUS at 03:12

## 2019-12-01 RX ADMIN — HYDROCODONE BITARTRATE AND ACETAMINOPHEN 1 TABLET: 10; 325 TABLET ORAL at 09:12

## 2019-12-01 RX ADMIN — PHENYLEPHRINE HYDROCHLORIDE 100 MCG: 10 INJECTION INTRAVENOUS at 03:12

## 2019-12-01 RX ADMIN — SODIUM CHLORIDE, SODIUM GLUCONATE, SODIUM ACETATE, POTASSIUM CHLORIDE, MAGNESIUM CHLORIDE, SODIUM PHOSPHATE, DIBASIC, AND POTASSIUM PHOSPHATE: .53; .5; .37; .037; .03; .012; .00082 INJECTION, SOLUTION INTRAVENOUS at 04:12

## 2019-12-01 RX ADMIN — ONDANSETRON 8 MG: 2 INJECTION INTRAMUSCULAR; INTRAVENOUS at 10:12

## 2019-12-01 RX ADMIN — ROCURONIUM BROMIDE 5 MG: 10 INJECTION, SOLUTION INTRAVENOUS at 03:12

## 2019-12-01 RX ADMIN — FENTANYL CITRATE 100 MCG: 50 INJECTION, SOLUTION INTRAMUSCULAR; INTRAVENOUS at 03:12

## 2019-12-01 RX ADMIN — MIDAZOLAM HYDROCHLORIDE 2 MG: 1 INJECTION, SOLUTION INTRAMUSCULAR; INTRAVENOUS at 03:12

## 2019-12-01 RX ADMIN — PROPOFOL 100 MG: 10 INJECTION, EMULSION INTRAVENOUS at 03:12

## 2019-12-01 RX ADMIN — LIDOCAINE HYDROCHLORIDE 20 MG: 20 INJECTION, SOLUTION INTRAVENOUS at 04:12

## 2019-12-01 RX ADMIN — SUCCINYLCHOLINE CHLORIDE 100 MG: 20 INJECTION, SOLUTION INTRAMUSCULAR; INTRAVENOUS at 03:12

## 2019-12-01 RX ADMIN — NEOSTIGMINE METHYLSULFATE 5 MG: 0.5 INJECTION INTRAVENOUS at 04:12

## 2019-12-01 RX ADMIN — IOHEXOL 100 ML: 350 INJECTION, SOLUTION INTRAVENOUS at 11:12

## 2019-12-01 RX ADMIN — ROCURONIUM BROMIDE 30 MG: 10 INJECTION, SOLUTION INTRAVENOUS at 03:12

## 2019-12-01 RX ADMIN — HYDROMORPHONE HYDROCHLORIDE 0.2 MG: 1 INJECTION, SOLUTION INTRAMUSCULAR; INTRAVENOUS; SUBCUTANEOUS at 05:12

## 2019-12-01 RX ADMIN — HYDROCODONE BITARTRATE AND ACETAMINOPHEN 1 TABLET: 5; 325 TABLET ORAL at 05:12

## 2019-12-01 RX ADMIN — SODIUM CHLORIDE, SODIUM GLUCONATE, SODIUM ACETATE, POTASSIUM CHLORIDE, MAGNESIUM CHLORIDE, SODIUM PHOSPHATE, DIBASIC, AND POTASSIUM PHOSPHATE: .53; .5; .37; .037; .03; .012; .00082 INJECTION, SOLUTION INTRAVENOUS at 03:12

## 2019-12-01 RX ADMIN — LIDOCAINE HYDROCHLORIDE 80 MG: 20 INJECTION, SOLUTION INTRAVENOUS at 03:12

## 2019-12-01 RX ADMIN — ONDANSETRON 4 MG: 2 INJECTION INTRAMUSCULAR; INTRAVENOUS at 04:12

## 2019-12-01 RX ADMIN — METOCLOPRAMIDE 10 MG: 5 INJECTION, SOLUTION INTRAMUSCULAR; INTRAVENOUS at 11:12

## 2019-12-01 RX ADMIN — SODIUM CHLORIDE 1000 ML: 0.9 INJECTION, SOLUTION INTRAVENOUS at 10:12

## 2019-12-01 RX ADMIN — MORPHINE SULFATE 4 MG: 4 INJECTION INTRAVENOUS at 11:12

## 2019-12-01 RX ADMIN — GLYCOPYRROLATE 0.6 MG: 0.2 INJECTION, SOLUTION INTRAMUSCULAR; INTRAVENOUS at 04:12

## 2019-12-01 NOTE — OP NOTE
DATE OF PROCEDURE: 12/01/2019     PREOPERATIVE DIAGNOSIS: Acute appendicitis.     POSTOPERATIVE DIAGNOSIS: Acute appendicitis.     PROCEDURE PERFORMED: Laparoscopic appendectomy.     ATTENDING SURGEON: Agustín Flores M.D.     HOUSESTAFF SURGEON: Juan Diego Rodriguez M.D. (RES)     ANESTHESIA: General endotracheal.     ESTIMATED BLOOD LOSS: minimal     FINDINGS: Acute non-perforated appendicitis.     SPECIMEN: Appendix.     DRAINS: None.     COMPLICATIONS: None.     INDICATIONS: Juan Antonio Mejia is a 74 y.o.female who presented to the Emergency Department with lower abdominal pain. The history and exam were consistent with acute appendicitis, which was confirmed by laboratory studies and a CT scan. We recommended laparoscopic appendectomy and the patient agreed to proceed. The patient signed informed consent and expressed understanding of the risks and benefits of surgery.     OPERATIVE PROCEDURE: The patient was identified in Preoperative Holding and  brought back to the Operating Room. Placed supine on the operating table and padded appropriately. Monitors were applied and there was smooth induction of general endotracheal anesthesia. A Fonseca catheter was placed. The patient's abdomen was prepped and draped in the standard sterile surgical fashion. A time-out was performed and all team members present agreed this was the correct procedure on the correct patient. We also confirmed administration of appropriate preoperative antibiotics.    A 2-cm infraumbilical skin incision was made. Subcutaneous tissue was bluntly dissected. The fascia was grasped with Kocher clamps and elevated and a 1.5-cm midline infraumbilical fascial incision was made. The abdomen was bluntly entered under direct vision. A 0 Vicryl stay suture was placed around the fascial incision in a horizontal mattress fashion. A Zoya trocar was placed and the abdomen was insufflated with carbon dioxide to a maximum pressure of 15 mmHg. A 10-mm laparoscope  was placed and the abdomen was examined. There was no evidence of injury from the initial trocar placement. Two 5-mm trocars were placed under direct vision through separate stab incisions, one in the suprapubic area avoiding the dome of the bladder and one in the left lower quadrant avoiding the inferior epigastric artery. We directed our attention to the right lower quadrant. The appendix was identified and noted to have significant inflammatory changes without evidence of perforation although some murky fluid was noted. The appendix was elevated. A mesenteric defect was made at the base of the appendix using the [place] dissector. The appendix was divided at the base using the Endo-KANDICE stapler with a blue. The mesoappendix was then divided with a ligasure rather than a staple load due to inability to get an appropriate angle with the stapler. The appendix was placed into an Endocatch bag and removed from the Zoya trocar without difficulty. We returned the laparoscope and Zoya trocar to the umbilicus and reexamined the right lower quadrant. The base of the appendix was examined and appeared viable and well-sealed. The right lower quadrant was irrigated with minimal saline and hemostasis was noted. All ports were removed under direct vision and no bleeding from any port site was noted. The insufflation of the abdomen was evacuated and the laparoscope and Zoya trocar were removed. The fascial incision at the umbilical port site was closed with the preexisting 0 Vicryl stitch. All port sites were infiltrated with Marcaine and closed in a subcuticular fashion. Sterile dressings were applied. The patient's Fonseca catheter was removed. The patient was extubated in the Operating Room and transported to the Recovery Room in stable condition. All sponge, instrument and needle counts were correct at the end of the case. Dr. Flores was present and scrubbed for the entire procedure.

## 2019-12-01 NOTE — ED TRIAGE NOTES
Patient began having abdominal pain on Friday.  Small amount of vomitus and states that she is having cramping in her abdominal area.

## 2019-12-01 NOTE — ED PROVIDER NOTES
Encounter Date: 2019       History     Chief Complaint   Patient presents with    Abdominal Pain     last bm yesterday. abd pain and nausea since yesterday.      74-year-old female with hypertension and prediabetes presents for abdominal pain for 1 day.  She endorses periumbilical dull pain which is approximately 2/10 in intensity.  Her pain becomes worse with leaning forward and walking, improved with sitting still.  She reports associated severe nausea with dry heaving.  She took some Zofran at home which did not help.  She has had chills but no fever.  She denies urinary symptoms, changes in bowel movements, chest pain or shortness of breath. She had a  in the 1970s but no other abdominal surgeries.        Review of patient's allergies indicates:   Allergen Reactions    Latex, natural rubber     Penicillins      Past Medical History:   Diagnosis Date    Depression     Diabetes     metformin and diet controlled    HLD (hyperlipidemia)     Mild intermittent asthma     Paget disease, extra mammary     affecting vulva     Past Surgical History:   Procedure Laterality Date     SECTION  1973     Family History   Problem Relation Age of Onset    Colon cancer Mother 79    Glaucoma Mother     Prostate cancer Father     Heart attack Father     Mental retardation Brother     Breast cancer Neg Hx     Diabetes Neg Hx      Social History     Tobacco Use    Smoking status: Former Smoker     Last attempt to quit:      Years since quittin.9    Smokeless tobacco: Never Used   Substance Use Topics    Alcohol use: Not Currently    Drug use: Never     Review of Systems   Constitutional: Positive for chills. Negative for fatigue and fever.   Respiratory: Negative for shortness of breath.    Cardiovascular: Negative for chest pain and palpitations.   Gastrointestinal: Positive for abdominal pain and nausea. Negative for abdominal distention, anal bleeding, blood in stool, constipation,  diarrhea, rectal pain and vomiting.   Endocrine: Negative for polydipsia and polyuria.   Genitourinary: Negative for dysuria, flank pain, hematuria and urgency.   Musculoskeletal: Negative for back pain.   Skin: Negative for rash.   Allergic/Immunologic: Negative for immunocompromised state.   Neurological: Negative for weakness, light-headedness and headaches.   Hematological: Does not bruise/bleed easily.       Physical Exam     Initial Vitals [12/01/19 0904]   BP Pulse Resp Temp SpO2   (!) 159/68 105 18 99.5 °F (37.5 °C) 95 %      MAP       --         Physical Exam    Nursing note and vitals reviewed.  Constitutional: She appears well-developed and well-nourished. She is not diaphoretic. No distress.   Appears uncomfortable but in no acute distress.  Daughter at bedside.   HENT:   Head: Normocephalic and atraumatic.   Eyes: EOM are normal. Pupils are equal, round, and reactive to light.   Neck: Normal range of motion.   Cardiovascular: Normal rate, regular rhythm, normal heart sounds and intact distal pulses. Exam reveals no gallop and no friction rub.    No murmur heard.  Pulmonary/Chest: Breath sounds normal. No respiratory distress. She has no wheezes. She has no rhonchi. She has no rales. She exhibits no tenderness.   Abdominal: Soft. Bowel sounds are normal. She exhibits no distension and no mass. There is tenderness in the epigastric area and periumbilical area. There is guarding. There is no rigidity, no rebound, no CVA tenderness, no tenderness at McBurney's point and negative Grijalva's sign.   Musculoskeletal: Normal range of motion.   Neurological: She is alert and oriented to person, place, and time.   Skin: Skin is warm and dry.   Psychiatric: She has a normal mood and affect.         ED Course   Procedures  Labs Reviewed   CBC W/ AUTO DIFFERENTIAL - Abnormal; Notable for the following components:       Result Value    WBC 20.49 (*)     RBC 5.72 (*)     Immature Granulocytes 0.8 (*)     Gran # (ANC)  17.2 (*)     Immature Grans (Abs) 0.17 (*)     Mono # 1.5 (*)     Gran% 83.8 (*)     Lymph% 7.7 (*)     All other components within normal limits   COMPREHENSIVE METABOLIC PANEL - Abnormal; Notable for the following components:    Glucose 116 (*)     All other components within normal limits   URINALYSIS, REFLEX TO URINE CULTURE - Abnormal; Notable for the following components:    Appearance, UA Cloudy (*)     Specific Gravity, UA >=1.030 (*)     Protein, UA 1+ (*)     Glucose, UA 3+ (*)     Ketones, UA 3+ (*)     Occult Blood UA 2+ (*)     Leukocytes, UA 2+ (*)     All other components within normal limits    Narrative:     Preferred Collection Type->Urine, Clean Catch   URINALYSIS MICROSCOPIC - Abnormal; Notable for the following components:    RBC, UA 9 (*)     WBC, UA 74 (*)     Bacteria Many (*)     Non-Squam Epith 2 (*)     All other components within normal limits    Narrative:     Preferred Collection Type->Urine, Clean Catch   CULTURE, URINE   LIPASE   LACTIC ACID, PLASMA     EKG Readings: (Independently Interpreted)   Initial Reading: No STEMI. Previous EKG: Compared with most recent EKG Previous EKG Date: 9/17/2019. Rhythm: Normal Sinus Rhythm. Heart Rate: 99. Ectopy: No Ectopy. ST Segments: Normal ST Segments. T Waves: Normal. Clinical Impression: Normal Sinus Rhythm     ECG Results          EKG 12-lead (Final result)  Result time 12/01/19 12:10:24    Final result by Interface, Lab In [facility] (12/01/19 12:10:24)                 Narrative:    Test Reason : R10.9,    Vent. Rate : 099 BPM     Atrial Rate : 099 BPM     P-R Int : 158 ms          QRS Dur : 068 ms      QT Int : 362 ms       P-R-T Axes : 037 066 031 degrees     QTc Int : 464 ms    Normal sinus rhythm  Normal ECG  When compared with ECG of 17-SEP-2019 13:08,  No significant change was found    Confirmed by Robert Jeffers MD ([id number]) on 12/1/2019 12:10:21 PM    Referred By: [email or web address]   SELF           Confirmed By:Robert Jeffers MD                             Imaging Results           CT Abdomen Pelvis With Contrast (Final result)  Result time 12/01/19 11:57:06    Final result by Philippe Anthony MD (12/01/19 11:57:06)                 Impression:      Acute appendicitis containing appendicoliths.  No periappendiceal free air or discrete abscess.    Left 1.0 cm adrenal nodule, indeterminate.    Additional findings as above.    This report was flagged in Epic as abnormal.    COMMUNICATION  This critical result was discovered/received at 11:15 on 12/01/2019.  The critical information above was relayed directly by Dr. Bronson to Dr. Davis at 11:19 on 12/01/2019    Electronically signed by resident: Debi Bronson MD  Date:    12/01/2019  Time:    11:10    Electronically signed by: Philippe Anthony  Date:    12/01/2019  Time:    11:57             Narrative:    EXAMINATION:  CT ABDOMEN PELVIS WITH CONTRAST    CLINICAL HISTORY:  RLQ pain, appendicitis suspected;Bowel obstruction, high-grade;Periumbilical abdomnial pain with vomiting, diffuse tenderness;    TECHNIQUE:  Low dose axial images, sagittal and coronal reformations were obtained from the lung bases to the pubic symphysis following the IV administration of 100 mL of Omnipaque 350 .  Oral contrast was not administered.    COMPARISON:  CT calcium score 07/23/2019.    FINDINGS:  No significant pericardial effusion.  Multifocal coronary artery calcifications.  Small hiatal hernia. 33    - Lung bases: No consolidation, pleural effusion or pneumothorax.    - Liver: Normal in size and enhancement.  Subcentimeter hypodensity in the right hepatic which is too small to characterize, however likely represents a cyst.    - Gallbladder: No calcified gallstone.    - Bile Ducts: No evidence of intra or extra hepatic biliary ductal dilation.    - Spleen: Negative.    - Kidneys: Both kidneys are normal in size and enhancement.  No evidence of hydronephrosis or hydroureter.  No nephrolithiasis.  Urinary bladder is unremarkable.   The    - Adrenals: 1 cm left adrenal enhancing nodule, indeterminate.  The right adrenal gland is unremarkable.    - Pancreas: No mass or peripancreatic fat stranding.    - Retroperitoneum:  No significant adenopathy.    - Vascular: Dense aortoiliac calcific atherosclerosis with no evidence of aneurysmal dilatation.    - Abdominal wall:  Subcutaneous stranding of the anterior abdominal wall, potentially related to recent prior injection sites.    The uterus is present with partially calcified fibroids.    Small fat containing umbilical hernia.    Bowel/Mesentery/pelvis:    The appendix is dilated measuring up to 1.6 cm with periappendiceal fat stranding and free fluid.  Multiple intraluminal hyperdensities within the appendix, probable appendicoliths.  No evidence of periappendiceal free air or fluid collection to suggest abscess.  Normal bowel caliber with scattered colonic diverticulosis.    Bones:  No acute osseous abnormality and no suspicious lytic or blastic lesion.  Degenerative changes of the spine.  Grade 1 anterolisthesis of L4 on L5.                                 Medical Decision Making:   History:   Old Medical Records: I decided to obtain old medical records.  Old Records Summarized: records from clinic visits.       <> Summary of Records: No recent ER visits or admissions.  Initial Assessment:   74-year-old female presenting for abdominal pain with nausea.  On ED arrival, she is hypertensive and mildly tachycardic with otherwise normal vitals.  She appears uncomfortable.  She has diffuse tenderness in her abdomen, worse in the epigastrium and periumbilical area with voluntary guarding.  Differential Diagnosis:   Appendicitis  Cholecystitis  Diverticulitis  Lower suspicion for SBO  Independently Interpreted Test(s):   I have ordered and independently interpreted EKG Reading(s) - see prior notes  Clinical Tests:   Lab Tests: Ordered and Reviewed  Radiological Study: Ordered and Reviewed  Medical Tests:  Ordered and Reviewed  ED Management:  Check labs, give Zofran, fluids, do CT abdomen pelvis and reassess.  Patient offered pain medication which she declines at this time.    Labs notable for leukocytosis with WBC count of 20.49.  Will draw lactic acid concern for more serious infection.  Patient now requesting pain medication, will give morphine.    UA with leukocytes and many bacteria.  Will defer antibiotic treatment until CT results.    CT shows acute appendicitis.  Will consult General surgery.    Patient seen and evaluated at bedside by General surgery.  They will admit her for an appendectomy.  Patient comfortable with admission.  I discussed this patient with my supervising physician.    Honey Kenny PA-C    Other:   I have discussed this case with another health care provider.       <> Summary of the Discussion: Discussed this patient with General surgery who will admit the patient to their service for laparoscopic appendectomy.              Attending Attestation:     Physician Attestation Statement for NP/PA:   I discussed this assessment and plan of this patient with the NP/PA, but I did not personally examine the patient. The face to face encounter was performed by the NP/PA.                                Clinical Impression:       ICD-10-CM ICD-9-CM   1. Acute appendicitis K35.80 540.9   2. Abdominal pain R10.9 789.00         Disposition:   Disposition: Admitted  Condition: Fair                     Honey Kenny PA-C  12/01/19 1627       Dagoberto Davis III, MD  12/01/19 [id number]

## 2019-12-01 NOTE — PLAN OF CARE
Pt arrived to room  via stetcher  Pt rates pain as tolerable . Pt denies nausea. Pt denies numbness and tinglng. . Pt dressing clean , dry and intact . Pt oriented to room  And call system . Will continue to monitor .

## 2019-12-01 NOTE — ASSESSMENT & PLAN NOTE
73 y/o F with acute appendicitis.     Admit to General Surgery  NPO  IVF  IV abx  Prn nausea/pain control   To OR for lap appendectomy  Surgical consent obtained

## 2019-12-01 NOTE — ANESTHESIA POSTPROCEDURE EVALUATION
Anesthesia Post Evaluation    Patient: Juan Antonio Mejia    Procedure(s) Performed: Procedure(s) (LRB):  APPENDECTOMY, LAPAROSCOPIC (N/A)    Final Anesthesia Type: general    Patient location during evaluation: PACU  Patient participation: Yes- Able to Participate  Level of consciousness: awake and alert and oriented  Post-procedure vital signs: reviewed and stable  Pain management: adequate  Airway patency: patent    PONV status at discharge: No PONV  Anesthetic complications: no      Cardiovascular status: hemodynamically stable  Respiratory status: unassisted, spontaneous ventilation and room air  Hydration status: euvolemic  Follow-up not needed.          Vitals Value Taken Time   /55 12/1/2019  5:31 PM   Temp 37.2 °C (98.9 °F) 12/1/2019  4:45 PM   Pulse 105 12/1/2019  5:36 PM   Resp 23 12/1/2019  5:36 PM   SpO2 90 % 12/1/2019  5:36 PM   Vitals shown include unvalidated device data.      No case tracking events are documented in the log.      Pain/Amanda Score: Pain Rating Prior to Med Admin: 6 (12/1/2019  5:08 PM)  Amanda Score: 10 (12/1/2019  5:00 PM)

## 2019-12-01 NOTE — ED NOTES
Juan Antonio Mejia, a 74 y.o. female presents to the ED via personal transporation with CC abdominal pain, extreme nausea.  Patient states the pain has began on Friday.  Denies diarrhea, states she is having extreme nausea, and abdominal pain.        Patient identifiers verified verbally with patient and correct for Juan Antonio Mejia.    LOC/ APPEARANCE: The patient is AAOx4. Pt is speaking appropriately, no slurred speech.  SKIN: Skin is warm dry and intact, and color is consistent with ethnicity. Capillary refill <3 seconds. No breakdown or brusing visible. Mucus membranes moist, acyanotic.  RESPIRATORY: Airway is open and patent. Respirations-spontaneous, unlabored, regular rate, equal bilaterally on inspiration and expiration. No accessory muscle use noted. Lungs clear to auscultation in all fields bilaterally anterior and posterior.   CARDIAC: Patient has regular heart rate.  No peripheral edema noted, and patient has no c/o chest pain. Peripheral pulses present equal and strong throughout.  ABDOMEN: Soft and non-tender to palpation with no distention noted. Normoactive bowel sounds x4 quadrants. Patient complains of abdominal cramping.   NEUROLOGIC: Eyes open spontaneously and facial expression symmetrical. Pt behavior appropriate to situation, and pt follows commands. Pt reports sensation present in all extremities when touched with a finger, denies any numbness or tingling. PERRLA  MUSCULOSKELETAL: Spontaneous movement noted to all extremities. Hand  equal and leg strength strong +5 bilaterally.   : No complaints of frequency, burning, urgency or blood in the urine. No complaints of incontinence.

## 2019-12-01 NOTE — SUBJECTIVE & OBJECTIVE
No current facility-administered medications on file prior to encounter.      Current Outpatient Medications on File Prior to Encounter   Medication Sig    albuterol (VENTOLIN HFA) 90 mcg/actuation inhaler Inhale 1 puff into the lungs every 6 (six) hours as needed for Wheezing or Shortness of Breath. Rescue    aspirin (ECOTRIN) 81 MG EC tablet Take 81 mg by mouth once daily.     BELVIQ 10 mg Tab once daily.     BEPREVE 1.5 % Drop     biotin 10,000 mcg Cap Take by mouth once daily.    buPROPion (WELLBUTRIN XL) 150 MG TB24 tablet once daily.     ezetimibe (ZETIA) 10 mg tablet once daily.     fluconazole (DIFLUCAN) 150 MG Tab Take 1 tablet (150 mg total) by mouth every 72 hours as needed.    FLUoxetine 20 MG capsule 20 mg once daily.     glucosamine HCl (GLUCOSAMINE, BULK, MISC) 3,000 mg by Misc.(Non-Drug; Combo Route) route once daily.    imiquimod (ALDARA) 5 % cream once daily.     INVOKANA 300 mg Tab tablet 300 mg once daily.     LATISSE 0.03 % ophthalmic solution Place into both eyes nightly.     levothyroxine (SYNTHROID) 88 MCG tablet Take 88 mcg by mouth before breakfast.     liothyronine (CYTOMEL) 5 MCG Tab 5 mcg once daily.     MAGNESIUM ORAL Take 100 mg by mouth once daily.    metFORMIN (GLUCOPHAGE-XR) 500 MG 24 hr tablet 500 mg 4 (four) times daily as needed.     nystatin-triamcinolone (MYCOLOG II) cream Apply to affected area 2 times daily    OZEMPIC 0.25 mg or 0.5 mg(2 mg/1.5 mL) PnIj once daily.     PAZEO 0.7 % Drop once daily.     pravastatin (PRAVACHOL) 40 MG tablet Take 1 tablet (40 mg total) by mouth once daily.    spironolactone (ALDACTONE) 100 MG tablet Take 50 mg by mouth once daily.     topiramate (TOPAMAX) 25 MG tablet 25 mg once daily.     vitamin D (VITAMIN D3) 1000 units Tab Take 1,000 Units by mouth once daily.    YUVAFEM 10 mcg Tab once daily.     zolpidem (AMBIEN CR) 6.25 MG CR tablet 6.25 mg nightly as needed.        Review of patient's allergies indicates:    Allergen Reactions    Latex, natural rubber     Penicillins        Past Medical History:   Diagnosis Date    Depression     Diabetes     metformin and diet controlled    HLD (hyperlipidemia)     Mild intermittent asthma     Paget disease, extra mammary     affecting vulva     Past Surgical History:   Procedure Laterality Date     SECTION       Family History     Problem Relation (Age of Onset)    Colon cancer Mother (79)    Glaucoma Mother    Heart attack Father    Mental retardation Brother    Prostate cancer Father        Tobacco Use    Smoking status: Former Smoker     Last attempt to quit:      Years since quittin.9    Smokeless tobacco: Never Used   Substance and Sexual Activity    Alcohol use: Not Currently    Drug use: Never    Sexual activity: Not Currently     Review of Systems   Constitutional: Negative for activity change, chills, fatigue and fever.   HENT: Negative for congestion and sore throat.    Eyes: Negative for pain and redness.   Respiratory: Negative for cough and shortness of breath.    Cardiovascular: Negative for chest pain, palpitations and leg swelling.   Gastrointestinal: Positive for abdominal pain, nausea and vomiting. Negative for abdominal distention, constipation and diarrhea.   Genitourinary: Negative for flank pain and hematuria.   Musculoskeletal: Negative for back pain and gait problem.   Skin: Negative for rash and wound.   Neurological: Negative for light-headedness, numbness and headaches.   Hematological: Does not bruise/bleed easily.   Psychiatric/Behavioral: Negative for confusion. The patient is not nervous/anxious.      Objective:     Vital Signs (Most Recent):  Temp: 99.1 °F (37.3 °C) (19 1110)  Pulse: 100 (19 1110)  Resp: 18 (19 1110)  BP: (!) 174/75 (19 1110)  SpO2: 96 % (19 1110) Vital Signs (24h Range):  Temp:  [99.1 °F (37.3 °C)-99.5 °F (37.5 °C)] 99.1 °F (37.3 °C)  Pulse:  [100-105] 100  Resp:  [18]  18  SpO2:  [95 %-96 %] 96 %  BP: (159-174)/(68-75) 174/75     Weight: 77.1 kg (170 lb)  Body mass index is 25.85 kg/m².    Physical Exam   Constitutional: She is oriented to person, place, and time. She appears well-developed and well-nourished.   HENT:   Head: Normocephalic and atraumatic.   Eyes: Conjunctivae and EOM are normal.   Neck: Normal range of motion. Neck supple. No tracheal deviation present.   Cardiovascular: Normal rate and regular rhythm.   Pulmonary/Chest: Effort normal. No respiratory distress.   Abdominal: Soft. She exhibits no distension. There is tenderness (RLQ/periumbilical with light palpation).   Musculoskeletal: Normal range of motion. She exhibits no edema.   Neurological: She is alert and oriented to person, place, and time.   Skin: Skin is warm and dry. She is not diaphoretic.   Psychiatric: She has a normal mood and affect.   Vitals reviewed.      Significant Labs:  Reviewed    Significant Diagnostics:  Reviewed

## 2019-12-01 NOTE — H&P
[facility]  General Surgery  History & Physical    Patient Name: Juan Antonio Mejia  MRN: [id number]  Admission Date: 2019  Attending Physician: Agustín Flores MD  Primary Care Provider: Primary Doctor No    Patient information was obtained from patient and ER records.     Subjective:     Chief Complaint/Reason for Admission: RLQ abdominal pain    History of Present Illness: 73 y/o F with hx of HTN and PAD presented to the ED with RLQ/radha-umbilical abdominal pain. Pain started yesterday morning and has slowly progressed. Rates pain as 7/10, sharp and does not radiate. Pain improved with morphine in ED. Worse with movement/palpation. Associated with nausea with 1 episode of emesis. Denies fever/chills, diarrhea, constipation. Reports a similar episode 25 years ago that resolved spontaneously. PSH includes .     No current facility-administered medications on file prior to encounter.      Current Outpatient Medications on File Prior to Encounter   Medication Sig    albuterol (VENTOLIN HFA) 90 mcg/actuation inhaler Inhale 1 puff into the lungs every 6 (six) hours as needed for Wheezing or Shortness of Breath. Rescue    aspirin (ECOTRIN) 81 MG EC tablet Take 81 mg by mouth once daily.     BELVIQ 10 mg Tab once daily.     BEPREVE 1.5 % Drop     biotin 10,000 mcg Cap Take by mouth once daily.    buPROPion (WELLBUTRIN XL) 150 MG TB24 tablet once daily.     ezetimibe (ZETIA) 10 mg tablet once daily.     fluconazole (DIFLUCAN) 150 MG Tab Take 1 tablet (150 mg total) by mouth every 72 hours as needed.    FLUoxetine 20 MG capsule 20 mg once daily.     glucosamine HCl (GLUCOSAMINE, BULK, MISC) 3,000 mg by Misc.(Non-Drug; Combo Route) route once daily.    imiquimod (ALDARA) 5 % cream once daily.     INVOKANA 300 mg Tab tablet 300 mg once daily.     LATISSE 0.03 % ophthalmic solution Place into both eyes nightly.     levothyroxine (SYNTHROID) 88 MCG tablet Take 88 mcg by mouth before  breakfast.     liothyronine (CYTOMEL) 5 MCG Tab 5 mcg once daily.     MAGNESIUM ORAL Take 100 mg by mouth once daily.    metFORMIN (GLUCOPHAGE-XR) 500 MG 24 hr tablet 500 mg 4 (four) times daily as needed.     nystatin-triamcinolone (MYCOLOG II) cream Apply to affected area 2 times daily    OZEMPIC 0.25 mg or 0.5 mg(2 mg/1.5 mL) PnIj once daily.     PAZEO 0.7 % Drop once daily.     pravastatin (PRAVACHOL) 40 MG tablet Take 1 tablet (40 mg total) by mouth once daily.    spironolactone (ALDACTONE) 100 MG tablet Take 50 mg by mouth once daily.     topiramate (TOPAMAX) 25 MG tablet 25 mg once daily.     vitamin D (VITAMIN D3) 1000 units Tab Take 1,000 Units by mouth once daily.    YUVAFEM 10 mcg Tab once daily.     zolpidem (AMBIEN CR) 6.25 MG CR tablet 6.25 mg nightly as needed.        Review of patient's allergies indicates:   Allergen Reactions    Latex, natural rubber     Penicillins        Past Medical History:   Diagnosis Date    Depression     Diabetes     metformin and diet controlled    HLD (hyperlipidemia)     Mild intermittent asthma     Paget disease, extra mammary     affecting vulva     Past Surgical History:   Procedure Laterality Date     SECTION       Family History     Problem Relation (Age of Onset)    Colon cancer Mother (79)    Glaucoma Mother    Heart attack Father    Mental retardation Brother    Prostate cancer Father        Tobacco Use    Smoking status: Former Smoker     Last attempt to quit:      Years since quittin.9    Smokeless tobacco: Never Used   Substance and Sexual Activity    Alcohol use: Not Currently    Drug use: Never    Sexual activity: Not Currently     Review of Systems   Constitutional: Negative for activity change, chills, fatigue and fever.   HENT: Negative for congestion and sore throat.    Eyes: Negative for pain and redness.   Respiratory: Negative for cough and shortness of breath.    Cardiovascular: Negative for chest pain,  palpitations and leg swelling.   Gastrointestinal: Positive for abdominal pain, nausea and vomiting. Negative for abdominal distention, constipation and diarrhea.   Genitourinary: Negative for flank pain and hematuria.   Musculoskeletal: Negative for back pain and gait problem.   Skin: Negative for rash and wound.   Neurological: Negative for light-headedness, numbness and headaches.   Hematological: Does not bruise/bleed easily.   Psychiatric/Behavioral: Negative for confusion. The patient is not nervous/anxious.      Objective:     Vital Signs (Most Recent):  Temp: 99.1 °F (37.3 °C) (12/01/19 1110)  Pulse: 100 (12/01/19 1110)  Resp: 18 (12/01/19 1110)  BP: (!) 174/75 (12/01/19 1110)  SpO2: 96 % (12/01/19 1110) Vital Signs (24h Range):  Temp:  [99.1 °F (37.3 °C)-99.5 °F (37.5 °C)] 99.1 °F (37.3 °C)  Pulse:  [100-105] 100  Resp:  [18] 18  SpO2:  [95 %-96 %] 96 %  BP: (159-174)/(68-75) 174/75     Weight: 77.1 kg (170 lb)  Body mass index is 25.85 kg/m².    Physical Exam   Constitutional: She is oriented to person, place, and time. She appears well-developed and well-nourished.   HENT:   Head: Normocephalic and atraumatic.   Eyes: Conjunctivae and EOM are normal.   Neck: Normal range of motion. Neck supple. No tracheal deviation present.   Cardiovascular: Normal rate and regular rhythm.   Pulmonary/Chest: Effort normal. No respiratory distress.   Abdominal: Soft. She exhibits no distension. There is tenderness (RLQ/periumbilical with light palpation).   Musculoskeletal: Normal range of motion. She exhibits no edema.   Neurological: She is alert and oriented to person, place, and time.   Skin: Skin is warm and dry. She is not diaphoretic.   Psychiatric: She has a normal mood and affect.   Vitals reviewed.      Significant Labs:  Reviewed    Significant Diagnostics:  Reviewed    Assessment/Plan:     Acute appendicitis  73 y/o F with acute appendicitis.     Admit to General Surgery  NPO  IVF  IV abx  Prn nausea/pain  control   To OR for lap appendectomy  Surgical consent obtained      VTE Risk Mitigation (From admission, onward)         Ordered     enoxaparin injection 40 mg  Daily      12/01/19 1218     IP VTE HIGH RISK PATIENT  Once      12/01/19 1218                Juan Diego Rodriguez MD  General Surgery  [facility]

## 2019-12-01 NOTE — ANESTHESIA PREPROCEDURE EVALUATION
12/01/2019    [facility]  Anesthesia Pre-Operative Evaluation         Patient Name: Juan Antonio Mejia  YOB: 1945  MRN: [id number]    SUBJECTIVE:     Pre-operative evaluation for Procedure(s) (LRB):  APPENDECTOMY, LAPAROSCOPIC (N/A)     12/01/2019    Juan Antonio Mejia is a 74 y.o. female w/ a significant PMHx of HTN, HLD, PAD, DM, mild exercise induced asthma, and hypothyroidism who presents with acute appendicitis. Last took PO at 8PM 11/30/19.     Patient now presents for the above procedure(s).      LDA:        Peripheral IV - Single Lumen 12/01/19 0925 20 G Left Antecubital (Active)   Site Assessment Clean;Dry;Intact 12/1/2019  9:34 AM   Number of days: 0     Prev airway: None documented.    Drips: None documented.      Patient Active Problem List   Diagnosis    Bilateral carotid artery stenosis    Dyslipidemia    Prediabetes    Overweight (BMI 25.0-29.9)    Statin intolerance    Hypothyroidism due to acquired atrophy of thyroid    Agatston CAC score, >400    Vitamin D deficiency    Paget's disease of vulva    Depression    HLD (hyperlipidemia)    Insomnia    Solar lentigo    Mild intermittent asthma    Essential hypertension    Acute appendicitis       Review of patient's allergies indicates:   Allergen Reactions    Latex, natural rubber     Penicillins        Current Inpatient Medications:   enoxaparin  40 mg Subcutaneous Daily       No current facility-administered medications on file prior to encounter.      Current Outpatient Medications on File Prior to Encounter   Medication Sig Dispense Refill    albuterol (VENTOLIN HFA) 90 mcg/actuation inhaler Inhale 1 puff into the lungs every 6 (six) hours as needed for Wheezing or Shortness of Breath. Rescue 16 g 5    aspirin (ECOTRIN) 81 MG EC tablet Take 81 mg by mouth once daily.       BELVIQ 10 mg Tab once  daily.       BEPREVE 1.5 % Drop       biotin 10,000 mcg Cap Take by mouth once daily.      buPROPion (WELLBUTRIN XL) 150 MG TB24 tablet once daily.       ezetimibe (ZETIA) 10 mg tablet once daily.       fluconazole (DIFLUCAN) 150 MG Tab Take 1 tablet (150 mg total) by mouth every 72 hours as needed. 2 tablet 0    FLUoxetine 20 MG capsule 20 mg once daily.       glucosamine HCl (GLUCOSAMINE, BULK, MISC) 3,000 mg by Misc.(Non-Drug; Combo Route) route once daily.      imiquimod (ALDARA) 5 % cream once daily.       INVOKANA 300 mg Tab tablet 300 mg once daily.       LATISSE 0.03 % ophthalmic solution Place into both eyes nightly.       levothyroxine (SYNTHROID) 88 MCG tablet Take 88 mcg by mouth before breakfast.       liothyronine (CYTOMEL) 5 MCG Tab 5 mcg once daily.       MAGNESIUM ORAL Take 100 mg by mouth once daily.      metFORMIN (GLUCOPHAGE-XR) 500 MG 24 hr tablet 500 mg 4 (four) times daily as needed.       nystatin-triamcinolone (MYCOLOG II) cream Apply to affected area 2 times daily 30 g 1    OZEMPIC 0.25 mg or 0.5 mg(2 mg/1.5 mL) PnIj once daily.       PAZEO 0.7 % Drop once daily.       pravastatin (PRAVACHOL) 40 MG tablet Take 1 tablet (40 mg total) by mouth once daily. 90 tablet 3    spironolactone (ALDACTONE) 100 MG tablet Take 50 mg by mouth once daily.       topiramate (TOPAMAX) 25 MG tablet 25 mg once daily.       vitamin D (VITAMIN D3) 1000 units Tab Take 1,000 Units by mouth once daily.      YUVAFEM 10 mcg Tab once daily.       zolpidem (AMBIEN CR) 6.25 MG CR tablet 6.25 mg nightly as needed.          Past Surgical History:   Procedure Laterality Date     SECTION         Social History     Socioeconomic History    Marital status:      Spouse name: Not on file    Number of children: Not on file    Years of education: Not on file    Highest education level: Not on file   Occupational History    Not on file   Social Needs    Financial resource strain: Not  on file    Food insecurity:     Worry: Not on file     Inability: Not on file    Transportation needs:     Medical: Not on file     Non-medical: Not on file   Tobacco Use    Smoking status: Former Smoker     Last attempt to quit: 1984     Years since quittin.9    Smokeless tobacco: Never Used   Substance and Sexual Activity    Alcohol use: Not Currently    Drug use: Never    Sexual activity: Not Currently   Lifestyle    Physical activity:     Days per week: Not on file     Minutes per session: Not on file    Stress: Not on file   Relationships    Social connections:     Talks on phone: Not on file     Gets together: Not on file     Attends Judaism service: Not on file     Active member of club or organization: Not on file     Attends meetings of clubs or organizations: Not on file     Relationship status: Not on file   Other Topics Concern    Not on file   Social History Narrative    Not on file       OBJECTIVE:     Vital Signs Range (Last 24H):  Temp:  [37.3 °C (99.1 °F)-37.5 °C (99.5 °F)]   Pulse:  [100-105]   Resp:  [18]   BP: (159-174)/(68-75)   SpO2:  [95 %-96 %]       Significant Labs:  Lab Results   Component Value Date    WBC 20.49 (H) 2019    HGB 15.7 2019    HCT 48.5 2019     2019    CHOL 117 (L) 2019    TRIG 122 2019    HDL 45 2019    ALT 18 2019    AST 13 2019     2019    K 4.0 2019     2019    CREATININE 0.8 2019    BUN 11 2019    CO2 23 2019    TSH 0.267 (L) 2019    HGBA1C 5.7 (H) 2019       Diagnostic Studies: No relevant studies.    EKG:   Results for orders placed or performed during the hospital encounter of 19   EKG 12-lead    Collection Time: 19  9:52 AM    Narrative    Test Reason : R10.9,    Vent. Rate : 099 BPM     Atrial Rate : 099 BPM     P-R Int : 158 ms          QRS Dur : 068 ms      QT Int : 362 ms       P-R-T Axes : 037 066 031 degrees      QTc Int : 464 ms    Normal sinus rhythm  Normal ECG  When compared with ECG of 17-SEP-2019 13:08,  No significant change was found    Confirmed by Robert Jeffers MD ([id number]) on 12/1/2019 12:10:21 PM    Referred By: BLAZE   SELF           Confirmed By:Robert Jeffers MD       2D ECHO:  TTE:  No results found for this or any previous visit.    CISCO:  No results found for this or any previous visit.    ASSESSMENT/PLAN:         Anesthesia Evaluation    I have reviewed the Patient Summary Reports.    I have reviewed the Nursing Notes.   I have reviewed the Medications.     Review of Systems  Anesthesia Hx:  No problems with previous Anesthesia  History of prior surgery of interest to airway management or planning: Denies Family Hx of Anesthesia complications.   Denies Personal Hx of Anesthesia complications.   Social:  Non-Smoker, No Alcohol Use    Hematology/Oncology:  Hematology Normal   Oncology Normal     EENT/Dental:EENT/Dental Normal   Cardiovascular:   Hypertension Denies MI. CAD     Denies Angina. hyperlipidemia    Pulmonary:   Asthma mild    Renal/:  Renal/ Normal     Hepatic/GI:  Hepatic/GI Normal    Neurological:  Neurology Normal    Endocrine:   Diabetes Hypothyroidism    Psych:   depression          Physical Exam  General:  Well nourished    Airway/Jaw/Neck:  Airway Findings: Mouth Opening: Normal Tongue: Normal  General Airway Assessment: Adult  Mallampati: II  Improves to II with phonation.  TM Distance: Normal, at least 6 cm  Jaw/Neck Findings:  Neck ROM: Normal ROM     Eyes/Ears/Nose:  EYES/EARS/NOSE FINDINGS: Normal   Dental:  Dental Findings: In tact   Chest/Lungs:  Chest/Lungs Clear    Heart/Vascular:  Heart Findings: Normal       Mental Status:  Mental Status Findings:  Cooperative, Alert and Oriented         Anesthesia Plan  Type of Anesthesia, risks & benefits discussed:  Anesthesia Type:  general  Patient's Preference:   Intra-op Monitoring Plan: standard ASA monitors  Intra-op Monitoring Plan  Comments:   Post Op Pain Control Plan: IV/PO Opioids PRN, per primary service following discharge from PACU and multimodal analgesia  Post Op Pain Control Plan Comments:   Induction:   IV  Beta Blocker:  Patient is not currently on a Beta-Blocker (No further documentation required).       Informed Consent: Patient representative understands risks and agrees with Anesthesia plan.  Questions answered. Anesthesia consent signed with patient representative.  ASA Score: 2  emergent   Day of Surgery Review of History & Physical:    H&P update referred to the provider.         Ready For Surgery From Anesthesia Perspective.

## 2019-12-01 NOTE — TRANSFER OF CARE
"Anesthesia Transfer of Care Note    Patient: Juan Antonio Mejia    Procedure(s) Performed: Procedure(s) (LRB):  APPENDECTOMY, LAPAROSCOPIC (N/A)    Patient location: PACU    Anesthesia Type: general    Transport from OR: Transported from OR on 6-10 L/min O2 by face mask with adequate spontaneous ventilation    Post pain: adequate analgesia    Post assessment: no apparent anesthetic complications    Post vital signs: stable    Level of consciousness: awake and alert    Nausea/Vomiting: no nausea/vomiting    Complications: none    Transfer of care protocol was followed      Last vitals:   Visit Vitals  BP (!) 156/67   Pulse 107   Temp 37.2 °C (98.9 °F) (Oral)   Resp (!) 22   Ht 5' 8" (1.727 m)   Wt 79.7 kg (175 lb 9.5 oz)   LMP 09/01/1992 (Approximate)   SpO2 100%   Breastfeeding? No   BMI 26.70 kg/m²     "

## 2019-12-01 NOTE — HPI
75 y/o F with hx of HTN and PAD presented to the ED with RLQ/radha-umbilical abdominal pain. Pain started yesterday morning and has slowly progressed. Rates pain as 7/10, sharp and does not radiate. Pain improved with morphine in ED. Worse with movement/palpation. Associated with nausea with 1 episode of emesis. Denies fever/chills, diarrhea, constipation. Reports a similar episode 25 years ago that resolved spontaneously. PSH includes .

## 2019-12-02 VITALS
HEIGHT: 68 IN | TEMPERATURE: 99 F | WEIGHT: 175.63 LBS | BODY MASS INDEX: 26.62 KG/M2 | OXYGEN SATURATION: 93 % | HEART RATE: 94 BPM | DIASTOLIC BLOOD PRESSURE: 63 MMHG | SYSTOLIC BLOOD PRESSURE: 136 MMHG | RESPIRATION RATE: 16 BRPM

## 2019-12-02 LAB
ALBUMIN SERPL BCP-MCNC: 2.8 G/DL (ref 3.5–5.2)
ALP SERPL-CCNC: 64 U/L (ref 55–135)
ALT SERPL W/O P-5'-P-CCNC: 12 U/L (ref 10–44)
ANION GAP SERPL CALC-SCNC: 10 MMOL/L (ref 8–16)
AST SERPL-CCNC: 11 U/L (ref 10–40)
BACTERIA UR CULT: ABNORMAL
BASOPHILS # BLD AUTO: 0.04 K/UL (ref 0–0.2)
BASOPHILS NFR BLD: 0.3 % (ref 0–1.9)
BILIRUB SERPL-MCNC: 1 MG/DL (ref 0.1–1)
BUN SERPL-MCNC: 11 MG/DL (ref 8–23)
CALCIUM SERPL-MCNC: 8.2 MG/DL (ref 8.7–10.5)
CHLORIDE SERPL-SCNC: 105 MMOL/L (ref 95–110)
CO2 SERPL-SCNC: 22 MMOL/L (ref 23–29)
CREAT SERPL-MCNC: 0.6 MG/DL (ref 0.5–1.4)
DIFFERENTIAL METHOD: ABNORMAL
EOSINOPHIL # BLD AUTO: 0.1 K/UL (ref 0–0.5)
EOSINOPHIL NFR BLD: 0.8 % (ref 0–8)
ERYTHROCYTE [DISTWIDTH] IN BLOOD BY AUTOMATED COUNT: 14.4 % (ref 11.5–14.5)
EST. GFR  (AFRICAN AMERICAN): >60 ML/MIN/1.73 M^2
EST. GFR  (NON AFRICAN AMERICAN): >60 ML/MIN/1.73 M^2
GLUCOSE SERPL-MCNC: 105 MG/DL (ref 70–110)
HCT VFR BLD AUTO: 40.7 % (ref 37–48.5)
HGB BLD-MCNC: 12.7 G/DL (ref 12–16)
IMM GRANULOCYTES # BLD AUTO: 0.1 K/UL (ref 0–0.04)
IMM GRANULOCYTES NFR BLD AUTO: 0.7 % (ref 0–0.5)
LYMPHOCYTES # BLD AUTO: 2.1 K/UL (ref 1–4.8)
LYMPHOCYTES NFR BLD: 14.5 % (ref 18–48)
MAGNESIUM SERPL-MCNC: 1.8 MG/DL (ref 1.6–2.6)
MCH RBC QN AUTO: 26.7 PG (ref 27–31)
MCHC RBC AUTO-ENTMCNC: 31.2 G/DL (ref 32–36)
MCV RBC AUTO: 86 FL (ref 82–98)
MONOCYTES # BLD AUTO: 1.1 K/UL (ref 0.3–1)
MONOCYTES NFR BLD: 7.8 % (ref 4–15)
NEUTROPHILS # BLD AUTO: 11.1 K/UL (ref 1.8–7.7)
NEUTROPHILS NFR BLD: 75.9 % (ref 38–73)
NRBC BLD-RTO: 0 /100 WBC
PHOSPHATE SERPL-MCNC: 2.9 MG/DL (ref 2.7–4.5)
PLATELET # BLD AUTO: 205 K/UL (ref 150–350)
PMV BLD AUTO: 10.1 FL (ref 9.2–12.9)
POCT GLUCOSE: 92 MG/DL (ref 70–110)
POTASSIUM SERPL-SCNC: 3.7 MMOL/L (ref 3.5–5.1)
PROT SERPL-MCNC: 5.8 G/DL (ref 6–8.4)
RBC # BLD AUTO: 4.75 M/UL (ref 4–5.4)
SODIUM SERPL-SCNC: 137 MMOL/L (ref 136–145)
WBC # BLD AUTO: 14.58 K/UL (ref 3.9–12.7)

## 2019-12-02 PROCEDURE — 36415 COLL VENOUS BLD VENIPUNCTURE: CPT

## 2019-12-02 PROCEDURE — 99219 PR INITIAL OBSERVATION CARE,LEVL II: CPT | Mod: 57,,, | Performed by: SURGERY

## 2019-12-02 PROCEDURE — 83735 ASSAY OF MAGNESIUM: CPT

## 2019-12-02 PROCEDURE — 84100 ASSAY OF PHOSPHORUS: CPT

## 2019-12-02 PROCEDURE — G0378 HOSPITAL OBSERVATION PER HR: HCPCS

## 2019-12-02 PROCEDURE — 85025 COMPLETE CBC W/AUTO DIFF WBC: CPT

## 2019-12-02 PROCEDURE — 25000003 PHARM REV CODE 250: Performed by: STUDENT IN AN ORGANIZED HEALTH CARE EDUCATION/TRAINING PROGRAM

## 2019-12-02 PROCEDURE — 80053 COMPREHEN METABOLIC PANEL: CPT

## 2019-12-02 PROCEDURE — 99219 PR INITIAL OBSERVATION CARE,LEVL II: ICD-10-PCS | Mod: 57,,, | Performed by: SURGERY

## 2019-12-02 RX ORDER — HYDROCODONE BITARTRATE AND ACETAMINOPHEN 5; 325 MG/1; MG/1
1 TABLET ORAL EVERY 6 HOURS PRN
Qty: 15 TABLET | Refills: 0 | Status: SHIPPED | OUTPATIENT
Start: 2019-12-02 | End: 2021-02-23

## 2019-12-02 RX ORDER — OXYCODONE AND ACETAMINOPHEN 5; 325 MG/1; MG/1
TABLET ORAL
Status: DISCONTINUED
Start: 2019-12-02 | End: 2019-12-02 | Stop reason: WASHOUT

## 2019-12-02 RX ADMIN — HYDROCODONE BITARTRATE AND ACETAMINOPHEN 1 TABLET: 10; 325 TABLET ORAL at 03:12

## 2019-12-02 RX ADMIN — HYDROCODONE BITARTRATE AND ACETAMINOPHEN 1 TABLET: 5; 325 TABLET ORAL at 10:12

## 2019-12-02 NOTE — PROGRESS NOTES
[facility]  General Surgery  Progress Note    Subjective:     History of Present Illness:  73 y/o F with hx of HTN and PAD presented to the ED with RLQ/radha-umbilical abdominal pain. Pain started yesterday morning and has slowly progressed. Rates pain as 7/10, sharp and does not radiate. Pain improved with morphine in ED. Worse with movement/palpation. Associated with nausea with 1 episode of emesis. Denies fever/chills, diarrhea, constipation. Reports a similar episode 25 years ago that resolved spontaneously. PSH includes .     Post-Op Info:  Procedure(s) (LRB):  APPENDECTOMY, LAPAROSCOPIC (N/A)   1 Day Post-Op     Interval History:   Patient seen and examined, no acute events overnight  C/o abdominal soreness  Tolerated clears with no N/V  Afebrile/VSS    Medications:  Continuous Infusions:  Scheduled Meds:   aspirin  81 mg Oral Daily    buPROPion  150 mg Oral Daily    enoxaparin  40 mg Subcutaneous Daily    FLUoxetine  20 mg Oral Daily    levothyroxine  88 mcg Oral Before breakfast    liothyronine  5 mcg Oral Daily    spironolactone  50 mg Oral Daily    topiramate  25 mg Oral Daily     PRN Meds:acetaminophen, albuterol, Dextrose 10% Bolus, Dextrose 10% Bolus, glucagon (human recombinant), glucose, glucose, HYDROcodone-acetaminophen, HYDROcodone-acetaminophen, insulin aspart U-100, ondansetron, sodium chloride 0.9%     Review of patient's allergies indicates:   Allergen Reactions    Latex, natural rubber     Penicillins      Objective:     Vital Signs (Most Recent):  Temp: 97.7 °F (36.5 °C) (19)  Pulse: 92 (19)  Resp: 18 (19)  BP: (!) 111/53 (19)  SpO2: 96 % (19) Vital Signs (24h Range):  Temp:  [97.6 °F (36.4 °C)-99.5 °F (37.5 °C)] 97.7 °F (36.5 °C)  Pulse:  [] 92  Resp:  [14-22] 18  SpO2:  [90 %-100 %] 96 %  BP: (111-174)/(53-75) 111/53     Weight: 79.7 kg (175 lb 9.5 oz)  Body mass index is 26.7  kg/m².    Intake/Output - Last 3 Shifts       11/30 0700 - 12/01 0659 12/01 0700 - 12/02 0659 12/02 0700 - 12/03 0659    P.O.  420     I.V. (mL/kg)  1000 (12.6)     IV Piggyback  1000     Total Intake(mL/kg)  2420 (30.4)     Urine (mL/kg/hr)  80     Blood  25     Total Output  105     Net  +2315            Urine Occurrence  4 x           Physical Exam   Constitutional: She appears well-developed and well-nourished. No distress.   HENT:   Head: Normocephalic and atraumatic.   Cardiovascular: Normal rate and regular rhythm.   Pulmonary/Chest: Effort normal. No respiratory distress.   Abdominal:   Soft, appropriate TTP, non distended  Dermabond in place - clean, dry and intact        Significant Labs:  CBC:   Recent Labs   Lab 12/02/19 0419   WBC 14.58*   RBC 4.75   HGB 12.7   HCT 40.7      MCV 86   MCH 26.7*   MCHC 31.2*     BMP:   Recent Labs   Lab 12/02/19 0419         K 3.7      CO2 22*   BUN 11   CREATININE 0.6   CALCIUM 8.2*   MG 1.8     CMP:   Recent Labs   Lab 12/02/19 0419      CALCIUM 8.2*   ALBUMIN 2.8*   PROT 5.8*      K 3.7   CO2 22*      BUN 11   CREATININE 0.6   ALKPHOS 64   ALT 12   AST 11   BILITOT 1.0     LFTs:   Recent Labs   Lab 12/02/19 0419   ALT 12   AST 11   ALKPHOS 64   BILITOT 1.0   PROT 5.8*   ALBUMIN 2.8*     Assessment/Plan:     * Acute appendicitis  73 y/o F with acute appendicitis, s/p lap appy 12/1    Regular diet  Prn nausea/pain control   DVT prophylaxis  Plan for dc today pending toleration of diet    Essential hypertension  Spironolactone     Depression  Wellbutrin, prozac    Hypothyroidism due to acquired atrophy of thyroid  Levothyroxine, liothyronine        Magnolia Godwin PA-C   [id number]  General Surgery  [facility]

## 2019-12-02 NOTE — DISCHARGE SUMMARY
[facility]  General Surgery  Discharge Summary      Patient Name: Juan Antonio Mejia  MRN: [id number]  Admission Date: 2019  Hospital Length of Stay: 0 days  Discharge Date and Time:  2019 11:02 AM  Attending Physician: Agustín Flores MD   Discharging Provider: Magnolia Godwin PA-C  Primary Care Provider: Primary Doctor No    HPI:   75 y/o F with hx of HTN and PAD presented to the ED with RLQ/radha-umbilical abdominal pain. Pain started yesterday morning and has slowly progressed. Rates pain as 7/10, sharp and does not radiate. Pain improved with morphine in ED. Worse with movement/palpation. Associated with nausea with 1 episode of emesis. Denies fever/chills, diarrhea, constipation. Reports a similar episode 25 years ago that resolved spontaneously. PSH includes .     Procedure(s) (LRB):  APPENDECTOMY, LAPAROSCOPIC (N/A)      Indwelling Lines/Drains at time of discharge:   Lines/Drains/Airways     None               Hospital Course:   Patient presented to the ER with acute appendicitis. She underwent: Procedure(s) (LRB):  APPENDECTOMY, LAPAROSCOPIC (N/A). She tolerated the procedure well and was transferred to the floor after recovery from anesthesia. Please see the operative note for further procedure details. Vitals remained stable, and she was afebrile all throughout the post operative period. Labs were reviewed and electrolytes were replaced appropriately. Physical exam was appropriate for post operative state.  Diet was advanced, and she was able to tolerate a regular diet prior to discharge. She was ambulating without difficulty and had normal bowel function prior to discharge. Patient was deemed suitable for discharge on 1 Day Post-Op. She was discharged home with medications and instructions as below. She voiced understanding of the instructions prior to discharge.     For more thorough information, please refer to the hospital record and operative report.    Consults:    Consults (From admission, onward)        Status Ordering Provider     Inpatient consult to General Surgery  Once     Provider:  (Not yet assigned)    Completed GUANACO CLARK          Significant Diagnostic Studies: Labs:   BMP:   Recent Labs   Lab 12/01/19  0952 12/02/19 0419   * 105    137   K 4.0 3.7    105   CO2 23 22*   BUN 11 11   CREATININE 0.8 0.6   CALCIUM 9.6 8.2*   MG  --  1.8   , CMP   Recent Labs   Lab 12/01/19  0952 12/02/19  0419    137   K 4.0 3.7    105   CO2 23 22*   * 105   BUN 11 11   CREATININE 0.8 0.6   CALCIUM 9.6 8.2*   PROT 7.9 5.8*   ALBUMIN 4.1 2.8*   BILITOT 0.8 1.0   ALKPHOS 87 64   AST 13 11   ALT 18 12   ANIONGAP 11 10   ESTGFRAFRICA >60.0 >60.0   EGFRNONAA >60.0 >60.0   , CBC   Recent Labs   Lab 12/01/19  0952 12/02/19 0419   WBC 20.49* 14.58*   HGB 15.7 12.7   HCT 48.5 40.7    205    All labs within the past 24 hours have been reviewed    Radiology:   CT scan: CT ABDOMEN PELVIS WITH CONTRAST:   Results for orders placed or performed during the hospital encounter of 12/01/19   CT Abdomen Pelvis With Contrast    Narrative    EXAMINATION:  CT ABDOMEN PELVIS WITH CONTRAST    CLINICAL HISTORY:  RLQ pain, appendicitis suspected;Bowel obstruction, high-grade;Periumbilical abdomnial pain with vomiting, diffuse tenderness;    TECHNIQUE:  Low dose axial images, sagittal and coronal reformations were obtained from the lung bases to the pubic symphysis following the IV administration of 100 mL of Omnipaque 350 .  Oral contrast was not administered.    COMPARISON:  CT calcium score 07/23/2019.    FINDINGS:  No significant pericardial effusion.  Multifocal coronary artery calcifications.  Small hiatal hernia. 33    - Lung bases: No consolidation, pleural effusion or pneumothorax.    - Liver: Normal in size and enhancement.  Subcentimeter hypodensity in the right hepatic which is too small to characterize, however likely represents a cyst.    -  Gallbladder: No calcified gallstone.    - Bile Ducts: No evidence of intra or extra hepatic biliary ductal dilation.    - Spleen: Negative.    - Kidneys: Both kidneys are normal in size and enhancement.  No evidence of hydronephrosis or hydroureter.  No nephrolithiasis.  Urinary bladder is unremarkable.  The    - Adrenals: 1 cm left adrenal enhancing nodule, indeterminate.  The right adrenal gland is unremarkable.    - Pancreas: No mass or peripancreatic fat stranding.    - Retroperitoneum:  No significant adenopathy.    - Vascular: Dense aortoiliac calcific atherosclerosis with no evidence of aneurysmal dilatation.    - Abdominal wall:  Subcutaneous stranding of the anterior abdominal wall, potentially related to recent prior injection sites.    The uterus is present with partially calcified fibroids.    Small fat containing umbilical hernia.    Bowel/Mesentery/pelvis:    The appendix is dilated measuring up to 1.6 cm with periappendiceal fat stranding and free fluid.  Multiple intraluminal hyperdensities within the appendix, probable appendicoliths.  No evidence of periappendiceal free air or fluid collection to suggest abscess.  Normal bowel caliber with scattered colonic diverticulosis.    Bones:  No acute osseous abnormality and no suspicious lytic or blastic lesion.  Degenerative changes of the spine.  Grade 1 anterolisthesis of L4 on L5.      Impression    Acute appendicitis containing appendicoliths.  No periappendiceal free air or discrete abscess.    Left 1.0 cm adrenal nodule, indeterminate.    Additional findings as above.    This report was flagged in Epic as abnormal.    COMMUNICATION  This critical result was discovered/received at 11:15 on 12/01/2019.  The critical information above was relayed directly by Dr. Bronson to Dr. Davis at 11:19 on 12/01/2019    Electronically signed by resident: Debi Bronson MD  Date:    12/01/2019  Time:    11:10    Electronically signed by: Philippe  Raj  Date:    12/01/2019  Time:    11:57     Pending Diagnostic Studies:     Procedure Component Value Units Date/Time    Specimen to Pathology, Surgery General Surgery [[id number]] Collected:  12/01/19 1617    Order Status:  Sent Lab Status:  In process Updated:  12/01/19 1618        Final Active Diagnoses:    Diagnosis Date Noted POA    PRINCIPAL PROBLEM:  Acute appendicitis [K35.80] 12/01/2019 Yes    Depression [F32.9] 09/11/2019 Yes    Essential hypertension [I10] 09/11/2019 Yes    Hypothyroidism due to acquired atrophy of thyroid [E03.4] 06/18/2019 Yes      Problems Resolved During this Admission:      Patient Active Problem List   Diagnosis    Bilateral carotid artery stenosis    Dyslipidemia    Prediabetes    Overweight (BMI 25.0-29.9)    Statin intolerance    Hypothyroidism due to acquired atrophy of thyroid    Agatston CAC score, >400    Vitamin D deficiency    Paget's disease of vulva    Depression    HLD (hyperlipidemia)    Insomnia    Solar lentigo    Mild intermittent asthma    Essential hypertension    Acute appendicitis     Discharged Condition: good    Disposition: Home or Self Care    Follow Up:  Follow-up Information     Agustín Flores MD. Schedule an appointment as soon as possible for a visit in 2 weeks.    Specialty:  General Surgery  Why:  Post-op Appt. Office to arrange & notify you.   Contact information:  [address] [facility] [address] [phone]                 Patient Instructions:      Diet Adult Regular     Lifting restrictions   Order Comments: May not lift more than 10 lbs for 4 weeks from day of surgery.  No pushing/pulling such as vacuuming or raking.  No straining, avoid constipation, and take stool softeners as described and laxatives as needed.  No driving while on narcotics and until you can react quickly without pain.     Notify your health care provider if you experience any of the following:  temperature >100.4     Notify your health care  provider if you experience any of the following:  persistent nausea and vomiting or diarrhea     Notify your health care provider if you experience any of the following:  severe uncontrolled pain     Notify your health care provider if you experience any of the following:  redness, tenderness, or signs of infection (pain, swelling, redness, odor or green/yellow discharge around incision site)     Notify your health care provider if you experience any of the following:  difficulty breathing or increased cough     Notify your health care provider if you experience any of the following:  severe persistent headache     Notify your health care provider if you experience any of the following:  worsening rash     Notify your health care provider if you experience any of the following:  persistent dizziness, light-headedness, or visual disturbances     Notify your health care provider if you experience any of the following:  increased confusion or weakness     Remove dressing in 24 hours     Activity as tolerated   Order Comments: OK to shower in 24 hours.  Do not submerge your incision for 2 weeks.     Medications:  Reconciled Home Medications:      Medication List      START taking these medications    HYDROcodone-acetaminophen 5-325 mg per tablet  Commonly known as:  NORCO  Take 1 tablet by mouth every 6 (six) hours as needed for Pain (Do not drive while taking this medication.).        CONTINUE taking these medications    albuterol 90 mcg/actuation inhaler  Commonly known as:  Ventolin HFA  Inhale 1 puff into the lungs every 6 (six) hours as needed for Wheezing or Shortness of Breath. Rescue     aspirin 81 MG EC tablet  Commonly known as:  ECOTRIN  Take 81 mg by mouth once daily.     Belviq 10 mg Tab  Generic drug:  lorcaserin  once daily.     Bepreve 1.5 % Drop  Generic drug:  bepotastine besilate     biotin 10,000 mcg Cap  Take by mouth once daily.     buPROPion 150 MG TB24 tablet  Commonly known as:  WELLBUTRIN  XL  once daily.     ezetimibe 10 mg tablet  Commonly known as:  ZETIA  once daily.     fluconazole 150 MG Tab  Commonly known as:  DIFLUCAN  Take 1 tablet (150 mg total) by mouth every 72 hours as needed.     FLUoxetine 20 MG capsule  20 mg once daily.     GLUCOSAMINE (BULK) MISC  3,000 mg by Misc.(Non-Drug; Combo Route) route once daily.     imiquimod 5 % cream  Commonly known as:  ALDARA  once daily.     Invokana 300 mg Tab tablet  Generic drug:  canagliflozin  300 mg once daily.     Latisse 0.03 % ophthalmic solution  Generic drug:  bimatoprost  Place into both eyes nightly.     levothyroxine 88 MCG tablet  Commonly known as:  SYNTHROID  Take 88 mcg by mouth before breakfast.     liothyronine 5 MCG Tab  Commonly known as:  CYTOMEL  5 mcg once daily.     MAGNESIUM ORAL  Take 100 mg by mouth once daily.     metFORMIN 500 MG 24 hr tablet  Commonly known as:  GLUCOPHAGE-XR  500 mg 4 (four) times daily as needed.     nystatin-triamcinolone cream  Commonly known as:  MYCOLOG II  Apply to affected area 2 times daily     Ozempic 0.25 mg or 0.5 mg(2 mg/1.5 mL) Pnij  Generic drug:  semaglutide  once daily.     Pazeo 0.7 % Drop  Generic drug:  olopatadine  once daily.     pravastatin 40 MG tablet  Commonly known as:  PRAVACHOL  Take 1 tablet (40 mg total) by mouth once daily.     spironolactone 100 MG tablet  Commonly known as:  ALDACTONE  Take 50 mg by mouth once daily.     topiramate 25 MG tablet  Commonly known as:  TOPAMAX  25 mg once daily.     vitamin D 1000 units Tab  Commonly known as:  VITAMIN D3  Take 1,000 Units by mouth once daily.     Yuvafem 10 mcg Tab  Generic drug:  estradiol  once daily.     zolpidem 6.25 MG CR tablet  Commonly known as:  AMBIEN CR  6.25 mg nightly as needed.          Time spent on the discharge of patient: 2 minutes    Magnolia Godwin PA-C  General Surgery  [facility]

## 2019-12-02 NOTE — PLAN OF CARE
12/02/19 1137   Post-Acute Status   Post-Acute Authorization Other   Other Status No Post-Acute Service Needs   This SW in communication with  and medical team. SW will continue to follow for discharge needs and offer support as needed.    No SW needs identified at this time.    Corina Aleman LMSW  [facility]  [id number]

## 2019-12-02 NOTE — SUBJECTIVE & OBJECTIVE
Interval History:   Patient seen and examined, no acute events overnight  C/o abdominal soreness  Tolerated clears with no N/V  Afebrile/VSS    Medications:  Continuous Infusions:  Scheduled Meds:   aspirin  81 mg Oral Daily    buPROPion  150 mg Oral Daily    enoxaparin  40 mg Subcutaneous Daily    FLUoxetine  20 mg Oral Daily    levothyroxine  88 mcg Oral Before breakfast    liothyronine  5 mcg Oral Daily    spironolactone  50 mg Oral Daily    topiramate  25 mg Oral Daily     PRN Meds:acetaminophen, albuterol, Dextrose 10% Bolus, Dextrose 10% Bolus, glucagon (human recombinant), glucose, glucose, HYDROcodone-acetaminophen, HYDROcodone-acetaminophen, insulin aspart U-100, ondansetron, sodium chloride 0.9%     Review of patient's allergies indicates:   Allergen Reactions    Latex, natural rubber     Penicillins      Objective:     Vital Signs (Most Recent):  Temp: 97.7 °F (36.5 °C) (12/02/19 0452)  Pulse: 92 (12/02/19 0452)  Resp: 18 (12/02/19 0452)  BP: (!) 111/53 (12/02/19 0452)  SpO2: 96 % (12/02/19 0452) Vital Signs (24h Range):  Temp:  [97.6 °F (36.4 °C)-99.5 °F (37.5 °C)] 97.7 °F (36.5 °C)  Pulse:  [] 92  Resp:  [14-22] 18  SpO2:  [90 %-100 %] 96 %  BP: (111-174)/(53-75) 111/53     Weight: 79.7 kg (175 lb 9.5 oz)  Body mass index is 26.7 kg/m².    Intake/Output - Last 3 Shifts       11/30 0700 - 12/01 0659 12/01 0700 - 12/02 0659 12/02 0700 - 12/03 0659    P.O.  420     I.V. (mL/kg)  1000 (12.6)     IV Piggyback  1000     Total Intake(mL/kg)  2420 (30.4)     Urine (mL/kg/hr)  80     Blood  25     Total Output  105     Net  +2315            Urine Occurrence  4 x           Physical Exam   Constitutional: She appears well-developed and well-nourished. No distress.   HENT:   Head: Normocephalic and atraumatic.   Cardiovascular: Normal rate and regular rhythm.   Pulmonary/Chest: Effort normal. No respiratory distress.   Abdominal:   Soft, appropriate TTP, non distended  Dermabond in place - clean,  dry and intact        Significant Labs:  CBC:   Recent Labs   Lab 12/02/19 0419   WBC 14.58*   RBC 4.75   HGB 12.7   HCT 40.7      MCV 86   MCH 26.7*   MCHC 31.2*     BMP:   Recent Labs   Lab 12/02/19 0419         K 3.7      CO2 22*   BUN 11   CREATININE 0.6   CALCIUM 8.2*   MG 1.8     CMP:   Recent Labs   Lab 12/02/19 0419      CALCIUM 8.2*   ALBUMIN 2.8*   PROT 5.8*      K 3.7   CO2 22*      BUN 11   CREATININE 0.6   ALKPHOS 64   ALT 12   AST 11   BILITOT 1.0     LFTs:   Recent Labs   Lab 12/02/19 0419   ALT 12   AST 11   ALKPHOS 64   BILITOT 1.0   PROT 5.8*   ALBUMIN 2.8*

## 2019-12-02 NOTE — PLAN OF CARE
Pt resting in bed.  C/o some soreness at abdominal incisions.  Incisions with dermabond and no s/s of infection.  Pt able to urinate without difficulty.  IV to Left AC patent and secured.  Flushes well and is saline locked.  Tolerated clear liquid diet well for supper.  No N/V reported.  SCDs on.  Able to ambulate to restroom independantly.  Oriented pt to room and use of call light and bed.  Verbalizes understanding.  Call light within reach.  Bed locked and lowered for safety.

## 2019-12-02 NOTE — PLAN OF CARE
12/02/19 1313   Final Note   Assessment Type Final Discharge Note   Anticipated Discharge Disposition Home   What phone number can be called within the next 1-3 days to see how you are doing after discharge?   ([phone])   Hospital Follow Up  Appt(s) scheduled? Yes   Discharge plans and expectations educations in teach back method with documentation complete? Yes   Right Care Referral Info   Post Acute Recommendation No Care

## 2019-12-02 NOTE — NURSING TRANSFER
Nursing Transfer Note      12/1/2019     Transfer To: 507    Transfer via bed    Transfer with 2L NC     Transported by RN X 2    Medicines sent: none    Chart send with patient: Yes    Notified: daughter via text messaging system    Patient reassessed at: 12/1/2019 1289

## 2019-12-02 NOTE — NURSING
Pt and daughter given AVS, IV removed, Norco delivered to bedside, no questions, will be wheeled down in wheelchair.

## 2019-12-02 NOTE — PLAN OF CARE
Patient lives alone in a 1 story house/Double, w/5 [address], w/railings. Her daughter lives next door. Daughter is at her BS. Patient is independent & agile. No needs are determined.     Patient does not like the PCP she saw;she declined to give name. CM offered to schedule w/O PCP, informing her Appt. may be >3/2020;She agreed & states she has an [facility]. Informed her CM will arrange, w/Office to mail an Appt letter to her.     Patient declined [facility] Planning Packet.       12/02/19 1235   Discharge Assessment   Assessment Type Discharge Planning Assessment   Confirmed/corrected address and phone number on facesheet? Yes   Assessment information obtained from? Patient;Medical Record   Expected Length of Stay (days)   (1)   Communicated expected length of stay with patient/caregiver yes   Prior to hospitilization cognitive status: Alert/Oriented;No Deficits   Prior to hospitalization functional status: Independent   Current cognitive status: Alert/Oriented;No Deficits   Current Functional Status: Independent   Facility Arrived From:   (N/A)   Lives With alone   Able to Return to Prior Arrangements yes   Is patient able to care for self after discharge? Yes   Who are your caregiver(s) and their phone number(s)?   (Chioma Mejia Daughter     [phone]   )   Patient's perception of discharge disposition home or selfcare   Readmission Within the Last 30 Days no previous admission in last 30 days   Patient currently being followed by outpatient case management? No   Patient currently receives any other outside agency services? No   Equipment Currently Used at Home none   Do you have any problems affording any of your prescribed medications? No   Is the patient taking medications as prescribed? yes   Does the patient have transportation home? Yes   Transportation Anticipated family or friend will provide   Dialysis Name and Scheduled days   (N/A)   [facility]? No   Discharge Plan A Home with family   Discharge Plan B Home with family   DME Needed Upon Discharge  none   Patient/Family in Agreement with Plan yes

## 2019-12-02 NOTE — NURSING
Patient return from PACU post -op appendectomy via bed. Alert oriented x3 O2 at 2 liter per Nc. Noted to abdomen 3 lap sites with  dermabond . No noted bleeding daughter at bedside.

## 2019-12-02 NOTE — ASSESSMENT & PLAN NOTE
75 y/o F with acute appendicitis, s/p lap appy 12/1    Regular diet  Prn nausea/pain control   DVT prophylaxis  Plan for dc today pending toleration of diet

## 2019-12-03 ENCOUNTER — TELEPHONE (OUTPATIENT)
Dept: SURGERY | Facility: CLINIC | Age: 74
End: 2019-12-03

## 2019-12-03 RX ORDER — ONDANSETRON HYDROCHLORIDE 8 MG/1
8 TABLET, FILM COATED ORAL EVERY 8 HOURS PRN
COMMUNITY
End: 2021-02-23

## 2019-12-16 ENCOUNTER — OFFICE VISIT (OUTPATIENT)
Dept: SURGERY | Facility: CLINIC | Age: 74
End: 2019-12-16
Payer: MEDICARE

## 2019-12-16 VITALS
WEIGHT: 175.38 LBS | BODY MASS INDEX: 26.58 KG/M2 | HEIGHT: 68 IN | HEART RATE: 75 BPM | SYSTOLIC BLOOD PRESSURE: 138 MMHG | TEMPERATURE: 98 F | DIASTOLIC BLOOD PRESSURE: 63 MMHG

## 2019-12-16 DIAGNOSIS — K35.30 ACUTE APPENDICITIS WITH LOCALIZED PERITONITIS, WITHOUT PERFORATION, ABSCESS, OR GANGRENE: Primary | ICD-10-CM

## 2019-12-16 PROCEDURE — 99024 POSTOP FOLLOW-UP VISIT: CPT | Mod: POP,,, | Performed by: SURGERY

## 2019-12-16 PROCEDURE — 99999 PR PBB SHADOW E&M-EST. PATIENT-LVL III: ICD-10-PCS | Mod: PBBFAC,,, | Performed by: SURGERY

## 2019-12-16 PROCEDURE — 99024 PR POST-OP FOLLOW-UP VISIT: ICD-10-PCS | Mod: POP,,, | Performed by: SURGERY

## 2019-12-16 PROCEDURE — 99999 PR PBB SHADOW E&M-EST. PATIENT-LVL III: CPT | Mod: PBBFAC,,, | Performed by: SURGERY

## 2019-12-16 PROCEDURE — 99213 OFFICE O/P EST LOW 20 MIN: CPT | Mod: PBBFAC | Performed by: SURGERY

## 2019-12-16 NOTE — PROGRESS NOTES
" Clinic Follow-up  General Surgery    Date: 12/16/2019  Interval: Juan Antonio Mejia is a 74 y.o. female seen today in follow up s/p lap appy for acute non perforated appendicitis 12/01/19      SUBJECTIVE:       Doing great after surgery. No nausea/vomiting, tolerating a diet. Having Bms. No fevers.     OBJECTIVE:     Vital Signs (Most Recent)  Vitals:    12/16/19 1304   BP: 138/63   Pulse: 75   Temp: 98.1 °F (36.7 °C)   TempSrc: Oral   Weight: 79.5 kg (175 lb 6 oz)   Height: 5' 8" (1.727 m)       Physical Exam:  General: well developed, well nourished, no distress  Lungs:  clear to auscultation bilaterally and normal respiratory effort  Heart: regular rate and rhythm, S1, S2 normal, no murmur, rub or gallop  Abdomen: soft, non-tender non-distented; bowel sounds normal; no masses,  no organomegaly   Lap incisions are intact.     Wound/Incision:  clean, dry, intact    Pathology: Pending      ASSESSMENT/PLAN:     73 yo female with hx of lap appy 12/01 for acute non perforated appendicitis     Doing great  Wait 2 more weeks before release from lifting restrictions   Will call with path. Still pending   RTC PRN     Enrique Estrella, PGY-5  General Surgery  [phone]    "

## 2019-12-17 ENCOUNTER — OFFICE VISIT (OUTPATIENT)
Dept: VASCULAR SURGERY | Facility: CLINIC | Age: 74
End: 2019-12-17
Attending: SURGERY
Payer: MEDICARE

## 2019-12-17 ENCOUNTER — HOSPITAL ENCOUNTER (OUTPATIENT)
Dept: VASCULAR SURGERY | Facility: CLINIC | Age: 74
Discharge: HOME | End: 2019-12-17
Attending: SURGERY
Payer: MEDICARE

## 2019-12-17 VITALS
TEMPERATURE: 100 F | HEIGHT: 68 IN | DIASTOLIC BLOOD PRESSURE: 66 MMHG | BODY MASS INDEX: 26.73 KG/M2 | WEIGHT: 176.38 LBS | SYSTOLIC BLOOD PRESSURE: 153 MMHG | HEART RATE: 72 BPM

## 2019-12-17 DIAGNOSIS — I65.23 BILATERAL CAROTID ARTERY STENOSIS: Primary | ICD-10-CM

## 2019-12-17 DIAGNOSIS — I65.23 BILATERAL CAROTID ARTERY STENOSIS: ICD-10-CM

## 2019-12-17 PROCEDURE — 1159F PR MEDICATION LIST DOCUMENTED IN MEDICAL RECORD: ICD-10-PCS | Mod: ,,, | Performed by: SURGERY

## 2019-12-17 PROCEDURE — 99999 PR PBB SHADOW E&M-EST. PATIENT-LVL III: CPT | Mod: PBBFAC,,, | Performed by: SURGERY

## 2019-12-17 PROCEDURE — 99214 PR OFFICE/OUTPT VISIT, EST, LEVL IV, 30-39 MIN: ICD-10-PCS | Mod: S$PBB,,, | Performed by: SURGERY

## 2019-12-17 PROCEDURE — 99214 OFFICE O/P EST MOD 30 MIN: CPT | Mod: S$PBB,,, | Performed by: SURGERY

## 2019-12-17 PROCEDURE — 93880 PR DUPLEX SCAN EXTRACRANIAL,BILAT: ICD-10-PCS | Mod: 26,S$PBB,, | Performed by: SURGERY

## 2019-12-17 PROCEDURE — 1159F MED LIST DOCD IN RCRD: CPT | Mod: ,,, | Performed by: SURGERY

## 2019-12-17 PROCEDURE — 1126F PR PAIN SEVERITY QUANTIFIED, NO PAIN PRESENT: ICD-10-PCS | Mod: ,,, | Performed by: SURGERY

## 2019-12-17 PROCEDURE — 93880 EXTRACRANIAL BILAT STUDY: CPT | Mod: PBBFAC | Performed by: SURGERY

## 2019-12-17 PROCEDURE — 99999 PR PBB SHADOW E&M-EST. PATIENT-LVL III: ICD-10-PCS | Mod: PBBFAC,,, | Performed by: SURGERY

## 2019-12-17 PROCEDURE — 93880 EXTRACRANIAL BILAT STUDY: CPT | Mod: 26,S$PBB,, | Performed by: SURGERY

## 2019-12-17 PROCEDURE — 99213 OFFICE O/P EST LOW 20 MIN: CPT | Mod: PBBFAC,25 | Performed by: SURGERY

## 2019-12-17 PROCEDURE — 1126F AMNT PAIN NOTED NONE PRSNT: CPT | Mod: ,,, | Performed by: SURGERY

## 2019-12-17 RX ORDER — HYDROCORTISONE 25 MG/G
CREAM TOPICAL
COMMUNITY
Start: 2019-10-14 | End: 2021-12-15

## 2019-12-17 NOTE — PROGRESS NOTES
REFERRING PHYSICIAN:  Dr. Sergio Leal (vascular surgeon, [place]).    HISTORY OF PRESENT ILLNESS:  A 74-year-old female recently relocated from   [place] to [place], sent for evaluation of carotid artery disease.  She has   no history of stroke, TIA or amaurosis.  Recent CT scan done for other purposes   incidentally showed significant left common carotid artery disease, per history   stenotic versus occluded (I do not have these images).    She takes aspirin only intermittently.  She is on statin; however, her most   recent LDL is 109.    This is a 6 month follow-up.  She denies interval stroke TIA or amaurosis.  She just underwent laparoscopic appendectomy approximately 2 weeks ago    PAST MEDICAL HISTORY:  1.  Extramammary Paget's disease.  2.  Diabetes.    Past surgical history, appendectomy 12/20/2019    FAMILY HISTORY:  Positive for colon cancer.    SOCIAL HISTORY:  She is a nonsmoker.    MEDICATIONS:  Include aspirin taken three to four times a day 81 mg and Zetia.    See EPIC for full list.    ALLERGIES:  None.    REVIEW OF SYSTEMS:  Denies postprandial pain or DVT.  All other systems   including eyes, ENT, respiratory, musculoskeletal, psychiatric, heme, lymph,   allergy and immune are negative.    PHYSICAL EXAMINATION:  VITAL SIGNS:  See nursing notes.  GENERAL:  She is in no acute distress.  RESPIRATORY:  Normal effort.  Clear to auscultation.  CARDIAC:  Regular rate and rhythm, nondisplaced PMI, no murmur.  VASCULAR:  2+ radial and brachial pulses.  ABDOMEN:  No masses or tenderness.  No hepatosplenomegaly.  Aorta cannot be   palpated.  EYES:  Normal conjunctivae and lids.  ENT:  Fair dentition.  NECK:  No JVD or thyromegaly.  MUSCULOSKELETAL:  No kyphosis or scoliosis.  EXTREMITIES:  Without clubbing or cyanosis.  SKIN:  Warm and dry.  NEUROLOGIC:  Alert and oriented x3.  Normal mood and affect.  Midline tongue.    No speech difficulty or hoarseness.  5/5 motor strength in all extremities.   Stable    IMAGING:  Right 40-59% carotid stenosis.  Left common carotid artery now appears occluded, with retrograde flow from the external to the internal carotid artery.  ICA velocities on the left are now much lower given the collateral inflow maximum 37 centimeters/second.    Prior Carotid duplex shows minimal right carotid stenosis with peak systolic   velocity of 130 and diastolic of 30.  On the left there is a 60% to 70% ICA   stenosis with peak systolic velocity of 183 and diastolic of only 38, ratio of   3.7.  By duplex, there appears to be high grade stenosis versus occlusion of the   left proximal common carotid artery.  Collateralization is noted.    ASSESSMENT:  Left common carotid artery occlusion, asymptomatic    RECOMMENDATIONS:  1.  Continue current medical treatment  2.  Follow-up in 1 year with repeat carotid duplex    QUINTON Williamson III, MD, FACS  Professor and Chief, Vascular and Endovascular Surgery    CC: Sergio Leal

## 2019-12-27 LAB
FINAL PATHOLOGIC DIAGNOSIS: NORMAL
GROSS: NORMAL

## 2020-01-15 ENCOUNTER — OFFICE VISIT (OUTPATIENT)
Dept: OBSTETRICS AND GYNECOLOGY | Facility: CLINIC | Age: 75
End: 2020-01-15
Payer: MEDICARE

## 2020-01-15 VITALS
BODY MASS INDEX: 26.62 KG/M2 | DIASTOLIC BLOOD PRESSURE: 64 MMHG | SYSTOLIC BLOOD PRESSURE: 138 MMHG | HEIGHT: 68 IN | WEIGHT: 175.63 LBS

## 2020-01-15 DIAGNOSIS — Z12.31 ENCOUNTER FOR SCREENING MAMMOGRAM FOR MALIGNANT NEOPLASM OF BREAST: ICD-10-CM

## 2020-01-15 DIAGNOSIS — Z12.39 ENCOUNTER FOR SCREENING BREAST EXAMINATION: Primary | ICD-10-CM

## 2020-01-15 PROCEDURE — G0101 PR CA SCREEN;PELVIC/BREAST EXAM: ICD-10-PCS | Mod: S$GLB,,, | Performed by: OBSTETRICS & GYNECOLOGY

## 2020-01-15 PROCEDURE — G0101 CA SCREEN;PELVIC/BREAST EXAM: HCPCS | Mod: S$GLB,,, | Performed by: OBSTETRICS & GYNECOLOGY

## 2020-01-15 NOTE — PROGRESS NOTES
CC: 73 yo here for WWE    HPI: Christiano is overall well today.  She has a history of extra mammary pagets disease and is followed by an oncologist in [place]. She would like to have a gynecologist in [place] in the event that she needs something, but she is primarily managed in [place]. She is up to date on other screening including mammogram, colonoscopy, etc. Also needs a picture for her physician in [facility] ([facility]). She is currently being treated with aldara and overall having a good response.     She also has recurrent yeast infections (both prior visits). Today is overall asymptomatic. She is now a longer course of aldara per recommendation by her physician in [place], Dr. Tapia who is a specialist in pagets disease of the vulva.     Had DEXA scan within the last 15 years that was normal. Does not want repeat as she would not do any treatment for osteoperosis. She does not do weight bearing exercises.    Seeing Dr. Alvarez for routine health maintenance.     Interval history: Here for WWE. No longer using aldara for Pagets. Doctor in [place] was okay with her not continuing unless something changes. She is still seeing him every 6 months. No unusual GYN complaints today. She had an emergency appendectomy over . Switching PCP, now going to [facility] provider in March. Due for mammogram in march, last was at [facility]. Colonscopy due and she has a doctor for that, just delayed due to appendectomy. No longer needing diflucan.     Past Medical History:   Diagnosis Date    Depression     Diabetes     metformin and diet controlled    HLD (hyperlipidemia)     Mild intermittent asthma     Paget disease, extra mammary     affecting vulva       Past Surgical History:   Procedure Laterality Date     SECTION      LAPAROSCOPIC APPENDECTOMY N/A 2019    Procedure: APPENDECTOMY, LAPAROSCOPIC;  Surgeon: Agustín Flores MD;  Location: [facility] OR [address] [place] [address];  Service: General;  Laterality: N/A;       OB  History    None         Current Outpatient Medications on File Prior to Visit   Medication Sig Dispense Refill    albuterol (VENTOLIN HFA) 90 mcg/actuation inhaler Inhale 1 puff into the lungs every 6 (six) hours as needed for Wheezing or Shortness of Breath. Rescue 16 g 5    aspirin (ECOTRIN) 81 MG EC tablet Take 81 mg by mouth once daily.       BELVIQ 10 mg Tab once daily.       BEPREVE 1.5 % Drop       biotin 10,000 mcg Cap Take by mouth once daily.      buPROPion (WELLBUTRIN XL) 150 MG TB24 tablet once daily.       ezetimibe (ZETIA) 10 mg tablet once daily.       fluconazole (DIFLUCAN) 150 MG Tab Take 1 tablet (150 mg total) by mouth every 72 hours as needed. 2 tablet 0    FLUoxetine 20 MG capsule 20 mg once daily.       glucosamine HCl (GLUCOSAMINE, BULK, MISC) 3,000 mg by Misc.(Non-Drug; Combo Route) route once daily.      HYDROcodone-acetaminophen (NORCO) 5-325 mg per tablet Take 1 tablet by mouth every 6 (six) hours as needed for Pain (Do not drive while taking this medication.). 15 tablet 0    hydrocortisone 2.5 % cream       imiquimod (ALDARA) 5 % cream once daily.       INVOKANA 300 mg Tab tablet 300 mg once daily.       LATISSE 0.03 % ophthalmic solution Place into both eyes nightly.       levothyroxine (SYNTHROID) 88 MCG tablet Take 88 mcg by mouth before breakfast.       liothyronine (CYTOMEL) 5 MCG Tab 5 mcg once daily.       MAGNESIUM ORAL Take 100 mg by mouth once daily.      metFORMIN (GLUCOPHAGE-XR) 500 MG 24 hr tablet 500 mg 4 (four) times daily as needed.       nystatin-triamcinolone (MYCOLOG II) cream Apply to affected area 2 times daily 30 g 1    ondansetron (ZOFRAN) 8 MG tablet Take 8 mg by mouth every 8 (eight) hours as needed for Nausea.      OZEMPIC 0.25 mg or 0.5 mg(2 mg/1.5 mL) PnIj once daily.       PAZEO 0.7 % Drop once daily.       pravastatin (PRAVACHOL) 40 MG tablet Take 1 tablet (40 mg total) by mouth once daily. 90 tablet 3    spironolactone (ALDACTONE)  "100 MG tablet Take 50 mg by mouth once daily.       topiramate (TOPAMAX) 25 MG tablet 25 mg once daily.       vitamin D (VITAMIN D3) 1000 units Tab Take 1,000 Units by mouth once daily.      YUVAFEM 10 mcg Tab once daily.       zolpidem (AMBIEN CR) 6.25 MG CR tablet 6.25 mg nightly as needed.        No current facility-administered medications on file prior to visit.        ROS:  GENERAL: Denies weight gain or weight loss. Feeling well overall.   SKIN: See HPI  HEAD: Denies head injury or headache.   CHEST: Denies chest pain or shortness of breath.   CARDIOVASCULAR: Denies palpitations or left sided chest pain.   ABDOMEN: No abdominal pain   REPRODUCTIVE: See HPI.   HEMATOLOGIC: No easy bruisability or excessive bleeding.     Physical Exam:   /64   Ht 5' 8" (1.727 m)   Wt 79.6 kg (175 lb 9.5 oz)   BMI 26.70 kg/m²   General: No distress, well appearing  HEENT: normocephalic, atraumatic   Breast: Symmetric, no masses or lesions, no lymphadenopathy, no nipple discharge or skin changes  Heart: Regular rate  Lungs: No increased work of breathing  MS: lower extremeties symmetrical, no edema  Pelvic Exam:     GENITALIA: There is diffuse erythema of the labia minora with overlying white patchy areas consistent with pagets disease of the vulva, but significantly improved from prior exam. 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 Currently being managed by gyn oncology in [place]. Here for WWE    1. Offered referral to gyn oncology here at [facility]. She would like to continue vulvar care with her gyn oncologist in [place], Dr. Tapia. She is off of [facility]. Seeing Dr. Tapia every 6 months.   2. Mammogram due in 03/2020, ordered and scheduled  3. No further pap smear screening indicated after 65  4. " Colonoscopy due, managed by other physician  5. Routine health maintenance per PCP  7. Previously being seen by Payal Valente and will send records    See me in 6 months.     Anne Brown MD  Obstetrics and Gynecology  [facility]

## 2020-03-11 ENCOUNTER — PATIENT MESSAGE (OUTPATIENT)
Dept: INTERNAL MEDICINE | Facility: CLINIC | Age: 75
End: 2020-03-11

## 2020-03-11 DIAGNOSIS — J45.20 MILD INTERMITTENT ASTHMA WITHOUT COMPLICATION: Primary | ICD-10-CM

## 2020-03-13 RX ORDER — MONTELUKAST SODIUM 10 MG/1
10 TABLET ORAL NIGHTLY
Qty: 30 TABLET | Refills: 1 | Status: SHIPPED | OUTPATIENT
Start: 2020-03-13 | End: 2021-02-23

## 2020-04-01 ENCOUNTER — TELEPHONE (OUTPATIENT)
Dept: RADIOLOGY | Facility: HOSPITAL | Age: 75
End: 2020-04-01

## 2020-04-06 ENCOUNTER — PATIENT MESSAGE (OUTPATIENT)
Dept: INTERNAL MEDICINE | Facility: CLINIC | Age: 75
End: 2020-04-06

## 2020-04-06 DIAGNOSIS — J45.20 MILD INTERMITTENT ASTHMA WITHOUT COMPLICATION: ICD-10-CM

## 2020-04-06 RX ORDER — ALBUTEROL SULFATE 90 UG/1
1 AEROSOL, METERED RESPIRATORY (INHALATION) EVERY 6 HOURS PRN
Qty: 16 G | Refills: 11 | Status: SHIPPED | OUTPATIENT
Start: 2020-04-06 | End: 2022-11-30 | Stop reason: SDUPTHER

## 2020-04-06 NOTE — TELEPHONE ENCOUNTER
Clinic. Patient stated she needs a prescription for Advair 50/100. She stated her last rx for it was from a doctor in [place]. Please advise

## 2020-04-07 ENCOUNTER — PATIENT MESSAGE (OUTPATIENT)
Dept: INTERNAL MEDICINE | Facility: CLINIC | Age: 75
End: 2020-04-07

## 2020-04-09 ENCOUNTER — PATIENT MESSAGE (OUTPATIENT)
Dept: INTERNAL MEDICINE | Facility: CLINIC | Age: 75
End: 2020-04-09

## 2020-04-09 DIAGNOSIS — J45.20 MILD INTERMITTENT ASTHMA WITHOUT COMPLICATION: Primary | ICD-10-CM

## 2020-04-09 RX ORDER — FLUTICASONE PROPIONATE AND SALMETEROL 100; 50 UG/1; UG/1
1 POWDER RESPIRATORY (INHALATION) 2 TIMES DAILY
Qty: 180 EACH | Refills: 0 | Status: SHIPPED | OUTPATIENT
Start: 2020-04-09 | End: 2021-09-09 | Stop reason: SDUPTHER

## 2020-05-04 ENCOUNTER — PATIENT MESSAGE (OUTPATIENT)
Dept: OBSTETRICS AND GYNECOLOGY | Facility: CLINIC | Age: 75
End: 2020-05-04

## 2020-10-22 ENCOUNTER — TELEPHONE (OUTPATIENT)
Dept: VASCULAR SURGERY | Facility: CLINIC | Age: 75
End: 2020-10-22

## 2020-10-22 NOTE — TELEPHONE ENCOUNTER
Attempted to contact patient in response to message stating she would like to schedule her FU appt with Dr. Williamson. Voice message left for patient requesting return call.

## 2020-10-23 ENCOUNTER — TELEPHONE (OUTPATIENT)
Dept: VASCULAR SURGERY | Facility: CLINIC | Age: 75
End: 2020-10-23

## 2020-10-23 DIAGNOSIS — I65.23 BILATERAL CAROTID ARTERY STENOSIS: Primary | ICD-10-CM

## 2020-10-23 NOTE — TELEPHONE ENCOUNTER
Contacted patient in response to message. Appointment scheduled, pt verified. Appointment letter placed in mail.   ----- Message from Monica Bess sent at 10/23/2020 12:01 PM CDT -----  Pt is calling to schedule an appt and would like for the nurse to give her a call back

## 2020-11-19 ENCOUNTER — PATIENT MESSAGE (OUTPATIENT)
Dept: VASCULAR SURGERY | Facility: CLINIC | Age: 75
End: 2020-11-19

## 2020-11-19 ENCOUNTER — PATIENT MESSAGE (OUTPATIENT)
Dept: CARDIOLOGY | Facility: CLINIC | Age: 75
End: 2020-11-19

## 2020-11-28 ENCOUNTER — PATIENT MESSAGE (OUTPATIENT)
Dept: VASCULAR SURGERY | Facility: CLINIC | Age: 75
End: 2020-11-28

## 2020-12-01 ENCOUNTER — TELEPHONE (OUTPATIENT)
Dept: VASCULAR SURGERY | Facility: CLINIC | Age: 75
End: 2020-12-01

## 2020-12-01 NOTE — TELEPHONE ENCOUNTER
Contacted patient to verify whether or not she would like to cancel her appt with Dr. Williamson or if she would like nurse to reschedule. Pt states she thought that both appointments were cancelled already and that she would like to call herself to reschedule when she feels more comfortable coming into hospital. Notified patient this was fine and that she should call for any concerning symptoms. Pt verbalized understanding.

## 2021-01-10 ENCOUNTER — IMMUNIZATION (OUTPATIENT)
Dept: INTERNAL MEDICINE | Facility: CLINIC | Age: 76
End: 2021-01-10
Payer: MEDICARE

## 2021-01-10 DIAGNOSIS — Z23 NEED FOR VACCINATION: ICD-10-CM

## 2021-01-10 PROCEDURE — 91300 COVID-19, MRNA, LNP-S, PF, 30 MCG/0.3 ML DOSE VACCINE: CPT | Mod: PBBFAC

## 2021-01-31 ENCOUNTER — IMMUNIZATION (OUTPATIENT)
Dept: INTERNAL MEDICINE | Facility: CLINIC | Age: 76
End: 2021-01-31
Payer: MEDICARE

## 2021-01-31 DIAGNOSIS — Z23 NEED FOR VACCINATION: Primary | ICD-10-CM

## 2021-01-31 PROCEDURE — 0002A PR IMMUNIZ ADMIN, SARS-COV-2 COVID-19 VACC, 30MCG/0.3ML, 2ND DOSE: CPT | Mod: CV19,,, | Performed by: INTERNAL MEDICINE

## 2021-01-31 PROCEDURE — 91300 PR SARS-COV- 2 COVID-19 VACCINE, NO PRSV, 30MCG/0.3ML, IM: ICD-10-PCS | Mod: ,,, | Performed by: INTERNAL MEDICINE

## 2021-01-31 PROCEDURE — 0002A PR IMMUNIZ ADMIN, SARS-COV-2 COVID-19 VACC, 30MCG/0.3ML, 2ND DOSE: ICD-10-PCS | Mod: CV19,,, | Performed by: INTERNAL MEDICINE

## 2021-01-31 PROCEDURE — 91300 PR SARS-COV- 2 COVID-19 VACCINE, NO PRSV, 30MCG/0.3ML, IM: CPT | Mod: ,,, | Performed by: INTERNAL MEDICINE

## 2021-01-31 RX ADMIN — Medication 0.3 ML: at 11:01

## 2021-02-08 ENCOUNTER — TELEPHONE (OUTPATIENT)
Dept: VASCULAR SURGERY | Facility: CLINIC | Age: 76
End: 2021-02-08

## 2021-02-09 ENCOUNTER — PATIENT OUTREACH (OUTPATIENT)
Dept: ADMINISTRATIVE | Facility: HOSPITAL | Age: 76
End: 2021-02-09

## 2021-02-23 ENCOUNTER — OFFICE VISIT (OUTPATIENT)
Dept: INTERNAL MEDICINE | Facility: CLINIC | Age: 76
End: 2021-02-23
Payer: MEDICARE

## 2021-02-23 ENCOUNTER — TELEPHONE (OUTPATIENT)
Dept: INTERNAL MEDICINE | Facility: CLINIC | Age: 76
End: 2021-02-23

## 2021-02-23 VITALS
SYSTOLIC BLOOD PRESSURE: 120 MMHG | HEART RATE: 93 BPM | TEMPERATURE: 97 F | BODY MASS INDEX: 26.64 KG/M2 | DIASTOLIC BLOOD PRESSURE: 58 MMHG | HEIGHT: 67 IN | WEIGHT: 169.75 LBS | OXYGEN SATURATION: 96 %

## 2021-02-23 DIAGNOSIS — Z51.81 MEDICATION MONITORING ENCOUNTER: ICD-10-CM

## 2021-02-23 DIAGNOSIS — I65.23 BILATERAL CAROTID ARTERY STENOSIS: ICD-10-CM

## 2021-02-23 DIAGNOSIS — R60.9 FLUID RETENTION: ICD-10-CM

## 2021-02-23 DIAGNOSIS — Z12.31 VISIT FOR SCREENING MAMMOGRAM: ICD-10-CM

## 2021-02-23 DIAGNOSIS — K57.90 DIVERTICULOSIS: ICD-10-CM

## 2021-02-23 DIAGNOSIS — R73.03 PREDIABETES: Primary | ICD-10-CM

## 2021-02-23 DIAGNOSIS — J45.20 MILD INTERMITTENT ASTHMA WITHOUT COMPLICATION: ICD-10-CM

## 2021-02-23 DIAGNOSIS — Z80.0 FAMILY HISTORY OF COLON CANCER IN MOTHER: ICD-10-CM

## 2021-02-23 DIAGNOSIS — Z86.010 HISTORY OF COLON POLYPS: ICD-10-CM

## 2021-02-23 DIAGNOSIS — Z12.11 SCREEN FOR COLON CANCER: ICD-10-CM

## 2021-02-23 DIAGNOSIS — Z79.899 OTHER LONG TERM (CURRENT) DRUG THERAPY: ICD-10-CM

## 2021-02-23 DIAGNOSIS — E78.2 MIXED HYPERLIPIDEMIA: ICD-10-CM

## 2021-02-23 DIAGNOSIS — G47.00 INSOMNIA, UNSPECIFIED TYPE: ICD-10-CM

## 2021-02-23 DIAGNOSIS — C51.9 PAGET'S DISEASE OF VULVA: ICD-10-CM

## 2021-02-23 DIAGNOSIS — E03.4 HYPOTHYROIDISM DUE TO ACQUIRED ATROPHY OF THYROID: ICD-10-CM

## 2021-02-23 DIAGNOSIS — Z85.828 HISTORY OF NONMELANOMA SKIN CANCER: ICD-10-CM

## 2021-02-23 PROBLEM — I10 ESSENTIAL HYPERTENSION: Status: RESOLVED | Noted: 2019-09-11 | Resolved: 2021-02-23

## 2021-02-23 PROBLEM — Z86.0100 HISTORY OF COLON POLYPS: Status: ACTIVE | Noted: 2021-02-23

## 2021-02-23 PROBLEM — K35.80 ACUTE APPENDICITIS: Status: RESOLVED | Noted: 2019-12-01 | Resolved: 2021-02-23

## 2021-02-23 PROCEDURE — 99214 OFFICE O/P EST MOD 30 MIN: CPT | Mod: S$PBB,,, | Performed by: FAMILY MEDICINE

## 2021-02-23 PROCEDURE — 99215 OFFICE O/P EST HI 40 MIN: CPT | Mod: PBBFAC,PN | Performed by: FAMILY MEDICINE

## 2021-02-23 PROCEDURE — 99999 PR PBB SHADOW E&M-EST. PATIENT-LVL V: ICD-10-PCS | Mod: PBBFAC,,, | Performed by: FAMILY MEDICINE

## 2021-02-23 PROCEDURE — 99999 PR PBB SHADOW E&M-EST. PATIENT-LVL V: CPT | Mod: PBBFAC,,, | Performed by: FAMILY MEDICINE

## 2021-02-23 PROCEDURE — 99214 PR OFFICE/OUTPT VISIT, EST, LEVL IV, 30-39 MIN: ICD-10-PCS | Mod: S$PBB,,, | Performed by: FAMILY MEDICINE

## 2021-02-23 RX ORDER — ZOLPIDEM TARTRATE 6.25 MG/1
6.25 TABLET, FILM COATED, EXTENDED RELEASE ORAL NIGHTLY
Qty: 30 TABLET | Refills: 0
Start: 2021-02-23 | End: 2024-02-21

## 2021-02-24 ENCOUNTER — TELEPHONE (OUTPATIENT)
Dept: INTERNAL MEDICINE | Facility: CLINIC | Age: 76
End: 2021-02-24

## 2021-02-25 ENCOUNTER — TELEPHONE (OUTPATIENT)
Dept: INTERNAL MEDICINE | Facility: CLINIC | Age: 76
End: 2021-02-25

## 2021-02-25 DIAGNOSIS — Z12.31 ENCOUNTER FOR SCREENING MAMMOGRAM FOR MALIGNANT NEOPLASM OF BREAST: Primary | ICD-10-CM

## 2021-02-27 ENCOUNTER — PATIENT OUTREACH (OUTPATIENT)
Dept: ADMINISTRATIVE | Facility: OTHER | Age: 76
End: 2021-02-27

## 2021-03-01 ENCOUNTER — TELEPHONE (OUTPATIENT)
Dept: SURGERY | Facility: CLINIC | Age: 76
End: 2021-03-01

## 2021-03-02 ENCOUNTER — TELEPHONE (OUTPATIENT)
Dept: INTERNAL MEDICINE | Facility: CLINIC | Age: 76
End: 2021-03-02

## 2021-03-02 ENCOUNTER — PATIENT MESSAGE (OUTPATIENT)
Dept: INTERNAL MEDICINE | Facility: CLINIC | Age: 76
End: 2021-03-02

## 2021-03-02 DIAGNOSIS — Z12.31 ENCOUNTER FOR SCREENING MAMMOGRAM FOR MALIGNANT NEOPLASM OF BREAST: Primary | ICD-10-CM

## 2021-03-03 ENCOUNTER — TELEPHONE (OUTPATIENT)
Dept: GASTROENTEROLOGY | Facility: CLINIC | Age: 76
End: 2021-03-03

## 2021-03-03 DIAGNOSIS — Z12.11 SPECIAL SCREENING FOR MALIGNANT NEOPLASMS, COLON: Primary | ICD-10-CM

## 2021-03-03 DIAGNOSIS — Z01.818 PRE-OP TESTING: ICD-10-CM

## 2021-03-03 RX ORDER — SODIUM, POTASSIUM,MAG SULFATES 17.5-3.13G
1 SOLUTION, RECONSTITUTED, ORAL ORAL DAILY
Qty: 1 KIT | Refills: 0 | Status: SHIPPED | OUTPATIENT
Start: 2021-03-03 | End: 2021-03-05

## 2021-03-08 ENCOUNTER — TELEPHONE (OUTPATIENT)
Dept: VASCULAR SURGERY | Facility: CLINIC | Age: 76
End: 2021-03-08

## 2021-03-11 ENCOUNTER — PATIENT OUTREACH (OUTPATIENT)
Dept: ADMINISTRATIVE | Facility: HOSPITAL | Age: 76
End: 2021-03-11

## 2021-03-15 ENCOUNTER — TELEPHONE (OUTPATIENT)
Dept: INTERNAL MEDICINE | Facility: CLINIC | Age: 76
End: 2021-03-15

## 2021-03-18 ENCOUNTER — PATIENT MESSAGE (OUTPATIENT)
Dept: RESEARCH | Facility: HOSPITAL | Age: 76
End: 2021-03-18

## 2021-03-19 ENCOUNTER — TELEPHONE (OUTPATIENT)
Dept: INTERNAL MEDICINE | Facility: CLINIC | Age: 76
End: 2021-03-19

## 2021-03-19 DIAGNOSIS — E03.4 HYPOTHYROIDISM DUE TO ACQUIRED ATROPHY OF THYROID: Primary | ICD-10-CM

## 2021-03-23 ENCOUNTER — TELEPHONE (OUTPATIENT)
Dept: VASCULAR SURGERY | Facility: CLINIC | Age: 76
End: 2021-03-23

## 2021-03-23 ENCOUNTER — OFFICE VISIT (OUTPATIENT)
Dept: VASCULAR SURGERY | Facility: CLINIC | Age: 76
End: 2021-03-23
Payer: MEDICARE

## 2021-03-23 ENCOUNTER — HOSPITAL ENCOUNTER (OUTPATIENT)
Dept: VASCULAR SURGERY | Facility: CLINIC | Age: 76
Discharge: HOME | End: 2021-03-23
Attending: SURGERY
Payer: MEDICARE

## 2021-03-23 VITALS
DIASTOLIC BLOOD PRESSURE: 68 MMHG | HEART RATE: 90 BPM | BODY MASS INDEX: 25.39 KG/M2 | HEIGHT: 68 IN | SYSTOLIC BLOOD PRESSURE: 155 MMHG | TEMPERATURE: 99 F | WEIGHT: 167.56 LBS

## 2021-03-23 DIAGNOSIS — I65.23 BILATERAL CAROTID ARTERY STENOSIS: Primary | ICD-10-CM

## 2021-03-23 DIAGNOSIS — I65.23 BILATERAL CAROTID ARTERY STENOSIS: ICD-10-CM

## 2021-03-23 PROCEDURE — 99999 PR PBB SHADOW E&M-EST. PATIENT-LVL V: CPT | Mod: PBBFAC,,, | Performed by: SURGERY

## 2021-03-23 PROCEDURE — 99999 PR PBB SHADOW E&M-EST. PATIENT-LVL V: ICD-10-PCS | Mod: PBBFAC,,, | Performed by: SURGERY

## 2021-03-23 PROCEDURE — 93880 EXTRACRANIAL BILAT STUDY: CPT | Mod: 26,S$PBB,, | Performed by: SURGERY

## 2021-03-23 PROCEDURE — 93880 PR DUPLEX SCAN EXTRACRANIAL,BILAT: ICD-10-PCS | Mod: 26,S$PBB,, | Performed by: SURGERY

## 2021-03-23 PROCEDURE — 99214 PR OFFICE/OUTPT VISIT, EST, LEVL IV, 30-39 MIN: ICD-10-PCS | Mod: S$PBB,,, | Performed by: SURGERY

## 2021-03-23 PROCEDURE — 93880 EXTRACRANIAL BILAT STUDY: CPT | Mod: PBBFAC | Performed by: SURGERY

## 2021-03-23 PROCEDURE — 99214 OFFICE O/P EST MOD 30 MIN: CPT | Mod: S$PBB,,, | Performed by: SURGERY

## 2021-03-23 PROCEDURE — 99215 OFFICE O/P EST HI 40 MIN: CPT | Mod: PBBFAC | Performed by: SURGERY

## 2021-03-23 RX ORDER — LEVOTHYROXINE SODIUM 75 UG/1
75 TABLET ORAL
Qty: 30 TABLET | Refills: 3 | Status: SHIPPED | OUTPATIENT
Start: 2021-03-23

## 2021-03-26 ENCOUNTER — PATIENT MESSAGE (OUTPATIENT)
Dept: RESEARCH | Facility: HOSPITAL | Age: 76
End: 2021-03-26

## 2021-03-29 ENCOUNTER — HOSPITAL ENCOUNTER (OUTPATIENT)
Dept: PREADMISSION TESTING | Facility: OTHER | Age: 76
Discharge: HOME | End: 2021-03-29
Attending: ANESTHESIOLOGY
Payer: MEDICARE

## 2021-03-29 DIAGNOSIS — Z01.818 PRE-OP TESTING: ICD-10-CM

## 2021-03-29 PROCEDURE — U0003 INFECTIOUS AGENT DETECTION BY NUCLEIC ACID (DNA OR RNA); SEVERE ACUTE RESPIRATORY SYNDROME CORONAVIRUS 2 (SARS-COV-2) (CORONAVIRUS DISEASE [COVID-19]), AMPLIFIED PROBE TECHNIQUE, MAKING USE OF HIGH THROUGHPUT TECHNOLOGIES AS DESCRIBED BY CMS-2020-01-R: HCPCS | Performed by: FAMILY MEDICINE

## 2021-03-29 PROCEDURE — U0005 INFEC AGEN DETEC AMPLI PROBE: HCPCS | Performed by: FAMILY MEDICINE

## 2021-03-30 ENCOUNTER — TELEPHONE (OUTPATIENT)
Dept: INTERNAL MEDICINE | Facility: CLINIC | Age: 76
End: 2021-03-30

## 2021-03-30 ENCOUNTER — PATIENT MESSAGE (OUTPATIENT)
Dept: OBSTETRICS AND GYNECOLOGY | Facility: CLINIC | Age: 76
End: 2021-03-30

## 2021-03-30 ENCOUNTER — OFFICE VISIT (OUTPATIENT)
Dept: OBSTETRICS AND GYNECOLOGY | Facility: CLINIC | Age: 76
End: 2021-03-30
Payer: MEDICARE

## 2021-03-30 ENCOUNTER — PATIENT OUTREACH (OUTPATIENT)
Dept: ADMINISTRATIVE | Facility: OTHER | Age: 76
End: 2021-03-30

## 2021-03-30 VITALS — HEIGHT: 68 IN | WEIGHT: 167.56 LBS | BODY MASS INDEX: 25.39 KG/M2

## 2021-03-30 DIAGNOSIS — E78.2 MIXED HYPERLIPIDEMIA: Primary | ICD-10-CM

## 2021-03-30 LAB
BACTERIA #/AREA URNS AUTO: NORMAL /HPF
BILIRUB UR QL STRIP: NEGATIVE
CLARITY UR REFRACT.AUTO: ABNORMAL
COLOR UR AUTO: YELLOW
GLUCOSE UR QL STRIP: ABNORMAL
HGB UR QL STRIP: NEGATIVE
HYALINE CASTS UR QL AUTO: 0 /LPF
KETONES UR QL STRIP: ABNORMAL
LEUKOCYTE ESTERASE UR QL STRIP: ABNORMAL
MICROSCOPIC COMMENT: NORMAL
NITRITE UR QL STRIP: NEGATIVE
PH UR STRIP: 5 [PH] (ref 5–8)
PROT UR QL STRIP: ABNORMAL
RBC #/AREA URNS AUTO: 1 /HPF (ref 0–4)
SARS-COV-2 RNA RESP QL NAA+PROBE: NOT DETECTED
SP GR UR STRIP: 1.03 (ref 1–1.03)
SQUAMOUS #/AREA URNS AUTO: 1 /HPF
URN SPEC COLLECT METH UR: ABNORMAL
WBC #/AREA URNS AUTO: 4 /HPF (ref 0–5)
YEAST UR QL AUTO: NORMAL

## 2021-03-30 PROCEDURE — 99214 OFFICE O/P EST MOD 30 MIN: CPT | Mod: PBBFAC,25 | Performed by: OBSTETRICS & GYNECOLOGY

## 2021-03-30 PROCEDURE — G0101 CA SCREEN;PELVIC/BREAST EXAM: HCPCS | Mod: PBBFAC

## 2021-03-30 PROCEDURE — 99999 PR PBB SHADOW E&M-EST. PATIENT-LVL IV: ICD-10-PCS | Mod: PBBFAC,,, | Performed by: OBSTETRICS & GYNECOLOGY

## 2021-03-30 PROCEDURE — 99999 PR PBB SHADOW E&M-EST. PATIENT-LVL IV: CPT | Mod: PBBFAC,,, | Performed by: OBSTETRICS & GYNECOLOGY

## 2021-03-30 PROCEDURE — 81001 URINALYSIS AUTO W/SCOPE: CPT | Performed by: OBSTETRICS & GYNECOLOGY

## 2021-03-30 PROCEDURE — G0101 PR CA SCREEN;PELVIC/BREAST EXAM: ICD-10-PCS | Mod: S$PBB,,, | Performed by: OBSTETRICS & GYNECOLOGY

## 2021-03-30 PROCEDURE — G0101 CA SCREEN;PELVIC/BREAST EXAM: HCPCS | Mod: S$PBB,,, | Performed by: OBSTETRICS & GYNECOLOGY

## 2021-04-01 ENCOUNTER — ANESTHESIA EVENT (OUTPATIENT)
Dept: ENDOSCOPY | Facility: HOSPITAL | Age: 76
End: 2021-04-01
Payer: MEDICARE

## 2021-04-01 ENCOUNTER — ANESTHESIA (OUTPATIENT)
Dept: ENDOSCOPY | Facility: HOSPITAL | Age: 76
End: 2021-04-01
Payer: MEDICARE

## 2021-04-01 ENCOUNTER — PATIENT OUTREACH (OUTPATIENT)
Dept: ADMINISTRATIVE | Facility: HOSPITAL | Age: 76
End: 2021-04-01

## 2021-04-01 ENCOUNTER — HOSPITAL ENCOUNTER (OUTPATIENT)
Facility: HOSPITAL | Age: 76
Discharge: HOME | End: 2021-04-01
Attending: INTERNAL MEDICINE | Admitting: INTERNAL MEDICINE
Payer: MEDICARE

## 2021-04-01 VITALS
DIASTOLIC BLOOD PRESSURE: 67 MMHG | OXYGEN SATURATION: 99 % | SYSTOLIC BLOOD PRESSURE: 149 MMHG | WEIGHT: 167 LBS | BODY MASS INDEX: 25.31 KG/M2 | HEART RATE: 85 BPM | TEMPERATURE: 98 F | HEIGHT: 68 IN | RESPIRATION RATE: 16 BRPM

## 2021-04-01 DIAGNOSIS — Z86.010 HISTORY OF COLON POLYPS: ICD-10-CM

## 2021-04-01 PROCEDURE — E9220 PRA ENDO ANESTHESIA: HCPCS | Mod: PT,,, | Performed by: NURSE ANESTHETIST, CERTIFIED REGISTERED

## 2021-04-01 PROCEDURE — 25000003 PHARM REV CODE 250: Performed by: INTERNAL MEDICINE

## 2021-04-01 PROCEDURE — 88305 TISSUE EXAM BY PATHOLOGIST: ICD-10-PCS | Mod: 26,,, | Performed by: PATHOLOGY

## 2021-04-01 PROCEDURE — 37000008 HC ANESTHESIA 1ST 15 MINUTES: Performed by: INTERNAL MEDICINE

## 2021-04-01 PROCEDURE — 45385 COLONOSCOPY W/LESION REMOVAL: CPT | Mod: PT,,, | Performed by: INTERNAL MEDICINE

## 2021-04-01 PROCEDURE — 27201089 HC SNARE, DISP (ANY): Performed by: INTERNAL MEDICINE

## 2021-04-01 PROCEDURE — 63600175 PHARM REV CODE 636 W HCPCS: Performed by: NURSE ANESTHETIST, CERTIFIED REGISTERED

## 2021-04-01 PROCEDURE — 88305 TISSUE EXAM BY PATHOLOGIST: CPT | Mod: 26,,, | Performed by: PATHOLOGY

## 2021-04-01 PROCEDURE — 45385 COLONOSCOPY W/LESION REMOVAL: CPT | Performed by: INTERNAL MEDICINE

## 2021-04-01 PROCEDURE — E9220 PRA ENDO ANESTHESIA: ICD-10-PCS | Mod: PT,,, | Performed by: NURSE ANESTHETIST, CERTIFIED REGISTERED

## 2021-04-01 PROCEDURE — 45385 PR COLONOSCOPY,REMV LESN,SNARE: ICD-10-PCS | Mod: PT,,, | Performed by: INTERNAL MEDICINE

## 2021-04-01 PROCEDURE — 88305 TISSUE EXAM BY PATHOLOGIST: CPT | Performed by: PATHOLOGY

## 2021-04-01 PROCEDURE — 37000009 HC ANESTHESIA EA ADD 15 MINS: Performed by: INTERNAL MEDICINE

## 2021-04-01 RX ORDER — LIDOCAINE HCL/PF 100 MG/5ML
SYRINGE (ML) INTRAVENOUS
Status: DISCONTINUED | OUTPATIENT
Start: 2021-04-01 | End: 2021-04-01

## 2021-04-01 RX ORDER — PROPOFOL 10 MG/ML
VIAL (ML) INTRAVENOUS CONTINUOUS PRN
Status: DISCONTINUED | OUTPATIENT
Start: 2021-04-01 | End: 2021-04-01

## 2021-04-01 RX ORDER — SODIUM CHLORIDE 9 MG/ML
INJECTION, SOLUTION INTRAVENOUS CONTINUOUS
Status: DISCONTINUED | OUTPATIENT
Start: 2021-04-01 | End: 2021-04-01 | Stop reason: HOSPADM

## 2021-04-01 RX ORDER — PROPOFOL 10 MG/ML
VIAL (ML) INTRAVENOUS
Status: DISCONTINUED | OUTPATIENT
Start: 2021-04-01 | End: 2021-04-01

## 2021-04-01 RX ADMIN — Medication 100 MG: at 02:04

## 2021-04-01 RX ADMIN — PROPOFOL 70 MG: 10 INJECTION, EMULSION INTRAVENOUS at 02:04

## 2021-04-01 RX ADMIN — SODIUM CHLORIDE: 0.9 INJECTION, SOLUTION INTRAVENOUS at 01:04

## 2021-04-01 RX ADMIN — PROPOFOL 125 MCG/KG/MIN: 10 INJECTION, EMULSION INTRAVENOUS at 02:04

## 2021-04-05 ENCOUNTER — TELEPHONE (OUTPATIENT)
Dept: INTERNAL MEDICINE | Facility: CLINIC | Age: 76
End: 2021-04-05

## 2021-04-09 ENCOUNTER — TELEPHONE (OUTPATIENT)
Dept: CARDIOLOGY | Facility: CLINIC | Age: 76
End: 2021-04-09

## 2021-04-09 ENCOUNTER — PATIENT MESSAGE (OUTPATIENT)
Dept: INTERNAL MEDICINE | Facility: CLINIC | Age: 76
End: 2021-04-09

## 2021-04-09 LAB
FINAL PATHOLOGIC DIAGNOSIS: NORMAL
GROSS: NORMAL
Lab: NORMAL

## 2021-04-13 ENCOUNTER — OFFICE VISIT (OUTPATIENT)
Dept: CARDIOLOGY | Facility: CLINIC | Age: 76
End: 2021-04-13
Payer: MEDICARE

## 2021-04-13 ENCOUNTER — PATIENT MESSAGE (OUTPATIENT)
Dept: OBSTETRICS AND GYNECOLOGY | Facility: CLINIC | Age: 76
End: 2021-04-13

## 2021-04-13 DIAGNOSIS — R93.1 AGATSTON CAC SCORE, >400: Primary | ICD-10-CM

## 2021-04-13 DIAGNOSIS — E78.5 DYSLIPIDEMIA: ICD-10-CM

## 2021-04-13 DIAGNOSIS — Z78.9 STATIN INTOLERANCE: ICD-10-CM

## 2021-04-13 DIAGNOSIS — E03.4 HYPOTHYROIDISM DUE TO ACQUIRED ATROPHY OF THYROID: ICD-10-CM

## 2021-04-13 DIAGNOSIS — E55.9 VITAMIN D DEFICIENCY: ICD-10-CM

## 2021-04-13 DIAGNOSIS — R73.03 PREDIABETES: ICD-10-CM

## 2021-04-13 DIAGNOSIS — I65.23 BILATERAL CAROTID ARTERY STENOSIS: ICD-10-CM

## 2021-04-13 DIAGNOSIS — E78.2 MIXED HYPERLIPIDEMIA: ICD-10-CM

## 2021-04-13 PROCEDURE — 99213 PR OFFICE/OUTPT VISIT, EST, LEVL III, 20-29 MIN: ICD-10-PCS | Mod: 95,,, | Performed by: INTERNAL MEDICINE

## 2021-04-13 PROCEDURE — 99213 OFFICE O/P EST LOW 20 MIN: CPT | Mod: 95,,, | Performed by: INTERNAL MEDICINE

## 2021-04-13 RX ORDER — PRAVASTATIN SODIUM 40 MG/1
40 TABLET ORAL DAILY
Qty: 90 TABLET | Refills: 0 | Status: SHIPPED | OUTPATIENT
Start: 2021-04-13 | End: 2021-07-11 | Stop reason: SDUPTHER

## 2021-05-04 ENCOUNTER — PATIENT MESSAGE (OUTPATIENT)
Dept: DERMATOLOGY | Facility: CLINIC | Age: 76
End: 2021-05-04

## 2021-05-05 ENCOUNTER — PATIENT OUTREACH (OUTPATIENT)
Dept: ADMINISTRATIVE | Facility: OTHER | Age: 76
End: 2021-05-05

## 2021-05-06 ENCOUNTER — OFFICE VISIT (OUTPATIENT)
Dept: DERMATOLOGY | Facility: CLINIC | Age: 76
End: 2021-05-06
Payer: MEDICARE

## 2021-05-06 DIAGNOSIS — L72.0 MILIA: ICD-10-CM

## 2021-05-06 DIAGNOSIS — L81.4 LENTIGINES: ICD-10-CM

## 2021-05-06 DIAGNOSIS — D18.01 ANGIOMA OF SKIN: ICD-10-CM

## 2021-05-06 DIAGNOSIS — L82.0 INFLAMED SEBORRHEIC KERATOSIS: ICD-10-CM

## 2021-05-06 DIAGNOSIS — D48.5 NEOPLASM OF UNCERTAIN BEHAVIOR OF SKIN: Primary | ICD-10-CM

## 2021-05-06 DIAGNOSIS — L82.1 SEBORRHEIC KERATOSIS: ICD-10-CM

## 2021-05-06 PROCEDURE — 88342 CHG IMMUNOCYTOCHEMISTRY: ICD-10-PCS | Mod: 26,,, | Performed by: PATHOLOGY

## 2021-05-06 PROCEDURE — 88342 IMHCHEM/IMCYTCHM 1ST ANTB: CPT | Mod: 26,,, | Performed by: PATHOLOGY

## 2021-05-06 PROCEDURE — 88305 TISSUE EXAM BY PATHOLOGIST: ICD-10-PCS | Mod: 26,,, | Performed by: PATHOLOGY

## 2021-05-06 PROCEDURE — 88305 TISSUE EXAM BY PATHOLOGIST: CPT | Mod: 26,,, | Performed by: PATHOLOGY

## 2021-05-06 PROCEDURE — 17110 DESTRUCTION B9 LES UP TO 14: CPT | Mod: S$GLB,,, | Performed by: DERMATOLOGY

## 2021-05-06 PROCEDURE — 11300 SHAVE SKIN LESION 0.5 CM/<: CPT | Mod: 59,S$GLB,, | Performed by: DERMATOLOGY

## 2021-05-06 PROCEDURE — 17110 PR DESTRUCTION BENIGN LESIONS UP TO 14: ICD-10-PCS | Mod: S$GLB,,, | Performed by: DERMATOLOGY

## 2021-05-06 PROCEDURE — 88342 IMHCHEM/IMCYTCHM 1ST ANTB: CPT | Performed by: PATHOLOGY

## 2021-05-06 PROCEDURE — 99203 OFFICE O/P NEW LOW 30 MIN: CPT | Mod: 25,S$GLB,, | Performed by: DERMATOLOGY

## 2021-05-06 PROCEDURE — 11300 PR SHAV SKIN LES < 0.5 CM TRUNK,ARM,LEG: ICD-10-PCS | Mod: 59,S$GLB,, | Performed by: DERMATOLOGY

## 2021-05-06 PROCEDURE — 99203 PR OFFICE/OUTPT VISIT, NEW, LEVL III, 30-44 MIN: ICD-10-PCS | Mod: 25,S$GLB,, | Performed by: DERMATOLOGY

## 2021-05-06 PROCEDURE — 88305 TISSUE EXAM BY PATHOLOGIST: CPT | Performed by: PATHOLOGY

## 2021-05-13 LAB
FINAL PATHOLOGIC DIAGNOSIS: NORMAL
GROSS: NORMAL
Lab: NORMAL
MICROSCOPIC EXAM: NORMAL

## 2021-05-26 ENCOUNTER — PATIENT MESSAGE (OUTPATIENT)
Dept: DERMATOLOGY | Facility: CLINIC | Age: 76
End: 2021-05-26

## 2021-06-27 ENCOUNTER — PATIENT MESSAGE (OUTPATIENT)
Dept: OBSTETRICS AND GYNECOLOGY | Facility: CLINIC | Age: 76
End: 2021-06-27

## 2021-06-28 RX ORDER — FLUCONAZOLE 150 MG/1
TABLET ORAL
Qty: 20 TABLET | Refills: 0 | Status: SHIPPED | OUTPATIENT
Start: 2021-06-28

## 2021-07-11 DIAGNOSIS — Z78.9 STATIN INTOLERANCE: ICD-10-CM

## 2021-07-11 DIAGNOSIS — E78.5 DYSLIPIDEMIA: ICD-10-CM

## 2021-07-12 RX ORDER — PRAVASTATIN SODIUM 40 MG/1
40 TABLET ORAL DAILY
Qty: 90 TABLET | Refills: 3 | Status: SHIPPED | OUTPATIENT
Start: 2021-07-12 | End: 2022-07-17

## 2021-08-03 ENCOUNTER — PATIENT MESSAGE (OUTPATIENT)
Dept: OBSTETRICS AND GYNECOLOGY | Facility: CLINIC | Age: 76
End: 2021-08-03

## 2021-08-03 RX ORDER — ESTRADIOL 10 UG/1
10 INSERT VAGINAL
Qty: 30 TABLET | Refills: 3 | Status: SHIPPED | OUTPATIENT
Start: 2021-08-05 | End: 2022-08-05

## 2021-08-16 ENCOUNTER — TELEPHONE (OUTPATIENT)
Dept: OPHTHALMOLOGY | Facility: CLINIC | Age: 76
End: 2021-08-16

## 2021-08-17 ENCOUNTER — PATIENT MESSAGE (OUTPATIENT)
Dept: INTERNAL MEDICINE | Facility: CLINIC | Age: 76
End: 2021-08-17

## 2021-08-17 ENCOUNTER — PATIENT MESSAGE (OUTPATIENT)
Dept: DERMATOLOGY | Facility: CLINIC | Age: 76
End: 2021-08-17

## 2021-08-17 RX ORDER — BIMATOPROST 0.3 MG/ML
1 SOLUTION/ DROPS OPHTHALMIC NIGHTLY
Qty: 0.03 ML | Refills: 1 | Status: CANCELLED | OUTPATIENT
Start: 2021-08-17

## 2021-08-19 ENCOUNTER — TELEPHONE (OUTPATIENT)
Dept: DERMATOLOGY | Facility: CLINIC | Age: 76
End: 2021-08-19

## 2021-08-19 ENCOUNTER — PATIENT MESSAGE (OUTPATIENT)
Dept: DERMATOLOGY | Facility: CLINIC | Age: 76
End: 2021-08-19

## 2021-08-19 RX ORDER — BIMATOPROST 3 UG/ML
SOLUTION TOPICAL
Qty: 3 ML | Refills: 6 | Status: SHIPPED | OUTPATIENT
Start: 2021-08-19 | End: 2023-03-15 | Stop reason: SDUPTHER

## 2021-08-20 ENCOUNTER — TELEPHONE (OUTPATIENT)
Dept: DERMATOLOGY | Facility: CLINIC | Age: 76
End: 2021-08-20

## 2021-08-23 ENCOUNTER — PATIENT MESSAGE (OUTPATIENT)
Dept: INTERNAL MEDICINE | Facility: CLINIC | Age: 76
End: 2021-08-23

## 2021-09-09 ENCOUNTER — PATIENT MESSAGE (OUTPATIENT)
Dept: INTERNAL MEDICINE | Facility: CLINIC | Age: 76
End: 2021-09-09

## 2021-09-09 DIAGNOSIS — T78.40XD ALLERGY, SUBSEQUENT ENCOUNTER: Primary | ICD-10-CM

## 2021-09-09 DIAGNOSIS — J45.20 MILD INTERMITTENT ASTHMA WITHOUT COMPLICATION: ICD-10-CM

## 2021-09-09 RX ORDER — FLUTICASONE PROPIONATE 50 MCG
1 SPRAY, SUSPENSION (ML) NASAL DAILY
Qty: 16 G | Refills: 11 | Status: SHIPPED | OUTPATIENT
Start: 2021-09-09 | End: 2022-02-14 | Stop reason: SDUPTHER

## 2021-09-09 RX ORDER — FLUTICASONE PROPIONATE AND SALMETEROL 100; 50 UG/1; UG/1
1 POWDER RESPIRATORY (INHALATION) 2 TIMES DAILY
Qty: 180 EACH | Refills: 0 | Status: SHIPPED | OUTPATIENT
Start: 2021-09-09 | End: 2024-03-14

## 2021-09-15 ENCOUNTER — PATIENT MESSAGE (OUTPATIENT)
Dept: INTERNAL MEDICINE | Facility: CLINIC | Age: 76
End: 2021-09-15

## 2021-11-08 DIAGNOSIS — I65.23 BILATERAL CAROTID ARTERY STENOSIS: Primary | ICD-10-CM

## 2021-11-18 ENCOUNTER — PATIENT MESSAGE (OUTPATIENT)
Dept: DERMATOLOGY | Facility: CLINIC | Age: 76
End: 2021-11-18
Payer: MEDICARE

## 2022-01-25 ENCOUNTER — OFFICE VISIT (OUTPATIENT)
Dept: OPTOMETRY | Facility: CLINIC | Age: 77
End: 2022-01-25
Payer: MEDICARE

## 2022-01-25 DIAGNOSIS — H52.4 HYPEROPIA WITH ASTIGMATISM AND PRESBYOPIA, BILATERAL: Primary | ICD-10-CM

## 2022-01-25 DIAGNOSIS — H52.203 HYPEROPIA WITH ASTIGMATISM AND PRESBYOPIA, BILATERAL: ICD-10-CM

## 2022-01-25 DIAGNOSIS — H40.033 ANATOMICAL NARROW ANGLE, BILATERAL: ICD-10-CM

## 2022-01-25 DIAGNOSIS — H52.4 HYPEROPIA WITH ASTIGMATISM AND PRESBYOPIA, BILATERAL: ICD-10-CM

## 2022-01-25 DIAGNOSIS — H04.123 DRY EYE SYNDROME OF BOTH EYES: ICD-10-CM

## 2022-01-25 DIAGNOSIS — H52.03 HYPEROPIA WITH ASTIGMATISM AND PRESBYOPIA, BILATERAL: ICD-10-CM

## 2022-01-25 DIAGNOSIS — H25.13 NUCLEAR SCLEROSIS OF BOTH EYES: Primary | ICD-10-CM

## 2022-01-25 DIAGNOSIS — H52.03 HYPEROPIA WITH ASTIGMATISM AND PRESBYOPIA, BILATERAL: Primary | ICD-10-CM

## 2022-01-25 DIAGNOSIS — R73.03 PREDIABETES: ICD-10-CM

## 2022-01-25 DIAGNOSIS — H52.203 HYPEROPIA WITH ASTIGMATISM AND PRESBYOPIA, BILATERAL: Primary | ICD-10-CM

## 2022-01-25 DIAGNOSIS — Z97.3 WEARS CONTACT LENSES: ICD-10-CM

## 2022-01-25 PROCEDURE — 92015 PR REFRACTION: ICD-10-PCS | Mod: ,,, | Performed by: OPTOMETRIST

## 2022-01-25 PROCEDURE — 99499 UNLISTED E&M SERVICE: CPT | Mod: S$PBB,,, | Performed by: OPTOMETRIST

## 2022-01-25 PROCEDURE — 92015 DETERMINE REFRACTIVE STATE: CPT | Mod: ,,, | Performed by: OPTOMETRIST

## 2022-01-25 PROCEDURE — 92310 PR CONTACT LENS FITTING (NO CHANGE): ICD-10-PCS | Mod: CSM,,, | Performed by: OPTOMETRIST

## 2022-01-25 PROCEDURE — 99999 PR PBB SHADOW E&M-EST. PATIENT-LVL III: ICD-10-PCS | Mod: PBBFAC,,, | Performed by: OPTOMETRIST

## 2022-01-25 PROCEDURE — 99999 PR PBB SHADOW E&M-EST. PATIENT-LVL III: CPT | Mod: PBBFAC,,, | Performed by: OPTOMETRIST

## 2022-01-25 PROCEDURE — 92004 PR EYE EXAM, NEW PATIENT,COMPREHESV: ICD-10-PCS | Mod: S$PBB,,, | Performed by: OPTOMETRIST

## 2022-01-25 PROCEDURE — 99499 NO LOS: ICD-10-PCS | Mod: S$PBB,,, | Performed by: OPTOMETRIST

## 2022-01-25 PROCEDURE — 99213 OFFICE O/P EST LOW 20 MIN: CPT | Mod: PBBFAC | Performed by: OPTOMETRIST

## 2022-01-25 PROCEDURE — 92310 CONTACT LENS FITTING OU: CPT | Mod: CSM,,, | Performed by: OPTOMETRIST

## 2022-01-25 PROCEDURE — 92004 COMPRE OPH EXAM NEW PT 1/>: CPT | Mod: S$PBB,,, | Performed by: OPTOMETRIST

## 2022-01-25 NOTE — PROGRESS NOTES
HPI     Last eye exam was approximately 1 year ago.  Patient states vision has gotten worse recently but thinks it could be   related to dryness from heaters. Wears SCL dailies (monovision) for   several years but OS SCL becomes irritated after about 12 hours. Has to   use AT's at that time or OS will get very irritated. Has been given PF in   the past to help with irritation but doesn't use it all the time. Also   gets strands of mucus OS. Vision is blurry when reading with SCL's  and   uses prescription reading glasses over them. Finds vision fluctuates with   and without glasses. Wanted to know if cataracts are ready to be removed   so she didn't have to use SCL's.   Patient denies diplopia, headaches, flashes/floaters, and pain.    Pataday qAM OU  Bepreve qhs OU  Refresh prn OU  Latisse QD BUL    Last edited by Sissy Medina MA on 1/25/2022  1:32 PM. (History)            Assessment /Plan     For exam results, see Encounter Report.    Nuclear sclerosis of both eyes    Prediabetes    Anatomical narrow angle, bilateral    Dry eye syndrome of both eyes    Hyperopia with astigmatism and presbyopia, bilateral            1.  Educated on cataracts and affects on vision.  Early-monitor.  2.  No retinopathy--monitor yearly.  BS control.  Eye health normal OU.   3.  PIs patent OU.  Family history of glaucoma mother.  Angles open OU.  IOPs stable OU.  No signs of glaucoma today.  Monitor yearly.  4.  Continue with Refresh as needed.  Has tried Restasis in the past but did not find helpful.  Discussed Xiidra.    5.  Had LASIK at age 59--monovision.  Dispensed Total1 trials in monovision.  RTC 2-3 weeks for contact check.  Will see at that time if reading glasses over contacts needs to be updated.

## 2022-01-26 ENCOUNTER — PATIENT MESSAGE (OUTPATIENT)
Dept: OPTOMETRY | Facility: CLINIC | Age: 77
End: 2022-01-26
Payer: MEDICARE

## 2022-01-26 PROBLEM — Z97.3 WEARS CONTACT LENSES: Status: ACTIVE | Noted: 2022-01-26

## 2022-01-26 NOTE — PROGRESS NOTES
Assessment /Plan     For exam results, see Encounter Report.    Hyperopia with astigmatism and presbyopia, bilateral    Wears contact lenses          1-2.  See note with same date.

## 2022-01-28 ENCOUNTER — PATIENT MESSAGE (OUTPATIENT)
Dept: OPTOMETRY | Facility: CLINIC | Age: 77
End: 2022-01-28
Payer: MEDICARE

## 2022-02-13 ENCOUNTER — PATIENT MESSAGE (OUTPATIENT)
Dept: PRIMARY CARE CLINIC | Facility: CLINIC | Age: 77
End: 2022-02-13
Payer: MEDICARE

## 2022-02-13 DIAGNOSIS — T78.40XD ALLERGY, SUBSEQUENT ENCOUNTER: ICD-10-CM

## 2022-02-14 RX ORDER — FLUTICASONE PROPIONATE 50 MCG
1 SPRAY, SUSPENSION (ML) NASAL DAILY
Qty: 16 G | Refills: 11 | Status: SHIPPED | OUTPATIENT
Start: 2022-02-14 | End: 2024-03-11

## 2022-02-17 ENCOUNTER — OFFICE VISIT (OUTPATIENT)
Dept: OPTOMETRY | Facility: CLINIC | Age: 77
End: 2022-02-17
Payer: MEDICARE

## 2022-02-17 DIAGNOSIS — H52.4 HYPEROPIA WITH ASTIGMATISM AND PRESBYOPIA, BILATERAL: Primary | ICD-10-CM

## 2022-02-17 DIAGNOSIS — H52.03 HYPEROPIA WITH ASTIGMATISM AND PRESBYOPIA, BILATERAL: Primary | ICD-10-CM

## 2022-02-17 DIAGNOSIS — Z97.3 WEARS CONTACT LENSES: ICD-10-CM

## 2022-02-17 DIAGNOSIS — H52.203 HYPEROPIA WITH ASTIGMATISM AND PRESBYOPIA, BILATERAL: Primary | ICD-10-CM

## 2022-02-17 PROCEDURE — 92499 PR CONTACT LENS F/U LEV 1: ICD-10-PCS | Mod: ,,, | Performed by: OPTOMETRIST

## 2022-02-17 PROCEDURE — 92499 UNLISTED OPH SVC/PROCEDURE: CPT | Mod: ,,, | Performed by: OPTOMETRIST

## 2022-03-16 ENCOUNTER — PATIENT MESSAGE (OUTPATIENT)
Dept: ADMINISTRATIVE | Facility: HOSPITAL | Age: 77
End: 2022-03-16
Payer: MEDICARE

## 2022-03-21 ENCOUNTER — PES CALL (OUTPATIENT)
Dept: ADMINISTRATIVE | Facility: CLINIC | Age: 77
End: 2022-03-21
Payer: MEDICARE

## 2022-04-11 ENCOUNTER — IMMUNIZATION (OUTPATIENT)
Dept: PRIMARY CARE CLINIC | Facility: CLINIC | Age: 77
End: 2022-04-11
Payer: MEDICARE

## 2022-04-11 DIAGNOSIS — Z23 NEED FOR VACCINATION: Primary | ICD-10-CM

## 2022-04-11 PROCEDURE — 91305 COVID-19, MRNA, LNP-S, PF, 30 MCG/0.3 ML DOSE VACCINE (PFIZER): CPT | Mod: PBBFAC | Performed by: INTERNAL MEDICINE

## 2022-05-04 ENCOUNTER — OFFICE VISIT (OUTPATIENT)
Dept: DERMATOLOGY | Facility: CLINIC | Age: 77
End: 2022-05-04
Payer: MEDICARE

## 2022-05-04 DIAGNOSIS — L28.0 LSC (LICHEN SIMPLEX CHRONICUS): Primary | ICD-10-CM

## 2022-05-04 PROCEDURE — 99999 PR PBB SHADOW E&M-EST. PATIENT-LVL IV: ICD-10-PCS | Mod: PBBFAC,,, | Performed by: DERMATOLOGY

## 2022-05-04 PROCEDURE — 99214 OFFICE O/P EST MOD 30 MIN: CPT | Mod: PBBFAC | Performed by: DERMATOLOGY

## 2022-05-04 PROCEDURE — 99999 PR PBB SHADOW E&M-EST. PATIENT-LVL IV: CPT | Mod: PBBFAC,,, | Performed by: DERMATOLOGY

## 2022-05-04 PROCEDURE — 99213 OFFICE O/P EST LOW 20 MIN: CPT | Mod: S$PBB,,, | Performed by: DERMATOLOGY

## 2022-05-04 PROCEDURE — 99213 PR OFFICE/OUTPT VISIT, EST, LEVL III, 20-29 MIN: ICD-10-PCS | Mod: S$PBB,,, | Performed by: DERMATOLOGY

## 2022-05-04 RX ORDER — TRIAMCINOLONE ACETONIDE 1 MG/G
CREAM TOPICAL
Qty: 45 G | Refills: 1 | Status: SHIPPED | OUTPATIENT
Start: 2022-05-04 | End: 2023-03-15 | Stop reason: ALTCHOICE

## 2022-05-04 NOTE — PATIENT INSTRUCTIONS
XEROSIS (DRY SKIN)        Definition    Xerosis is the term for dry skin.  We all have a natural oil coating over our skin produced by the skin oil glands.  If this oil is removed, the skin becomes dry which can lead to cracking, which can lead to inflammation.  Xerosis is usually a long-term problem that recurs often, especially in the winter.    Cause    Long hot baths or showers can remove our natural oil and lead to xerosis.  One should never take more than one bath or shower a day and for no longer than ten minutes.  Use of harsh soaps such as Zest, Dial, and Ivory can worsen and cause xerosis.  Cold winter weather worsens xerosis because the amount of moisture contained in cold air is much less than the amount of moisture in warm air.    Treatment    Treatment is intended to restore the natural oil to your skin.  Keep the skin lubricated.    Do not take more than one bath or shower a day.  Use lukewarm water, not hot.  Hot water dries out the skin.    Use a gentle moisturizing soap such as Cetaphil soap, Oil of Olay, Dove, Basis, Ivory moisture care, Restoraderm cleanser.    When toweling dry, dont rub.  Blot the skin so there is still some water left on the skin.  You should apply a moisturizing cream to all of the skin such as Cerave cream, Cetaphil cream, Lipikar Pace AP+ Intense Repair Moisturizing Cream or Restoraderm or Eucerin Original Formula cream.   Alpha hydroxyacid lotions, i.e., AmLactin, also work very well for preventing dry skin, but may burn when used on inflamed or reddened skin.    If you like to swim during the winter months, you should not use soap when getting out of the pool.  When you have finished swimming, rinse off the chlorine with cool to warm water.  If this will be the only shower of the day, then you may use Cetaphil or another mild soap to cleanse your skin.  After the shower, apply a moisturizing cream to all of the skin as above.        [address] [facility] [place] [address]/ [phone] FAX/ [email or web address]     Sun Protection      The [facility] would like to remind you of the importance of sun protection all year round and particularly during the summer when the suns rays are the strongest. It has been proven that both acute and chronic sun exposure damages our cells and leads to skin cancer. Beyond skin cancer, the sun causes 90% of the symptoms of premature skin aging, including wrinkles, lentigines (brown spots), and thin, easily bruised skin. Proper sun protection can help prevent these unwanted conditions.    Many patients report that they dont go in the sun. It has been shown that the average person receives 18 hours of incidental sun exposure per week during activities such as walking through parking lots, driving, or sitting next to windows. This accumulates to several bad sunburns per year!    In choosing sunscreen, you want one that protects against both UVA and UVB rays (broad spectrum). It is recommended that you use one of SPF 30 or higher. It is important to apply the sunscreen about 20 minutes prior to sun exposure. Most sunscreens are chemical sunscreens and a reaction must take place in the skin so that they are effective. If they are applied and then you are immediately exposed to the sun or start sweating, this reaction has not had time to take place and you are therefore unprotected. Sunscreen needs to be reapplied every 2 hours if you are participating in water sports or sweating. We recommend Elta MD or CeraVe sunscreens for daily use; however there are many options and it is most important for you to find one that you will use on a consistent basis.    If you have sensitive skin, you may do best with a sunscreen that contains only physical blockers in the active ingredient section. The only physical blockers available in the [place] currently are titanium dioxide or zinc oxide. These are typically thicker and  harder to apply, however they afford very good protection. Neutrogena Sensitive Skin, Blue Lizard Sensitive Skin (pink top) or Neutrogena Pure and Free are popular ones.     Aside from sunscreen, clothes with UV protection (UPF), wide brimmed hats, and sunglasses are other means of sun protection that we recommend.      Based on a recent study (6/2021) and out of an abundance of caution, we are recommending that you AVOID the following sunscreens as they may contain the carcinogen, benzene:    Spray and gel sunscreens  Any [employer] or [employer]reens brands as well as Max Block and TopCare brands   Neutrogena Ultra Sheer Dry-touch Water Resistant Sunscreen LOTION SPF 70   Neutrogena Sheer Zinc Dry-touch Face Sunscreen LOTION SPF 50   5.   Aveeno Baby Continuous Protection Sensitive Skin Sunscreen LOTION - Broad Spectrum SPF 50    Please note that Benzene is not an ingredient or the degradation product of any ingredient in any sunscreen. This study suggested that the findings are a result of contamination in the manufacturing process. At this point, we don't know how effectively Benzene gets through the skin, if it gets absorbed systemically, and what effects it may have.     We do know that ultraviolet radiation is a well-established carcinogen. Please use daily sun protection/avoidance and use of at least SPF 30, broad-spectrum sunscreen not listed above.                       [facility]  [facility]ERMATOLOGY [address]  [facility] [address]  Dept: [phone]  Dept Fax: [phone]

## 2022-05-04 NOTE — PROGRESS NOTES
Subjective:       Patient ID:  Juan Antonio Mejia is a 76 y.o. female who presents for   Chief Complaint   Patient presents with    Rash     buttocks     HPI  Pt here today for rash on her buttocks x months, on and off.  Pt states she has a dry, scaly area that has been persistent. Tried OTC cortisone cream and Savlon (antibiotic cream from the [place]) without clearance.  Has been sitting x long periods lately.    Of note, pt states she has a hx of extramammary Paget's disease in the vaginal area.    Review of Systems   Constitutional: Negative for fever and chills.   Skin: Positive for rash. Negative for itching.        Objective:    Physical Exam   Constitutional: She appears well-developed and well-nourished.   Neurological: She is alert and oriented to person, place, and time.   Psychiatric: She has a normal mood and affect.   Skin:   Areas Examined (abnormalities noted in diagram):   Genitals / Buttocks / Groin Inspection Performed              Diagram Legend     Erythematous scaling macule/papule c/w actinic keratosis       Vascular papule c/w angioma      Pigmented verrucoid papule/plaque c/w seborrheic keratosis      Yellow umbilicated papule c/w sebaceous hyperplasia      Irregularly shaped tan macule c/w lentigo     1-2 mm smooth white papules consistent with Milia      Movable subcutaneous cyst with punctum c/w epidermal inclusion cyst      Subcutaneous movable cyst c/w pilar cyst      Firm pink to brown papule c/w dermatofibroma      Pedunculated fleshy papule(s) c/w skin tag(s)      Evenly pigmented macule c/w junctional nevus     Mildly variegated pigmented, slightly irregular-bordered macule c/w mildly atypical nevus      Flesh colored to evenly pigmented papule c/w intradermal nevus       Pink pearly papule/plaque c/w basal cell carcinoma      Erythematous hyperkeratotic cursted plaque c/w SCC      Surgical scar with no sign of skin cancer recurrence      Open and closed comedones      Inflammatory papules  and pustules      Verrucoid papule consistent consistent with wart     Erythematous eczematous patches and plaques     Dystrophic onycholytic nail with subungual debris c/w onychomycosis     Umbilicated papule    Erythematous-base heme-crusted tan verrucoid plaque consistent with inflamed seborrheic keratosis     Erythematous Silvery Scaling Plaque c/w Psoriasis     See annotation      Assessment / Plan:        LSC (lichen simplex chronicus)  -     triamcinolone acetonide 0.1% (KENALOG) 0.1 % cream; apply topically to affected area twice daily for 1 to 2 weeks and then use as needed for flares only  Dispense: 45 g; Refill: 1  Warned patient about side effects from overuse of topical steroids, including thinning of skin, lightening of skin, easy tearing/bruising of skin, stretch marks, etc. Patient was instructed to take breaks from the topical steroid, especially if any of the above side effects are noticed.    Discussed importance of relieving pressure from that site if/when it feels tender to avoid getting pressure sores.    rtc prn

## 2022-05-31 ENCOUNTER — PATIENT MESSAGE (OUTPATIENT)
Dept: PRIMARY CARE CLINIC | Facility: CLINIC | Age: 77
End: 2022-05-31
Payer: MEDICARE

## 2022-05-31 DIAGNOSIS — E55.9 VITAMIN D DEFICIENCY: ICD-10-CM

## 2022-05-31 DIAGNOSIS — Z12.39 ENCOUNTER FOR SCREENING FOR MALIGNANT NEOPLASM OF BREAST, UNSPECIFIED SCREENING MODALITY: Primary | ICD-10-CM

## 2022-05-31 DIAGNOSIS — Z12.31 ENCOUNTER FOR SCREENING MAMMOGRAM FOR MALIGNANT NEOPLASM OF BREAST: ICD-10-CM

## 2022-06-01 ENCOUNTER — TELEPHONE (OUTPATIENT)
Dept: VASCULAR SURGERY | Facility: CLINIC | Age: 77
End: 2022-06-01
Payer: MEDICARE

## 2022-06-01 ENCOUNTER — TELEPHONE (OUTPATIENT)
Dept: CARDIOLOGY | Facility: CLINIC | Age: 77
End: 2022-06-01
Payer: MEDICARE

## 2022-06-01 DIAGNOSIS — R93.1 ELEVATED CORONARY ARTERY CALCIUM SCORE: Primary | ICD-10-CM

## 2022-06-01 DIAGNOSIS — E55.9 VITAMIN D DEFICIENCY: ICD-10-CM

## 2022-06-01 DIAGNOSIS — I65.23 BILATERAL CAROTID ARTERY STENOSIS: Primary | ICD-10-CM

## 2022-06-01 DIAGNOSIS — E03.4 HYPOTHYROIDISM DUE TO ACQUIRED ATROPHY OF THYROID: ICD-10-CM

## 2022-06-01 DIAGNOSIS — R73.03 PREDIABETES: ICD-10-CM

## 2022-06-01 DIAGNOSIS — E78.5 DYSLIPIDEMIA: ICD-10-CM

## 2022-06-01 NOTE — TELEPHONE ENCOUNTER
----- Message from Orquidea Spivey MA sent at 6/1/2022 12:39 PM CDT -----  Rosa the patient need tot alk to you about schedule a stress test please call [phone].  Thank you.

## 2022-06-01 NOTE — TELEPHONE ENCOUNTER
Returned patient's call - She has appt with Dr Rao 7/20 and would like to schedule a stress echo before visit. Will call her back with date for appt.      Orquidea Spivey MA  P Maira GELLER Staff  Caller: Unspecified (Today, 12:39 PM)  Rosa the patient need tot alk to you about schedule a stress test please call [phone].  Thank you.

## 2022-06-01 NOTE — TELEPHONE ENCOUNTER
Received call from patient to schedule FU with Dr. Williamson. Appointment scheduled, pt verified. Refused appt letter.

## 2022-06-09 ENCOUNTER — TELEPHONE (OUTPATIENT)
Dept: CARDIOLOGY | Facility: CLINIC | Age: 77
End: 2022-06-09
Payer: MEDICARE

## 2022-06-09 NOTE — TELEPHONE ENCOUNTER
Returned pt's call and scheduled stress echo for 7/14.      NICOLE Gaming Staff  Caller: Unspecified (Today, 10:27 AM)  The patient need to talk to you about her appointment do she need any test before her appointment please call [phone]. Thank you

## 2022-06-09 NOTE — TELEPHONE ENCOUNTER
----- Message from Orquidea Spivey MA sent at 6/9/2022 10:27 AM CDT -----  The patient need to talk to you about her appointment do she need any test before her appointment please call [phone]. Thank you

## 2022-06-21 ENCOUNTER — OFFICE VISIT (OUTPATIENT)
Dept: VASCULAR SURGERY | Facility: CLINIC | Age: 77
End: 2022-06-21
Payer: MEDICARE

## 2022-06-21 ENCOUNTER — HOSPITAL ENCOUNTER (OUTPATIENT)
Dept: VASCULAR SURGERY | Facility: CLINIC | Age: 77
Discharge: HOME | End: 2022-06-21
Attending: SURGERY
Payer: MEDICARE

## 2022-06-21 VITALS
DIASTOLIC BLOOD PRESSURE: 63 MMHG | BODY MASS INDEX: 25.96 KG/M2 | HEART RATE: 81 BPM | WEIGHT: 165.38 LBS | SYSTOLIC BLOOD PRESSURE: 131 MMHG | HEIGHT: 67 IN | TEMPERATURE: 99 F

## 2022-06-21 DIAGNOSIS — I65.23 BILATERAL CAROTID ARTERY STENOSIS: ICD-10-CM

## 2022-06-21 DIAGNOSIS — I65.23 BILATERAL CAROTID ARTERY STENOSIS: Primary | ICD-10-CM

## 2022-06-21 PROCEDURE — 93880 PR DUPLEX SCAN EXTRACRANIAL,BILAT: ICD-10-PCS | Mod: 26,S$PBB,, | Performed by: SURGERY

## 2022-06-21 PROCEDURE — 93880 EXTRACRANIAL BILAT STUDY: CPT | Mod: 26,S$PBB,, | Performed by: SURGERY

## 2022-06-21 PROCEDURE — 93880 EXTRACRANIAL BILAT STUDY: CPT | Mod: PBBFAC | Performed by: SURGERY

## 2022-06-21 PROCEDURE — 99215 OFFICE O/P EST HI 40 MIN: CPT | Mod: PBBFAC,25 | Performed by: SURGERY

## 2022-06-21 PROCEDURE — 99999 PR PBB SHADOW E&M-EST. PATIENT-LVL V: CPT | Mod: PBBFAC,,, | Performed by: SURGERY

## 2022-06-21 PROCEDURE — 99214 OFFICE O/P EST MOD 30 MIN: CPT | Mod: S$PBB,,, | Performed by: SURGERY

## 2022-06-21 PROCEDURE — 99214 PR OFFICE/OUTPT VISIT, EST, LEVL IV, 30-39 MIN: ICD-10-PCS | Mod: S$PBB,,, | Performed by: SURGERY

## 2022-06-21 PROCEDURE — 99999 PR PBB SHADOW E&M-EST. PATIENT-LVL V: ICD-10-PCS | Mod: PBBFAC,,, | Performed by: SURGERY

## 2022-06-21 NOTE — PROGRESS NOTES
REFERRING PHYSICIAN:  Dr. Sergio Leal (vascular surgeon, [place]).    HISTORY OF PRESENT ILLNESS:  A 77-year-old female relocated from   [place] to [place], sent for evaluation of carotid artery disease.  She has   no history of stroke, TIA or amaurosis.  Recent CT scan done for other purposes   incidentally showed significant left common carotid artery disease, per history   stenotic versus occluded (I do not have these images).    3/23/2021:  I have not seen her in approximately 15 months.  She denies stroke TIA or amaurosis    6/21/2022:  This is a 15 month follow-up.  She denies interval stroke TIA amaurosis      PAST MEDICAL HISTORY:  1.  Extramammary Paget's disease.  2.  Diabetes.  3.  COVID vaccine x2    Past surgical history, appendectomy 12/20/2019    FAMILY HISTORY:  Positive for colon cancer.    SOCIAL HISTORY:  She is a nonsmoker.    MEDICATIONS:  Include aspirin 81 mg and Zetia.  Prior LDL is 47.6  See EPIC for full list.    ALLERGIES:  None.    PHYSICAL EXAMINATION:  VITAL SIGNS:  See nursing notes.  GENERAL:  She is in no acute distress.  RESPIRATORY:  Normal effort.  Clear to auscultation.  CARDIAC:  Regular rate and rhythm, nondisplaced PMI, no murmur.  VASCULAR:  2+ radial and brachial pulses.  ABDOMEN:  No masses or tenderness.  No hepatosplenomegaly.  Aorta cannot be   palpated.  EYES:  Normal conjunctivae and lids.  ENT:  Fair dentition.  NECK:  No JVD or thyromegaly.  MUSCULOSKELETAL:  No kyphosis or scoliosis.  EXTREMITIES:  Without clubbing or cyanosis.  SKIN:  Warm and dry.  NEUROLOGIC:  Alert and oriented x3.  Normal mood and affect.  Midline tongue.    No speech difficulty or hoarseness.  5/5 motor strength in all extremities.  Stable    IMAGING:  Minimal right carotid artery stenosis, peak systolic velocity 136 ([id number], and diastolic 32 (prior 35..  Left common carotid artery  occluded, with retrograde flow from the external to the internal carotid artery.      ASSESSMENT:   Stable Left common carotid artery occlusion, asymptomatic, with minimal contralateral stenosis on the right.  Clinically asymptomatic    RECOMMENDATIONS:  1.  Continue current medical treatment  2.  Follow-up in 1 year with repeat carotid duplex    QUINTON Williamson III, MD, FACS  Professor and Chief, Vascular and Endovascular Surgery    CC: Sergio Leal

## 2022-06-22 ENCOUNTER — PATIENT MESSAGE (OUTPATIENT)
Dept: PRIMARY CARE CLINIC | Facility: CLINIC | Age: 77
End: 2022-06-22
Payer: MEDICARE

## 2022-06-23 ENCOUNTER — PATIENT MESSAGE (OUTPATIENT)
Dept: CARDIOLOGY | Facility: CLINIC | Age: 77
End: 2022-06-23
Payer: MEDICARE

## 2022-06-23 NOTE — TELEPHONE ENCOUNTER
Spoke with pt. Both mammogram orders were faxed to [facility]. Informed her she did not need to schedule with [facility]. Will cancel daughters appt with [facility].

## 2022-07-14 ENCOUNTER — HOSPITAL ENCOUNTER (OUTPATIENT)
Dept: CARDIOLOGY | Facility: HOSPITAL | Age: 77
Discharge: HOME | End: 2022-07-14
Attending: INTERNAL MEDICINE
Payer: MEDICARE

## 2022-07-14 VITALS — WEIGHT: 165 LBS | HEIGHT: 67 IN | BODY MASS INDEX: 25.9 KG/M2

## 2022-07-14 DIAGNOSIS — R93.1 ELEVATED CORONARY ARTERY CALCIUM SCORE: ICD-10-CM

## 2022-07-14 LAB
ASCENDING AORTA: 2.85 CM
BSA FOR ECHO PROCEDURE: 1.88 M2
CV ECHO LV RWT: 0.38 CM
CV STRESS BASE HR: 78 BPM
DIASTOLIC BLOOD PRESSURE: 60 MMHG
DOP CALC LVOT AREA: 3 CM2
DOP CALC LVOT DIAMETER: 1.95 CM
DOP CALC LVOT PEAK VEL: 0.97 M/S
DOP CALC LVOT STROKE VOLUME: 60.62 CM3
DOP CALCLVOT PEAK VEL VTI: 20.31 CM
E WAVE DECELERATION TIME: 179.55 MSEC
E/A RATIO: 0.73
E/E' RATIO: 10 M/S
ECHO LV POSTERIOR WALL: 0.72 CM (ref 0.6–1.1)
EJECTION FRACTION: 65 %
FRACTIONAL SHORTENING: 36 % (ref 28–44)
INTERVENTRICULAR SEPTUM: 0.67 CM (ref 0.6–1.1)
LA MAJOR: 3.71 CM
LA MINOR: 3.97 CM
LA WIDTH: 2.9 CM
LEFT ATRIUM SIZE: 3.29 CM
LEFT ATRIUM VOLUME INDEX: 16.7 ML/M2
LEFT ATRIUM VOLUME: 31.11 CM3
LEFT INTERNAL DIMENSION IN SYSTOLE: 2.42 CM (ref 2.1–4)
LEFT VENTRICLE DIASTOLIC VOLUME INDEX: 32.55 ML/M2
LEFT VENTRICLE DIASTOLIC VOLUME: 60.55 ML
LEFT VENTRICLE MASS INDEX: 38 G/M2
LEFT VENTRICLE SYSTOLIC VOLUME INDEX: 11.1 ML/M2
LEFT VENTRICLE SYSTOLIC VOLUME: 20.65 ML
LEFT VENTRICULAR INTERNAL DIMENSION IN DIASTOLE: 3.76 CM (ref 3.5–6)
LEFT VENTRICULAR MASS: 70.02 G
LV LATERAL E/E' RATIO: 8.13 M/S
LV SEPTAL E/E' RATIO: 13 M/S
MV A" WAVE DURATION": 13.42 MSEC
MV PEAK A VEL: 0.89 M/S
MV PEAK E VEL: 0.65 M/S
MV STENOSIS PRESSURE HALF TIME: 52.07 MS
MV VALVE AREA P 1/2 METHOD: 4.23 CM2
OHS CV CPX 1 MINUTE RECOVERY HEART RATE: 102 BPM
OHS CV CPX 85 PERCENT MAX PREDICTED HEART RATE MALE: 117
OHS CV CPX ESTIMATED METS: 4
OHS CV CPX MAX PREDICTED HEART RATE: 137
OHS CV CPX PATIENT IS FEMALE: 1
OHS CV CPX PATIENT IS MALE: 0
OHS CV CPX PEAK DIASTOLIC BLOOD PRESSURE: 62 MMHG
OHS CV CPX PEAK HEAR RATE: 117 BPM
OHS CV CPX PEAK RATE PRESSURE PRODUCT: NORMAL
OHS CV CPX PEAK SYSTOLIC BLOOD PRESSURE: 184 MMHG
OHS CV CPX PERCENT MAX PREDICTED HEART RATE ACHIEVED: 85
OHS CV CPX RATE PRESSURE PRODUCT PRESENTING: 9828
PISA TR MAX VEL: 2.41 M/S
PULM VEIN S/D RATIO: 1.53
PV PEAK D VEL: 0.3 M/S
PV PEAK S VEL: 0.46 M/S
RA MAJOR: 3.56 CM
RA PRESSURE: 8 MMHG
RA WIDTH: 2.35 CM
RIGHT VENTRICULAR END-DIASTOLIC DIMENSION: 3.02 CM
RV TISSUE DOPPLER FREE WALL SYSTOLIC VELOCITY 1 (APICAL 4 CHAMBER VIEW): 11.89 CM/S
SINUS: 2.65 CM
STJ: 2.75 CM
STRESS ECHO POST EXERCISE DUR MIN: 5 MINUTES
STRESS ECHO POST EXERCISE DUR SEC: 15 SECONDS
SYSTOLIC BLOOD PRESSURE: 126 MMHG
TDI LATERAL: 0.08 M/S
TDI SEPTAL: 0.05 M/S
TDI: 0.07 M/S
TR MAX PG: 23 MMHG
TRICUSPID ANNULAR PLANE SYSTOLIC EXCURSION: 1.9 CM
TV REST PULMONARY ARTERY PRESSURE: 31 MMHG

## 2022-07-14 PROCEDURE — 93351 STRESS TTE COMPLETE: CPT | Mod: 26,,, | Performed by: INTERNAL MEDICINE

## 2022-07-14 PROCEDURE — 93351 STRESS TTE COMPLETE: CPT

## 2022-07-14 PROCEDURE — 93351 STRESS ECHO (CUPID ONLY): ICD-10-PCS | Mod: 26,,, | Performed by: INTERNAL MEDICINE

## 2022-07-18 NOTE — PROGRESS NOTES
Subjective:   Patient ID:  Juan Antonio Mejia is a 77 y.o. female who presents for follow-up of CAD risk    HPI: The patient is here for CAD risk factors.   The patient has no chest pain, SOB, TIA, palpitations, syncope or pre-syncope.Patient does not exercise  A lot.        Review of Systems   Constitutional: Negative for chills, decreased appetite, diaphoresis, fever, malaise/fatigue, night sweats, weight gain and weight loss.   HENT: Negative for congestion, hoarse voice, nosebleeds, sore throat and tinnitus.    Eyes: Negative for blurred vision, double vision, vision loss in left eye, vision loss in right eye, visual disturbance and visual halos.   Cardiovascular: Negative for chest pain, claudication, cyanosis, dyspnea on exertion, irregular heartbeat, leg swelling, near-syncope, orthopnea, palpitations, paroxysmal nocturnal dyspnea and syncope.   Respiratory: Negative for cough, hemoptysis, shortness of breath, sleep disturbances due to breathing, snoring, sputum production and wheezing.    Endocrine: Negative for cold intolerance, heat intolerance, polydipsia, polyphagia and polyuria.   Hematologic/Lymphatic: Negative for adenopathy and bleeding problem. Does not bruise/bleed easily.   Skin: Negative for color change, dry skin, flushing, itching, nail changes, poor wound healing, rash, skin cancer, suspicious lesions and unusual hair distribution.   Musculoskeletal: Negative for arthritis, back pain, falls, gout, joint pain, joint swelling, muscle cramps, muscle weakness, myalgias and stiffness.   Gastrointestinal: Negative for abdominal pain, anorexia, change in bowel habit, constipation, diarrhea, dysphagia, heartburn, hematemesis, hematochezia, melena and vomiting.   Genitourinary: Negative for decreased libido, dysuria, hematuria, hesitancy and urgency.   Neurological: Negative for excessive daytime sleepiness, dizziness, focal weakness, headaches, light-headedness, loss of balance, numbness, paresthesias,  "seizures, sensory change, tremors, vertigo and weakness.   Psychiatric/Behavioral: Negative for altered mental status, depression, hallucinations, memory loss, substance abuse and suicidal ideas. The patient does not have insomnia and is not nervous/anxious.    Allergic/Immunologic: Negative for environmental allergies and hives.       Objective: BP (!) 117/59 (BP Location: Left arm, Patient Position: Sitting, BP Method: Medium (Automatic))   Pulse 87   Ht 5' 7" (1.702 m)   Wt 76.7 kg (169 lb 1.5 oz)   BMI 26.48 kg/m²      Physical Exam  Constitutional:       Appearance: She is well-developed.   HENT:      Head: Normocephalic.   Eyes:      Pupils: Pupils are equal, round, and reactive to light.   Neck:      Thyroid: No thyromegaly.      Vascular: Normal carotid pulses. No carotid bruit, hepatojugular reflux or JVD.   Cardiovascular:      Rate and Rhythm: Normal rate and regular rhythm.      Pulses: Intact distal pulses.      Heart sounds: Normal heart sounds. No murmur heard.    No friction rub. No gallop.   Pulmonary:      Effort: Pulmonary effort is normal. No tachypnea or respiratory distress.      Breath sounds: Normal breath sounds. No wheezing or rales.   Chest:      Chest wall: No tenderness.   Abdominal:      General: Bowel sounds are normal. There is no distension.      Palpations: Abdomen is soft. There is no mass.      Tenderness: There is no abdominal tenderness. There is no guarding or rebound.   Musculoskeletal:         General: No tenderness. Normal range of motion.      Cervical back: Normal range of motion.   Lymphadenopathy:      Cervical: No cervical adenopathy.   Skin:     General: Skin is warm.      Findings: No erythema or rash.   Neurological:      Mental Status: She is alert and oriented to person, place, and time.      Cranial Nerves: No cranial nerve deficit.      Coordination: Coordination normal.   Psychiatric:         Behavior: Behavior normal.         Thought Content: Thought " content normal.         Judgment: Judgment normal.         Assessment:     1. Agatston CAC score, >400    2. Dyslipidemia    3. Prediabetes    4. Bilateral carotid artery stenosis    5. Overweight (BMI 25.0-29.9)    6. Statin intolerance    7. Hypothyroidism due to acquired atrophy of thyroid    8. Vitamin D deficiency    9. Mixed hyperlipidemia        Plan:   Discussed diet , achieving and maintaining ideal body weight, and exercise.   We reviewed meds in detail.  Reassured-Discussed goals, options, plan.  Omega-3 > 800 mg/d combined EPA/DHA.  Goal BP< 130/80.  Goal LDL < 70.      Juan Antonio was seen today for annual exam.    Diagnoses and all orders for this visit:    Agatston CAC score, >400  -     EKG 12-lead; Future; Expected date: 09/20/2023    Dyslipidemia  -     EKG 12-lead; Future; Expected date: 09/20/2023    Prediabetes  -     EKG 12-lead; Future; Expected date: 09/20/2023    Bilateral carotid artery stenosis  -     EKG 12-lead; Future; Expected date: 09/20/2023    Overweight (BMI 25.0-29.9)    Statin intolerance    Hypothyroidism due to acquired atrophy of thyroid    Vitamin D deficiency    Mixed hyperlipidemia            Follow up in about 15 months (around 10/20/2023) for with ECG.

## 2022-07-20 ENCOUNTER — OFFICE VISIT (OUTPATIENT)
Dept: CARDIOLOGY | Facility: CLINIC | Age: 77
End: 2022-07-20
Payer: MEDICARE

## 2022-07-20 VITALS
WEIGHT: 169.06 LBS | HEIGHT: 67 IN | HEART RATE: 87 BPM | DIASTOLIC BLOOD PRESSURE: 59 MMHG | SYSTOLIC BLOOD PRESSURE: 117 MMHG | BODY MASS INDEX: 26.53 KG/M2

## 2022-07-20 DIAGNOSIS — I65.23 BILATERAL CAROTID ARTERY STENOSIS: ICD-10-CM

## 2022-07-20 DIAGNOSIS — E78.2 MIXED HYPERLIPIDEMIA: ICD-10-CM

## 2022-07-20 DIAGNOSIS — E03.4 HYPOTHYROIDISM DUE TO ACQUIRED ATROPHY OF THYROID: ICD-10-CM

## 2022-07-20 DIAGNOSIS — E78.5 DYSLIPIDEMIA: ICD-10-CM

## 2022-07-20 DIAGNOSIS — E66.3 OVERWEIGHT (BMI 25.0-29.9): ICD-10-CM

## 2022-07-20 DIAGNOSIS — Z78.9 STATIN INTOLERANCE: ICD-10-CM

## 2022-07-20 DIAGNOSIS — R73.03 PREDIABETES: ICD-10-CM

## 2022-07-20 DIAGNOSIS — E55.9 VITAMIN D DEFICIENCY: ICD-10-CM

## 2022-07-20 DIAGNOSIS — R93.1 AGATSTON CAC SCORE, >400: Primary | ICD-10-CM

## 2022-07-20 PROCEDURE — 99999 PR PBB SHADOW E&M-EST. PATIENT-LVL V: ICD-10-PCS | Mod: PBBFAC,,, | Performed by: INTERNAL MEDICINE

## 2022-07-20 PROCEDURE — 99215 OFFICE O/P EST HI 40 MIN: CPT | Mod: S$PBB,,, | Performed by: INTERNAL MEDICINE

## 2022-07-20 PROCEDURE — 99999 PR PBB SHADOW E&M-EST. PATIENT-LVL V: CPT | Mod: PBBFAC,,, | Performed by: INTERNAL MEDICINE

## 2022-07-20 PROCEDURE — 99215 PR OFFICE/OUTPT VISIT, EST, LEVL V, 40-54 MIN: ICD-10-PCS | Mod: S$PBB,,, | Performed by: INTERNAL MEDICINE

## 2022-07-20 PROCEDURE — 99215 OFFICE O/P EST HI 40 MIN: CPT | Mod: PBBFAC | Performed by: INTERNAL MEDICINE

## 2022-07-20 NOTE — PATIENT INSTRUCTIONS
Discussed diet , achieving and maintaining ideal body weight, and exercise.   We reviewed meds in detail.  Reassured-Discussed goals, options, plan.  Omega-3 > 800 mg/d combined EPA/DHA.  Goal BP< 130/80.  Goal LDL < 70.

## 2022-07-27 ENCOUNTER — TELEPHONE (OUTPATIENT)
Dept: PRIMARY CARE CLINIC | Facility: CLINIC | Age: 77
End: 2022-07-27
Payer: MEDICARE

## 2022-07-27 LAB — BCS RECOMMENDATION EXT: NORMAL

## 2022-08-02 ENCOUNTER — PATIENT MESSAGE (OUTPATIENT)
Dept: OPTOMETRY | Facility: CLINIC | Age: 77
End: 2022-08-02
Payer: MEDICARE

## 2022-08-03 ENCOUNTER — TELEPHONE (OUTPATIENT)
Dept: PRIMARY CARE CLINIC | Facility: CLINIC | Age: 77
End: 2022-08-03
Payer: MEDICARE

## 2022-08-03 NOTE — TELEPHONE ENCOUNTER
Patient would like a call back to go over her mammogram results. Patient is aware that [facility] is out on until 8/8/22

## 2022-08-08 NOTE — TELEPHONE ENCOUNTER
Please reach out to the patient and find out when and where she had her mammogram completed and see if we can get a copy

## 2022-08-18 NOTE — TELEPHONE ENCOUNTER
Please reach out and let her know that her mammogram looks good and we can repeat in 1 year.  Please send it to [facility] to hyper link in health maintenance.

## 2022-08-18 NOTE — TELEPHONE ENCOUNTER
Sent pt msg via portal re: mammogram , WNL. Repeat in 1 year    Faxed mammogram over to [facility] to hyperlink to chart

## 2022-08-31 DIAGNOSIS — Z78.0 MENOPAUSE: ICD-10-CM

## 2022-09-13 ENCOUNTER — OFFICE VISIT (OUTPATIENT)
Dept: OBSTETRICS AND GYNECOLOGY | Facility: CLINIC | Age: 77
End: 2022-09-13
Payer: MEDICARE

## 2022-09-13 VITALS
SYSTOLIC BLOOD PRESSURE: 136 MMHG | WEIGHT: 169.06 LBS | DIASTOLIC BLOOD PRESSURE: 78 MMHG | BODY MASS INDEX: 26.48 KG/M2

## 2022-09-13 DIAGNOSIS — C51.9 PAGET'S DISEASE OF VULVA: Primary | ICD-10-CM

## 2022-09-13 DIAGNOSIS — T14.8XXA NON-HEALING NON-SURGICAL WOUND: ICD-10-CM

## 2022-09-13 PROCEDURE — 99999 PR PBB SHADOW E&M-EST. PATIENT-LVL V: CPT | Mod: PBBFAC,,, | Performed by: OBSTETRICS & GYNECOLOGY

## 2022-09-13 PROCEDURE — 99213 OFFICE O/P EST LOW 20 MIN: CPT | Mod: S$PBB,,, | Performed by: OBSTETRICS & GYNECOLOGY

## 2022-09-13 PROCEDURE — 99213 PR OFFICE/OUTPT VISIT, EST, LEVL III, 20-29 MIN: ICD-10-PCS | Mod: S$PBB,,, | Performed by: OBSTETRICS & GYNECOLOGY

## 2022-09-13 PROCEDURE — 99999 PR PBB SHADOW E&M-EST. PATIENT-LVL V: ICD-10-PCS | Mod: PBBFAC,,, | Performed by: OBSTETRICS & GYNECOLOGY

## 2022-09-13 PROCEDURE — 99215 OFFICE O/P EST HI 40 MIN: CPT | Mod: PBBFAC,PN | Performed by: OBSTETRICS & GYNECOLOGY

## 2022-09-13 NOTE — PROGRESS NOTES
Subjective:       Patient ID: Juan Antonio Mejia is a 77 y.o. female.    Chief Complaint:  Establish Care      History of Present Illness  HPI  77 y.o. female here to establish GYN care. History of vulvar Paget's disease, previously managed by Gyn Onc at [facility] in [facility] (Dr. Ruddy Hollingsworth) but now seeing Dr. Juan José Do in [place] to have someone closer to home. Previously this has been managed well with Aldara cream. She has a rash on her left vulva and perineum/perianal area that has been examined by Gyn Onc. She discussed possible surgical excision and has follow up in [facility] with Dr. Hollingsworth in December.    She was seen by Dermatology for a rash on her buttocks, treated with steroid cream. Initially improved, but then came back. She had punch biopsies done by Dr. Do on 8/1/22 that were benign (not Pagets). She continued to use She had records requested to be sent to me but I have not received. Concerned that the biopsy sites are not healing after 6 weeks. Painful with certain positions.      Family History   Problem Relation Age of Onset    Colon cancer Mother 79    Glaucoma Mother     Cancer Mother         colon    Prostate cancer Father     Heart attack Father     Cancer Father         prostate    Heart disease Father         50's    Mental retardation Brother     Cancer Daughter         melanoma    Macular degeneration Paternal Aunt     Breast cancer Neg Hx     Diabetes Neg Hx     Stroke Neg Hx     Hyperlipidemia Neg Hx     Hypertension Neg Hx     Cataracts Neg Hx          GYN & OB History  No LMP recorded (lmp unknown). Patient is perimenopausal.   Date of Last Pap: No result found    OB History   No obstetric history on file.       Review of Systems  Review of Systems   Constitutional:  Negative for chills, diaphoresis, fatigue and fever.   Respiratory:  Negative for cough and shortness of breath.    Cardiovascular:  Negative for chest pain and palpitations.   Gastrointestinal:  Negative for abdominal  pain, constipation, diarrhea, nausea and vomiting.   Genitourinary:  Negative for dysmenorrhea, dysuria, frequency, menorrhagia, menstrual problem, pelvic pain, vaginal bleeding, vaginal discharge and vaginal pain.   Musculoskeletal:  Negative for back pain and myalgias.   Integumentary:  Negative for rash, acne, breast mass, nipple discharge and breast skin changes.   Neurological:  Negative for headaches.   Psychiatric/Behavioral:  Negative for depression. The patient is not nervous/anxious.    Breast: Negative for mass, mastodynia, nipple discharge and skin changes        Objective:    Physical Exam:               Genitourinary:          Genitourinary Comments: See media tab for photographs.                  Skin:        Erythematous rash with punch biopsy sites open, no evidence of infection.         Assessment:        1. Paget's disease of vulva    2. Non-healing non-surgical wound             Plan:      Juan Antonio was seen today for establish care.    Diagnoses and all orders for this visit:    Paget's disease of vulva    Non-healing non-surgical wound  -     Ambulatory referral/consult to Wound Clinic; Future  Vulvar photographs taken, see media tab. Patient has follow up with Gyn Onc scheduled.   Biopsy sites not healing after 6 weeks. Referral to wound care. Consider follow up with Dermatology as well.     Orders Placed This Encounter   Procedures    Ambulatory referral/consult to Wound Clinic       No follow-ups on file.

## 2022-09-14 ENCOUNTER — PATIENT OUTREACH (OUTPATIENT)
Dept: ADMINISTRATIVE | Facility: HOSPITAL | Age: 77
End: 2022-09-14
Payer: MEDICARE

## 2022-09-14 ENCOUNTER — TELEPHONE (OUTPATIENT)
Dept: ADMINISTRATIVE | Facility: OTHER | Age: 77
End: 2022-09-14
Payer: MEDICARE

## 2022-09-14 ENCOUNTER — TELEPHONE (OUTPATIENT)
Dept: PODIATRY | Facility: CLINIC | Age: 77
End: 2022-09-14
Payer: MEDICARE

## 2022-09-14 NOTE — TELEPHONE ENCOUNTER
9/14/22 Called pt, no answer. Left vm to return call JV for Wound Care Appointment     Order Specific Questions

## 2022-10-05 ENCOUNTER — OFFICE VISIT (OUTPATIENT)
Dept: WOUND CARE | Facility: CLINIC | Age: 77
End: 2022-10-05
Payer: MEDICARE

## 2022-10-05 DIAGNOSIS — C51.9 PAGET'S DISEASE OF VULVA: ICD-10-CM

## 2022-10-05 DIAGNOSIS — L98.499: Primary | ICD-10-CM

## 2022-10-05 PROCEDURE — 99999 PR PBB SHADOW E&M-EST. PATIENT-LVL III: CPT | Mod: PBBFAC,,, | Performed by: CLINICAL NURSE SPECIALIST

## 2022-10-05 PROCEDURE — 99203 PR OFFICE/OUTPT VISIT, NEW, LEVL III, 30-44 MIN: ICD-10-PCS | Mod: S$PBB,,, | Performed by: CLINICAL NURSE SPECIALIST

## 2022-10-05 PROCEDURE — 99203 OFFICE O/P NEW LOW 30 MIN: CPT | Mod: S$PBB,,, | Performed by: CLINICAL NURSE SPECIALIST

## 2022-10-05 PROCEDURE — 99999 PR PBB SHADOW E&M-EST. PATIENT-LVL III: ICD-10-PCS | Mod: PBBFAC,,, | Performed by: CLINICAL NURSE SPECIALIST

## 2022-10-05 PROCEDURE — 99213 OFFICE O/P EST LOW 20 MIN: CPT | Mod: PBBFAC | Performed by: CLINICAL NURSE SPECIALIST

## 2022-10-05 NOTE — PROGRESS NOTES
Subjective:       Patient ID: Juan Antonio Mejia is a 77 y.o. female.    Chief Complaint: Wound Check    This is new pt sent for consult to help heal the non healing biopsy sites on each buttocks, these were taken to check for Pagets and they were neg the sites are still not healed and hurt quite a bit when she sits on certain surfaces. She has a picture of all of the 9 products she has tried thus far,    Review of Systems   Constitutional: Negative.    HENT:  Negative for nasal congestion.    Respiratory: Negative.     Cardiovascular: Negative.    Genitourinary: Negative.    Musculoskeletal: Negative.    Integumentary:  Positive for wound.       Objective:      Physical Exam  Constitutional:       Appearance: Normal appearance.   Pulmonary:      Effort: Pulmonary effort is normal. No respiratory distress.   Skin:     General: Skin is warm and dry.      Findings: No erythema.   Neurological:      Mental Status: She is alert.       Both sides of buttocks have punch sites that are dry and painful to touch, she feels leaving them alone has helped the most, I have reviewed all items she has used and we discussed option of trying HYDROCOLLOID, both a thin and thick version to see if one works better  Applied extra thin to left side and DuoDerm CGF to right and gave extra pieces to try .   Assessment:       Problem List Items Addressed This Visit          Unprioritized    Paget's disease of vulva     Other Visit Diagnoses       Non-healing ulcer of multiple sites, unspecified ulcer stage    -  Primary    from previous BX s              Plan:       Topical care with Hydrocolloid wafer to the ulcers on each buttock  clean with vashe first , dry well then applied small square on each   Fu as needed , if not better in few weeks  I have reviewed the plan of care with the patient and she express understanding. I spent over 50% of this 30 minute visit in face to face counseling.

## 2022-10-21 ENCOUNTER — TELEPHONE (OUTPATIENT)
Dept: WOUND CARE | Facility: CLINIC | Age: 77
End: 2022-10-21
Payer: MEDICARE

## 2022-10-21 NOTE — TELEPHONE ENCOUNTER
"Pt had reaction to Duoderm adhesive although the "sores" she used on are better, the surrounding skin is red and irritated.  She wants to use the COMFEEL as it does not bother her skin as badly,  I have advised where to get and to also get BRAVA adh remover.    Pt is happy with progress and using smallest piece possible to cover the original wound sites.   "

## 2022-10-21 NOTE — TELEPHONE ENCOUNTER
----- Message from Yumiko Levy sent at 10/21/2022 10:44 AM CDT -----  Contact: Pt [phone]  Pt called stating her wound is not healing and she is unable to find the hydrocolloid bandages please  call and advise @ [phone]

## 2022-10-26 ENCOUNTER — TELEPHONE (OUTPATIENT)
Dept: DERMATOLOGY | Facility: CLINIC | Age: 77
End: 2022-10-26
Payer: MEDICARE

## 2022-10-26 NOTE — TELEPHONE ENCOUNTER
Pt appt has been made for next available and add to wait list----- Message from Gudelia Ngo sent at 10/26/2022 11:25 AM CDT -----  Regarding: speak with nurse  Contact: patient  [phone]    please call patient need to get in for skin check need something before next year waiting on a call back thanks.

## 2022-11-30 ENCOUNTER — OFFICE VISIT (OUTPATIENT)
Dept: PRIMARY CARE CLINIC | Facility: CLINIC | Age: 77
End: 2022-11-30
Payer: MEDICARE

## 2022-11-30 DIAGNOSIS — B34.9 ACUTE VIRAL SYNDROME: Primary | ICD-10-CM

## 2022-11-30 DIAGNOSIS — J45.20 MILD INTERMITTENT ASTHMA WITHOUT COMPLICATION: ICD-10-CM

## 2022-11-30 PROCEDURE — 99213 PR OFFICE/OUTPT VISIT, EST, LEVL III, 20-29 MIN: ICD-10-PCS | Mod: 95,,, | Performed by: FAMILY MEDICINE

## 2022-11-30 PROCEDURE — 99213 OFFICE O/P EST LOW 20 MIN: CPT | Mod: 95,,, | Performed by: FAMILY MEDICINE

## 2022-11-30 RX ORDER — ALBUTEROL SULFATE 90 UG/1
2 AEROSOL, METERED RESPIRATORY (INHALATION) EVERY 4 HOURS PRN
Qty: 18 G | Refills: 1 | Status: SHIPPED | OUTPATIENT
Start: 2022-11-30

## 2022-11-30 RX ORDER — OSELTAMIVIR PHOSPHATE 75 MG/1
75 CAPSULE ORAL 2 TIMES DAILY
Qty: 10 CAPSULE | Refills: 0 | Status: SHIPPED | OUTPATIENT
Start: 2022-11-30 | End: 2022-12-05

## 2022-11-30 NOTE — PROGRESS NOTES
Subjective:       Patient ID: Juan Antonio Mejia is a 77 y.o. female.    Chief Complaint: No chief complaint on file.      Home COVID test negative     URI   This is a new problem. The current episode started in the past 7 days (2 days ago). The problem has been unchanged. The maximum temperature recorded prior to her arrival was 100.4 - 100.9 F. The fever has been present for 1 to 2 days. Associated symptoms include congestion, coughing, ear pain and wheezing. Pertinent negatives include no abdominal pain, diarrhea or vomiting.   The patient location is: [place]  The chief complaint leading to consultation is: sick    Visit type: audiovisual    Face to Face time with patient: 9 min  13 minutes of total time spent on the encounter, which includes face to face time and non-face to face time preparing to see the patient (eg, review of tests), Obtaining and/or reviewing separately obtained history, Documenting clinical information in the electronic or other health record, Independently interpreting results (not separately reported) and communicating results to the patient/family/caregiver, or Care coordination (not separately reported).         Each patient to whom he or she provides medical services by telemedicine is:  (1) informed of the relationship between the physician and patient and the respective role of any other health care provider with respect to management of the patient; and (2) notified that he or she may decline to receive medical services by telemedicine and may withdraw from such care at any time.    Notes:    Review of Systems   Constitutional:  Positive for fever.   HENT:  Positive for congestion and ear pain.    Respiratory:  Positive for cough and wheezing.    Gastrointestinal:  Negative for abdominal pain, diarrhea and vomiting.   Musculoskeletal:  Positive for arthralgias and myalgias.   Allergic/Immunologic: Negative for immunocompromised state.   Psychiatric/Behavioral:  Negative for agitation and  confusion.      Objective:      There were no vitals filed for this visit.  Physical Exam  Constitutional:       General: She is not in acute distress.     Appearance: Normal appearance. She is well-developed.   Pulmonary:      Effort: No respiratory distress.   Neurological:      Mental Status: She is alert and oriented to person, place, and time.   Psychiatric:         Behavior: Behavior normal.       Lab Results   Component Value Date    WBC 14.58 (H) 12/02/2019    HGB 12.7 12/02/2019    HCT 40.7 12/02/2019     12/02/2019    CHOL 139 06/21/2022    TRIG 147 06/21/2022    HDL 51 06/21/2022    ALT 19 06/21/2022    AST 15 06/21/2022     06/21/2022    K 4.8 06/21/2022     06/21/2022    CREATININE 0.7 06/21/2022    BUN 13 06/21/2022    CO2 29 06/21/2022    TSH 2.874 06/21/2022    HGBA1C 5.6 06/21/2022      Assessment:       1. Acute viral syndrome    2. Mild intermittent asthma without complication          Plan:       Acute viral syndrome  -     oseltamivir (TAMIFLU) 75 MG capsule; Take 1 capsule (75 mg total) by mouth 2 (two) times daily. for 5 days  Dispense: 10 capsule; Refill: 0  Will treat empirically for flu. F/U for any new or worsening symptoms  Mild intermittent asthma without complication  -     albuterol (VENTOLIN HFA) 90 mcg/actuation inhaler; Inhale 2 puffs into the lungs every 4 (four) hours as needed for Wheezing or Shortness of Breath. Rescue  Dispense: 18 g; Refill: 1      Medication List with Changes/Refills   New Medications    OSELTAMIVIR (TAMIFLU) 75 MG CAPSULE    Take 1 capsule (75 mg total) by mouth 2 (two) times daily. for 5 days   Current Medications    ASPIRIN (ECOTRIN) 81 MG EC TABLET    Take 81 mg by mouth once daily.     BEPREVE 1.5 % DROP    One drop in each eye at night    BIMATOPROST (LATISSE) 0.03 % OPHTHALMIC SOLUTION    Place one drop on applicator and apply evenly along the skin of the upper eyelid at base of eyelashes qhs; repeat for second eye    BIOTIN 10,000  MCG CAP    Take by mouth once daily.    EZETIMIBE (ZETIA) 10 MG TABLET    once daily.     FLUCONAZOLE (DIFLUCAN) 150 MG TAB    Take 1 tablet weekly as needed for yeast.    FLUOXETINE 20 MG CAPSULE    20 mg once daily.     FLUTICASONE PROPIONATE (FLONASE) 50 MCG/ACTUATION NASAL SPRAY    1 spray (50 mcg total) by Each Nostril route once daily.    FLUTICASONE-SALMETEROL DISKUS INHALER 100-50 MCG    Inhale 1 puff into the lungs 2 (two) times daily. Controller    GLUCOSAMINE HCL (GLUCOSAMINE, BULK, MISC)    3,000 mg by Misc.(Non-Drug; Combo Route) route once daily.    INVOKANA 300 MG TAB TABLET    300 mg once daily.     LATISSE 0.03 % OPHTHALMIC SOLUTION    Place into both eyes nightly.     LEVOTHYROXINE (SYNTHROID) 75 MCG TABLET    Take 1 tablet (75 mcg total) by mouth before breakfast.    LIOTHYRONINE (CYTOMEL) 5 MCG TAB    5 mcg once daily.     MAGNESIUM ORAL    Take 100 mg by mouth once daily.    METFORMIN (GLUCOPHAGE-XR) 500 MG 24 HR TABLET    500 mg 4 (four) times daily as needed. Pt is taking 2 tablets twice daily    NYSTATIN-TRIAMCINOLONE (MYCOLOG II) CREAM    Apply to affected area 2 times daily    OZEMPIC 0.25 MG OR 0.5 MG(2 MG/1.5 ML) PNIJ    once a week.     OZEMPIC 1 MG/DOSE (4 MG/3 ML)        PRAVASTATIN (PRAVACHOL) 40 MG TABLET    TAKE ONE TABLET BY MOUTH ONE TIME DAILY    SPIRONOLACTONE (ALDACTONE) 100 MG TABLET    Take 50 mg by mouth once daily.     TRIAMCINOLONE ACETONIDE 0.1% (KENALOG) 0.1 % CREAM    apply topically to affected area twice daily for 1 to 2 weeks and then use as needed for flares only    VITAMIN D (VITAMIN D3) 1000 UNITS TAB    Take 1,000 Units by mouth once daily.    YUVAFEM 10 MCG TAB    once daily. 2x a week    ZOLPIDEM (AMBIEN CR) 6.25 MG CR TABLET    Take 1 tablet (6.25 mg total) by mouth every evening.   Changed and/or Refilled Medications    Modified Medication Previous Medication    ALBUTEROL (VENTOLIN HFA) 90 MCG/ACTUATION INHALER albuterol (VENTOLIN HFA) 90 mcg/actuation  inhaler       Inhale 2 puffs into the lungs every 4 (four) hours as needed for Wheezing or Shortness of Breath. Rescue    Inhale 1 puff into the lungs every 6 (six) hours as needed for Wheezing or Shortness of Breath. Rescue

## 2023-01-17 ENCOUNTER — OFFICE VISIT (OUTPATIENT)
Dept: OBSTETRICS AND GYNECOLOGY | Facility: CLINIC | Age: 78
End: 2023-01-17
Payer: MEDICARE

## 2023-01-17 VITALS — SYSTOLIC BLOOD PRESSURE: 129 MMHG | WEIGHT: 162.81 LBS | DIASTOLIC BLOOD PRESSURE: 67 MMHG | BODY MASS INDEX: 25.5 KG/M2

## 2023-01-17 DIAGNOSIS — C51.9 PAGET'S DISEASE OF VULVA: ICD-10-CM

## 2023-01-17 DIAGNOSIS — Z01.419 WELL WOMAN EXAM WITH ROUTINE GYNECOLOGICAL EXAM: Primary | ICD-10-CM

## 2023-01-17 DIAGNOSIS — Z12.39 ENCOUNTER FOR SCREENING FOR MALIGNANT NEOPLASM OF BREAST, UNSPECIFIED SCREENING MODALITY: ICD-10-CM

## 2023-01-17 PROCEDURE — G0101 CA SCREEN;PELVIC/BREAST EXAM: HCPCS | Mod: PBBFAC,PN | Performed by: OBSTETRICS & GYNECOLOGY

## 2023-01-17 PROCEDURE — 99214 OFFICE O/P EST MOD 30 MIN: CPT | Mod: PBBFAC,PN | Performed by: OBSTETRICS & GYNECOLOGY

## 2023-01-17 PROCEDURE — 99999 PR PBB SHADOW E&M-EST. PATIENT-LVL IV: ICD-10-PCS | Mod: PBBFAC,,, | Performed by: OBSTETRICS & GYNECOLOGY

## 2023-01-17 PROCEDURE — 99999 PR PBB SHADOW E&M-EST. PATIENT-LVL IV: CPT | Mod: PBBFAC,,, | Performed by: OBSTETRICS & GYNECOLOGY

## 2023-01-17 PROCEDURE — G0101 CA SCREEN;PELVIC/BREAST EXAM: HCPCS | Mod: S$PBB,,, | Performed by: OBSTETRICS & GYNECOLOGY

## 2023-01-17 PROCEDURE — G0101 PR CA SCREEN;PELVIC/BREAST EXAM: ICD-10-PCS | Mod: S$PBB,,, | Performed by: OBSTETRICS & GYNECOLOGY

## 2023-01-17 NOTE — PROGRESS NOTES
Subjective:       Patient ID: Juan Antonio Mejia is a 77 y.o. female.    Chief Complaint:  Well Woman      History of Present Illness  HPI    77 y.o. female  here for annual.     Treated by Gyn Onc at [facility] for Paget's disease of the vulva.     Biopsy sites finally healed after long healing process.     Recently had the flu, was sick/fatigued for about a month.    She is postmenopausal.  denies break through bleeding.   denies vaginal itching or irritation.  denies vaginal discharge.    She is not sexually active.  She uses no method for contraception.    History of abnormal pap: No.  Last Pap: No longer indicated >age 65  Last MM2022 - normal   Last Colonoscopy:  - multiple polyps, repeat 3 years.   Last DXA: Declined, would not want treatment    Family History   Problem Relation Age of Onset    Colon cancer Mother 79    Glaucoma Mother     Cancer Mother         colon    Prostate cancer Father     Heart attack Father     Cancer Father         prostate    Heart disease Father         50's    Mental retardation Brother     Cancer Daughter         melanoma    Macular degeneration Paternal Aunt     Breast cancer Neg Hx     Diabetes Neg Hx     Stroke Neg Hx     Hyperlipidemia Neg Hx     Hypertension Neg Hx     Cataracts Neg Hx            GYN & OB History  No LMP recorded. Patient is perimenopausal.   Date of Last Pap: No result found    OB History    Para Term  AB Living   5       4 1   SAB IAB Ectopic Multiple Live Births                  # Outcome Date GA Lbr Tomas/2nd Weight Sex Delivery Anes PTL Lv   5             4 AB            3 AB            2 AB            1 AB                Review of Systems  Review of Systems   Constitutional:  Negative for chills, diaphoresis, fatigue and fever.   Respiratory:  Negative for cough and shortness of breath.    Cardiovascular:  Negative for chest pain and palpitations.   Gastrointestinal:  Negative for abdominal pain, constipation,  diarrhea, nausea and vomiting.   Genitourinary:  Negative for dysmenorrhea, dysuria, frequency, menorrhagia, menstrual problem, pelvic pain, vaginal bleeding, vaginal discharge and vaginal pain.   Musculoskeletal:  Negative for back pain and myalgias.   Integumentary:  Negative for rash, acne, breast mass, nipple discharge and breast skin changes.   Neurological:  Negative for headaches.   Psychiatric/Behavioral:  Negative for depression. The patient is not nervous/anxious.    Breast: Negative for mass, mastodynia, nipple discharge and skin changes        Objective:    Physical Exam:   Constitutional: She is oriented to person, place, and time. She appears well-developed and well-nourished. No distress.    HENT:   Head: Normocephalic and atraumatic.    Eyes: EOM are normal.    Neck: No thyromegaly present.     Pulmonary/Chest: Effort normal. She exhibits no mass and no tenderness. Right breast exhibits no inverted nipple, no mass, no nipple discharge, no skin change, no tenderness and no swelling. Left breast exhibits no inverted nipple, no mass, no nipple discharge, no skin change, no tenderness and no swelling. Breasts are symmetrical.        Abdominal: Soft. She exhibits no distension and no mass. There is no abdominal tenderness. There is no rebound and no guarding. No hernia.     Genitourinary:    Genitourinary Comments: Atrophic external female genitalia; vagina atrophic, normal; cervix normal, no masses; uterus small mobile nontender; no adnexal masses palpated; rectal deferred               Musculoskeletal: Normal range of motion and moves all extremeties.       Neurological: She is alert and oriented to person, place, and time.    Skin: Skin is warm. No rash noted.    Psychiatric: She has a normal mood and affect. Her behavior is normal. Judgment and thought content normal.        Assessment:        1. Well woman exam with routine gynecological exam    2. Encounter for screening for malignant neoplasm of  breast, unspecified screening modality    3. Paget's disease of vulva              Plan:      Juan Antonio was seen today for well woman.    Diagnoses and all orders for this visit:    Well woman exam with routine gynecological exam  - Pap guidelines discussed. Pap smears no longer indicated >65.   - Breast cancer screening - MMG ordered by PCP.   - Colon cancer screening - up to date.   - Bone density screening - patient declined.   - Contraception - N/A.  - STD screening - declined.    Encounter for screening for malignant neoplasm of breast, unspecified screening modality    Paget's disease of vulva      No orders of the defined types were placed in this encounter.      Follow up in about 1 year (around 1/17/2024) for annual.

## 2023-01-30 ENCOUNTER — TELEPHONE (OUTPATIENT)
Dept: DERMATOLOGY | Facility: CLINIC | Age: 78
End: 2023-01-30

## 2023-01-30 NOTE — TELEPHONE ENCOUNTER
----- Message from Yoana Muhammad sent at 1/30/2023  3:07 PM CST -----  Regarding: Reschedule  Contact: Pt @ [phone]  Pt is calling to reschedule appt. Asking for a call back

## 2023-03-15 ENCOUNTER — OFFICE VISIT (OUTPATIENT)
Dept: PRIMARY CARE CLINIC | Facility: CLINIC | Age: 78
End: 2023-03-15
Payer: MEDICARE

## 2023-03-15 VITALS
HEART RATE: 75 BPM | OXYGEN SATURATION: 98 % | HEIGHT: 67 IN | TEMPERATURE: 99 F | DIASTOLIC BLOOD PRESSURE: 60 MMHG | SYSTOLIC BLOOD PRESSURE: 124 MMHG | WEIGHT: 166 LBS | BODY MASS INDEX: 26.06 KG/M2 | RESPIRATION RATE: 18 BRPM

## 2023-03-15 DIAGNOSIS — F32.1 MAJOR DEPRESSIVE DISORDER, SINGLE EPISODE, MODERATE: ICD-10-CM

## 2023-03-15 DIAGNOSIS — R73.03 PREDIABETES: ICD-10-CM

## 2023-03-15 DIAGNOSIS — C51.9 PAGET'S DISEASE OF VULVA: ICD-10-CM

## 2023-03-15 DIAGNOSIS — Z79.899 OTHER LONG TERM (CURRENT) DRUG THERAPY: ICD-10-CM

## 2023-03-15 DIAGNOSIS — I65.23 BILATERAL CAROTID ARTERY STENOSIS: ICD-10-CM

## 2023-03-15 DIAGNOSIS — Z97.3 WEARS CONTACT LENSES: ICD-10-CM

## 2023-03-15 DIAGNOSIS — Z80.0 FAMILY HISTORY OF COLON CANCER IN MOTHER: ICD-10-CM

## 2023-03-15 DIAGNOSIS — J45.20 MILD INTERMITTENT ASTHMA WITHOUT COMPLICATION: ICD-10-CM

## 2023-03-15 DIAGNOSIS — K57.90 DIVERTICULOSIS: ICD-10-CM

## 2023-03-15 DIAGNOSIS — Z86.010 HISTORY OF COLON POLYPS: ICD-10-CM

## 2023-03-15 DIAGNOSIS — L65.9 LOSS OF HAIR: ICD-10-CM

## 2023-03-15 DIAGNOSIS — E78.5 DYSLIPIDEMIA: Primary | ICD-10-CM

## 2023-03-15 DIAGNOSIS — R10.31 RIGHT LOWER QUADRANT ABDOMINAL PAIN: ICD-10-CM

## 2023-03-15 DIAGNOSIS — E03.4 HYPOTHYROIDISM DUE TO ACQUIRED ATROPHY OF THYROID: ICD-10-CM

## 2023-03-15 DIAGNOSIS — I70.0 AORTIC ATHEROSCLEROSIS: ICD-10-CM

## 2023-03-15 DIAGNOSIS — Z85.828 HISTORY OF NONMELANOMA SKIN CANCER: ICD-10-CM

## 2023-03-15 PROCEDURE — 99215 OFFICE O/P EST HI 40 MIN: CPT | Mod: PBBFAC,PN | Performed by: FAMILY MEDICINE

## 2023-03-15 PROCEDURE — 99999 PR PBB SHADOW E&M-EST. PATIENT-LVL V: ICD-10-PCS | Mod: PBBFAC,,, | Performed by: FAMILY MEDICINE

## 2023-03-15 PROCEDURE — 99215 PR OFFICE/OUTPT VISIT, EST, LEVL V, 40-54 MIN: ICD-10-PCS | Mod: S$PBB,,, | Performed by: FAMILY MEDICINE

## 2023-03-15 PROCEDURE — 99999 PR PBB SHADOW E&M-EST. PATIENT-LVL V: CPT | Mod: PBBFAC,,, | Performed by: FAMILY MEDICINE

## 2023-03-15 PROCEDURE — 99215 OFFICE O/P EST HI 40 MIN: CPT | Mod: S$PBB,,, | Performed by: FAMILY MEDICINE

## 2023-03-15 RX ORDER — BIMATOPROST 3 UG/ML
SOLUTION TOPICAL
Qty: 3 ML | Refills: 6 | Status: SHIPPED | OUTPATIENT
Start: 2023-03-15

## 2023-03-15 NOTE — PROGRESS NOTES
"Subjective:       Patient ID: Juan Antonio Mejia is a 77 y.o. female.    Chief Complaint: Annual Exam    HPI  78 y/o female former smoker (quit in 1984) with Prediabetes, lower extremity edema, CAD/HLPD/atherosclerosis, Hypothyroidism, Insomnia, Sam/MDD, EMPD of vulva, family hx of colon cancer in mother/colon polyps/diverticulosis, asthma, environmental allergies is here to follow up for HLPD.     R lower quadrant "stabbing pain" off and on for over 4 years, occurs once every 4 months, its fleeting, not associated with meals or stooling, no fall or injury, she is not very active or doing any dedicated exercise, she denies f/n/v/d/constipation/cp/sob/urinary sx, occasional overflow incontinence, she has started doing kegals. She is trying to eat healthy. Sleeping well, wakes up rested. Mood is good.      No formal diagnosis of HTN, was placed on Spironolactone about 20 years ago for "fluid retention" taking 50 mg daily  Prediabetes: following with Dr. Ordaz, metformin XR 1000 mg am and pm, invokana 300 mg daily, Ozempic 1 mg but taking 0.5 mg weekly  CAD/carotid artery stenosis/HLPD/aortic atherosclerosis CT 12/2019: following with Dr. Williamson and Dr. Rao, Zetia 10 mg daily, Pravastatin 40 mg daily  Hypothyroidism: Dr. Ordaz in [place], levothyroxine 75 mcg dailty, Cytomel 5 mcg daily  Insomnia:following with psychiatry Dr. Zurita in [place] every 3 months, on ambien for over 20 years, Ambien 6.25 mg nightly  Situational anxiety and depression after the death of her : following with Dr. Zurita in [place], Fluoxetine 20 mg daily  GYN: following with Dr. Streeter, no longer getting dexa as she does not want to be on the medications, extramammary pagets of the vulva, vagifem, aldara prn, mmg 7/2022  Environmental allergies/asthma:off singulair, off advair; albuterol prn, flonase prn  Family history of colon cancer in mother, hx of colon polyps and diverticulosis, colonoscopy 4/2021 repeat 3 years  His of " "non-melanoma skin cancer/LSC: following with Dr. Murphy  Dental: following with [facility]  Eye exam utd  OTC: magnesium, started for constipation  Bilateral hearing aids       Review of Systems  see HPI  Objective:      /60 (BP Location: Right arm, Patient Position: Sitting, BP Method: Small (Manual))   Pulse 75   Temp 98.7 °F (37.1 °C) (Oral)   Resp 18   Ht 5' 7" (1.702 m)   Wt 75.3 kg (166 lb 0.1 oz)   SpO2 98%   BMI 26.00 kg/m²   Physical Exam  Vitals and nursing note reviewed.   Constitutional:       Appearance: She is well-developed.   HENT:      Head: Normocephalic and atraumatic.   Neck:      Thyroid: No thyromegaly.   Cardiovascular:      Rate and Rhythm: Normal rate and regular rhythm.      Heart sounds: Normal heart sounds.   Pulmonary:      Effort: Pulmonary effort is normal. No respiratory distress.      Breath sounds: Normal breath sounds.   Musculoskeletal:      Cervical back: Normal range of motion and neck supple.      Right lower leg: No edema.      Left lower leg: No edema.   Lymphadenopathy:      Cervical: No cervical adenopathy.   Skin:     General: Skin is warm and dry.   Neurological:      Mental Status: She is alert.       Assessment:       1. Dyslipidemia    2. Wears contact lenses    3. Prediabetes    4. Bilateral carotid artery stenosis    5. Diverticulosis    6. History of colon polyps    7. Family history of colon cancer in mother    8. Hypothyroidism due to acquired atrophy of thyroid    9. Paget's disease of vulva    10. Loss of hair    11. Other long term (current) drug therapy    12. Aortic atherosclerosis    13. Major depressive disorder, single episode, moderate    14. Right lower quadrant abdominal pain    15. History of nonmelanoma skin cancer    16. Mild intermittent asthma without complication          Plan:   Juan Antonio was seen today for annual exam.    Diagnoses and all orders for this visit:    Dyslipidemia  -     CBC Auto Differential; Future  -     " Comprehensive Metabolic Panel; Future  -     Lipid Panel; Future    Wears contact lenses    Prediabetes  -     CBC Auto Differential; Future  -     Comprehensive Metabolic Panel; Future  -     Hemoglobin A1C; Future  -     Vitamin B12; Future  -     Vitamin D; Future    Bilateral carotid artery stenosis  -     CBC Auto Differential; Future  -     Comprehensive Metabolic Panel; Future    Diverticulosis    History of colon polyps    Family history of colon cancer in mother    Hypothyroidism due to acquired atrophy of thyroid  -     TSH; Future  -     T4, Free; Future  -     T3; Future    Paget's disease of vulva    Loss of hair  -     bimatoprost (LATISSE) 0.03 % ophthalmic solution; Place one drop on applicator and apply evenly along the skin of the upper eyelid at base of eyelashes qhs; repeat for second eye    Other long term (current) drug therapy  -     Vitamin B12; Future  -     Vitamin D; Future    Aortic atherosclerosis    Major depressive disorder, single episode, moderate    Right lower quadrant abdominal pain  -     Ambulatory referral/consult to Gastroenterology; Future    History of nonmelanoma skin cancer    Mild intermittent asthma without complication    Time spent with this patient was over 40 minutes.  Patient was counseled for over 50% of the visit.

## 2023-03-19 ENCOUNTER — TELEPHONE (OUTPATIENT)
Dept: PRIMARY CARE CLINIC | Facility: CLINIC | Age: 78
End: 2023-03-19
Payer: MEDICARE

## 2023-03-19 PROBLEM — I70.0 AORTIC ATHEROSCLEROSIS: Status: ACTIVE | Noted: 2023-03-19

## 2023-03-19 PROBLEM — Z78.9 STATIN INTOLERANCE: Status: RESOLVED | Noted: 2019-06-18 | Resolved: 2023-03-19

## 2023-03-19 PROBLEM — Z85.828 HISTORY OF NONMELANOMA SKIN CANCER: Status: ACTIVE | Noted: 2023-03-19

## 2023-03-19 PROBLEM — F32.1 MAJOR DEPRESSIVE DISORDER, SINGLE EPISODE, MODERATE: Status: ACTIVE | Noted: 2023-03-19

## 2023-03-19 PROBLEM — R10.31 RIGHT LOWER QUADRANT ABDOMINAL PAIN: Status: ACTIVE | Noted: 2023-03-19

## 2023-03-20 ENCOUNTER — LAB VISIT (OUTPATIENT)
Dept: LAB | Facility: HOSPITAL | Age: 78
End: 2023-03-20
Attending: FAMILY MEDICINE
Payer: MEDICARE

## 2023-03-20 DIAGNOSIS — R73.03 PREDIABETES: ICD-10-CM

## 2023-03-20 DIAGNOSIS — I65.23 BILATERAL CAROTID ARTERY STENOSIS: ICD-10-CM

## 2023-03-20 DIAGNOSIS — E78.5 DYSLIPIDEMIA: ICD-10-CM

## 2023-03-20 LAB
ALBUMIN SERPL BCP-MCNC: 4 G/DL (ref 3.5–5.2)
ALP SERPL-CCNC: 80 U/L (ref 55–135)
ALT SERPL W/O P-5'-P-CCNC: 17 U/L (ref 10–44)
ANION GAP SERPL CALC-SCNC: 12 MMOL/L (ref 8–16)
AST SERPL-CCNC: 15 U/L (ref 10–40)
BASOPHILS # BLD AUTO: 0.03 K/UL (ref 0–0.2)
BASOPHILS NFR BLD: 0.3 % (ref 0–1.9)
BILIRUB SERPL-MCNC: 0.7 MG/DL (ref 0.1–1)
BUN SERPL-MCNC: 13 MG/DL (ref 8–23)
CALCIUM SERPL-MCNC: 9.7 MG/DL (ref 8.7–10.5)
CHLORIDE SERPL-SCNC: 105 MMOL/L (ref 95–110)
CHOLEST SERPL-MCNC: 123 MG/DL (ref 120–199)
CHOLEST/HDLC SERPL: 2.4 {RATIO} (ref 2–5)
CO2 SERPL-SCNC: 21 MMOL/L (ref 23–29)
CREAT SERPL-MCNC: 0.8 MG/DL (ref 0.5–1.4)
DIFFERENTIAL METHOD: ABNORMAL
EOSINOPHIL # BLD AUTO: 0.3 K/UL (ref 0–0.5)
EOSINOPHIL NFR BLD: 3.5 % (ref 0–8)
ERYTHROCYTE [DISTWIDTH] IN BLOOD BY AUTOMATED COUNT: 14.5 % (ref 11.5–14.5)
ERYTHROCYTE [DISTWIDTH] IN BLOOD BY AUTOMATED COUNT: 14.5 % (ref 11.5–14.5)
EST. GFR  (NO RACE VARIABLE): >60 ML/MIN/1.73 M^2
ESTIMATED AVG GLUCOSE: 120 MG/DL (ref 68–131)
GLUCOSE SERPL-MCNC: 103 MG/DL (ref 70–110)
HBA1C MFR BLD: 5.8 % (ref 4–5.6)
HCT VFR BLD AUTO: 48.5 % (ref 37–48.5)
HCT VFR BLD AUTO: 48.5 % (ref 37–48.5)
HDLC SERPL-MCNC: 51 MG/DL (ref 40–75)
HDLC SERPL: 41.5 % (ref 20–50)
HGB BLD-MCNC: 15.2 G/DL (ref 12–16)
HGB BLD-MCNC: 15.2 G/DL (ref 12–16)
IMM GRANULOCYTES # BLD AUTO: 0.04 K/UL (ref 0–0.04)
IMM GRANULOCYTES NFR BLD AUTO: 0.5 % (ref 0–0.5)
LDLC SERPL CALC-MCNC: 38.8 MG/DL (ref 63–159)
LYMPHOCYTES # BLD AUTO: 2.7 K/UL (ref 1–4.8)
LYMPHOCYTES NFR BLD: 31 % (ref 18–48)
MCH RBC QN AUTO: 26.9 PG (ref 27–31)
MCH RBC QN AUTO: 26.9 PG (ref 27–31)
MCHC RBC AUTO-ENTMCNC: 31.3 G/DL (ref 32–36)
MCHC RBC AUTO-ENTMCNC: 31.3 G/DL (ref 32–36)
MCV RBC AUTO: 86 FL (ref 82–98)
MCV RBC AUTO: 86 FL (ref 82–98)
MONOCYTES # BLD AUTO: 0.6 K/UL (ref 0.3–1)
MONOCYTES NFR BLD: 6.5 % (ref 4–15)
NEUTROPHILS # BLD AUTO: 5.1 K/UL (ref 1.8–7.7)
NEUTROPHILS NFR BLD: 58.2 % (ref 38–73)
NONHDLC SERPL-MCNC: 72 MG/DL
NRBC BLD-RTO: 0 /100 WBC
PLATELET # BLD AUTO: 253 K/UL (ref 150–450)
PLATELET # BLD AUTO: 253 K/UL (ref 150–450)
PMV BLD AUTO: 10.7 FL (ref 9.2–12.9)
PMV BLD AUTO: 10.7 FL (ref 9.2–12.9)
POTASSIUM SERPL-SCNC: 4.5 MMOL/L (ref 3.5–5.1)
PROT SERPL-MCNC: 7.1 G/DL (ref 6–8.4)
RBC # BLD AUTO: 5.66 M/UL (ref 4–5.4)
RBC # BLD AUTO: 5.66 M/UL (ref 4–5.4)
SODIUM SERPL-SCNC: 138 MMOL/L (ref 136–145)
T4 FREE SERPL-MCNC: 0.82 NG/DL (ref 0.71–1.51)
TRIGL SERPL-MCNC: 166 MG/DL (ref 30–150)
TSH SERPL DL<=0.005 MIU/L-ACNC: 0.78 UIU/ML (ref 0.4–4)
VIT B12 SERPL-MCNC: 313 PG/ML (ref 210–950)
WBC # BLD AUTO: 8.75 K/UL (ref 3.9–12.7)
WBC # BLD AUTO: 8.75 K/UL (ref 3.9–12.7)

## 2023-03-20 PROCEDURE — 36415 COLL VENOUS BLD VENIPUNCTURE: CPT | Mod: PN | Performed by: NURSE PRACTITIONER

## 2023-03-20 PROCEDURE — 82607 VITAMIN B-12: CPT | Performed by: NURSE PRACTITIONER

## 2023-03-20 PROCEDURE — 84439 ASSAY OF FREE THYROXINE: CPT | Performed by: NURSE PRACTITIONER

## 2023-03-20 PROCEDURE — 83036 HEMOGLOBIN GLYCOSYLATED A1C: CPT | Performed by: NURSE PRACTITIONER

## 2023-03-20 PROCEDURE — 80061 LIPID PANEL: CPT | Performed by: NURSE PRACTITIONER

## 2023-03-20 PROCEDURE — 84443 ASSAY THYROID STIM HORMONE: CPT | Performed by: NURSE PRACTITIONER

## 2023-03-20 PROCEDURE — 80053 COMPREHEN METABOLIC PANEL: CPT | Performed by: NURSE PRACTITIONER

## 2023-03-20 PROCEDURE — 85025 COMPLETE CBC W/AUTO DIFF WBC: CPT | Performed by: FAMILY MEDICINE

## 2023-03-29 ENCOUNTER — OFFICE VISIT (OUTPATIENT)
Dept: OPTOMETRY | Facility: CLINIC | Age: 78
End: 2023-03-29
Payer: MEDICARE

## 2023-03-29 DIAGNOSIS — H52.203 HYPEROPIA WITH ASTIGMATISM AND PRESBYOPIA, BILATERAL: ICD-10-CM

## 2023-03-29 DIAGNOSIS — H40.033 ANATOMICAL NARROW ANGLE, BILATERAL: ICD-10-CM

## 2023-03-29 DIAGNOSIS — R73.03 PREDIABETES: ICD-10-CM

## 2023-03-29 DIAGNOSIS — Z97.3 WEARS CONTACT LENSES: ICD-10-CM

## 2023-03-29 DIAGNOSIS — H52.03 HYPEROPIA WITH ASTIGMATISM AND PRESBYOPIA, BILATERAL: ICD-10-CM

## 2023-03-29 DIAGNOSIS — H52.4 HYPEROPIA WITH ASTIGMATISM AND PRESBYOPIA, BILATERAL: ICD-10-CM

## 2023-03-29 DIAGNOSIS — H25.13 NUCLEAR SCLEROSIS OF BOTH EYES: Primary | ICD-10-CM

## 2023-03-29 DIAGNOSIS — H04.123 DRY EYE SYNDROME OF BOTH EYES: ICD-10-CM

## 2023-03-29 PROCEDURE — 99999 PR PBB SHADOW E&M-EST. PATIENT-LVL III: ICD-10-PCS | Mod: PBBFAC,,, | Performed by: OPTOMETRIST

## 2023-03-29 PROCEDURE — 92014 PR EYE EXAM, EST PATIENT,COMPREHESV: ICD-10-PCS | Mod: S$PBB,,, | Performed by: OPTOMETRIST

## 2023-03-29 PROCEDURE — 99213 OFFICE O/P EST LOW 20 MIN: CPT | Mod: PBBFAC | Performed by: OPTOMETRIST

## 2023-03-29 PROCEDURE — 99999 PR PBB SHADOW E&M-EST. PATIENT-LVL III: CPT | Mod: PBBFAC,,, | Performed by: OPTOMETRIST

## 2023-03-29 PROCEDURE — 92014 COMPRE OPH EXAM EST PT 1/>: CPT | Mod: S$PBB,,, | Performed by: OPTOMETRIST

## 2023-03-29 RX ORDER — ZOLPIDEM TARTRATE 5 MG/1
TABLET ORAL
COMMUNITY
Start: 2023-03-12

## 2023-03-30 ENCOUNTER — PATIENT MESSAGE (OUTPATIENT)
Dept: CARDIOLOGY | Facility: CLINIC | Age: 78
End: 2023-03-30
Payer: MEDICARE

## 2023-03-30 NOTE — PROGRESS NOTES
HPI    Last eye exam was 2/17/22 with Dr. Lombardi.  Patient states having to use readers over SCL's and doesn't like having to   do that-vision fluctuates daily. Removes and replaces SCL daily. Had   difficulty removing SCL's due to dryness. Also has glare and halos around   lights at night. Occasionally sees floaters OU.  Patient denies diplopia, headaches, flashes, and pain.    Hemoglobin A1C       Date                     Value               Ref Range             Status                03/20/2023               5.8 (H)             4.0 - 5.6 %           Final                Last edited by Sissy Medina MA on 3/29/2023  3:23 PM.            Assessment /Plan     For exam results, see Encounter Report.    Nuclear sclerosis of both eyes    Anatomical narrow angle, bilateral    Prediabetes    Dry eye syndrome of both eyes    Hyperopia with astigmatism and presbyopia, bilateral    Wears contact lenses           Educated on cataracts and affects on vision.  Monitor.   PIs patent OU.  Family history of glaucoma mother.  Angles open OU.  IOPs stable OU.  No signs of glaucoma today.  Monitor yearly.  No retinopathy--monitor yearly.  BS control.    Recommend using artificial tears at least 2x/day OU.  5-6.  No rx given today.  Will recheck contact and reading rx after using artificial tears regularly.   Retina flat and intact OU--no holes, tears, breaks, or RDs.

## 2023-04-18 ENCOUNTER — OFFICE VISIT (OUTPATIENT)
Dept: OPTOMETRY | Facility: CLINIC | Age: 78
End: 2023-04-18
Payer: MEDICARE

## 2023-04-18 DIAGNOSIS — Z97.3 WEARS CONTACT LENSES: ICD-10-CM

## 2023-04-18 DIAGNOSIS — H52.203 HYPEROPIA WITH ASTIGMATISM AND PRESBYOPIA, BILATERAL: Primary | ICD-10-CM

## 2023-04-18 DIAGNOSIS — H52.4 HYPEROPIA WITH ASTIGMATISM AND PRESBYOPIA, BILATERAL: Primary | ICD-10-CM

## 2023-04-18 DIAGNOSIS — H52.03 HYPEROPIA WITH ASTIGMATISM AND PRESBYOPIA, BILATERAL: Primary | ICD-10-CM

## 2023-04-18 PROCEDURE — 99213 OFFICE O/P EST LOW 20 MIN: CPT | Mod: PBBFAC | Performed by: OPTOMETRIST

## 2023-04-18 PROCEDURE — 99999 PR PBB SHADOW E&M-EST. PATIENT-LVL III: CPT | Mod: PBBFAC,,, | Performed by: OPTOMETRIST

## 2023-04-18 PROCEDURE — 92499 PR CONTACT LENS F/U LEV 1: ICD-10-PCS | Mod: ,,, | Performed by: OPTOMETRIST

## 2023-04-18 PROCEDURE — 92499 UNLISTED OPH SVC/PROCEDURE: CPT | Mod: ,,, | Performed by: OPTOMETRIST

## 2023-04-18 PROCEDURE — 99999 PR PBB SHADOW E&M-EST. PATIENT-LVL III: ICD-10-PCS | Mod: PBBFAC,,, | Performed by: OPTOMETRIST

## 2023-04-18 RX ORDER — IMIQUIMOD 12.5 MG/.25G
CREAM TOPICAL
COMMUNITY
Start: 2023-04-03 | End: 2023-09-16

## 2023-04-18 NOTE — PROGRESS NOTES
HPI     Concerns About Ocular Health     Additional comments: 3 wk CHRIS ck           Comments    Last eye exam was 3/29/23 with Dr. Lombardi.  Patient states recently started topical chemotherapy and concerned about   possible side effects. Dryness seems better since using AT's but has   trouble remember to use them. Still having side by side doubling when   looking at golf flags at at distance while wearing SCL's.           Last edited by Sissy Medina MA on 4/18/2023  2:05 PM.            Assessment /Plan     For exam results, see Encounter Report.    Hyperopia with astigmatism and presbyopia, bilateral    Wears contact lenses          1-2.  Reading and contact lens rx given.  Continue with artificial tears through out the day.    RTC 1 year for routine exam.

## 2023-04-20 ENCOUNTER — PATIENT MESSAGE (OUTPATIENT)
Dept: OPTOMETRY | Facility: CLINIC | Age: 78
End: 2023-04-20
Payer: MEDICARE

## 2023-04-21 ENCOUNTER — PATIENT MESSAGE (OUTPATIENT)
Dept: CARDIOLOGY | Facility: CLINIC | Age: 78
End: 2023-04-21
Payer: MEDICARE

## 2023-04-27 DIAGNOSIS — H04.123 DRY EYE SYNDROME OF BOTH EYES: Primary | ICD-10-CM

## 2023-04-27 RX ORDER — PREDNISOLONE ACETATE 10 MG/ML
1 SUSPENSION/ DROPS OPHTHALMIC DAILY
Qty: 5 ML | Refills: 1 | Status: SHIPPED | OUTPATIENT
Start: 2023-04-27 | End: 2023-05-04

## 2023-04-27 NOTE — PROGRESS NOTES
Assessment /Plan     For exam results, see Encounter Report.    Dry eye syndrome of both eyes  -     prednisoLONE acetate (PRED FORTE) 1 % DrpS; Place 1 drop into both eyes once daily. for 7 days  Dispense: 5 mL; Refill: 1

## 2023-05-04 ENCOUNTER — PATIENT MESSAGE (OUTPATIENT)
Dept: CARDIOLOGY | Facility: CLINIC | Age: 78
End: 2023-05-04
Payer: MEDICARE

## 2023-05-04 DIAGNOSIS — E78.5 DYSLIPIDEMIA: ICD-10-CM

## 2023-05-04 DIAGNOSIS — Z78.9 STATIN INTOLERANCE: ICD-10-CM

## 2023-05-05 RX ORDER — PRAVASTATIN SODIUM 40 MG/1
40 TABLET ORAL DAILY
Qty: 90 TABLET | Refills: 3 | Status: SHIPPED | OUTPATIENT
Start: 2023-05-05

## 2023-05-12 ENCOUNTER — TELEPHONE (OUTPATIENT)
Dept: VASCULAR SURGERY | Facility: CLINIC | Age: 78
End: 2023-05-12
Payer: MEDICARE

## 2023-05-12 DIAGNOSIS — I65.23 BILATERAL CAROTID ARTERY STENOSIS: Primary | ICD-10-CM

## 2023-05-15 ENCOUNTER — OFFICE VISIT (OUTPATIENT)
Dept: OPTOMETRY | Facility: CLINIC | Age: 78
End: 2023-05-15
Payer: MEDICARE

## 2023-05-15 DIAGNOSIS — H52.4 HYPEROPIA WITH ASTIGMATISM AND PRESBYOPIA, BILATERAL: Primary | ICD-10-CM

## 2023-05-15 DIAGNOSIS — H52.203 HYPEROPIA WITH ASTIGMATISM AND PRESBYOPIA, BILATERAL: Primary | ICD-10-CM

## 2023-05-15 DIAGNOSIS — H10.13 ALLERGIC CONJUNCTIVITIS OF BOTH EYES: ICD-10-CM

## 2023-05-15 DIAGNOSIS — H52.03 HYPEROPIA WITH ASTIGMATISM AND PRESBYOPIA, BILATERAL: Primary | ICD-10-CM

## 2023-05-15 PROCEDURE — 99499 NO LOS: ICD-10-PCS | Mod: S$PBB,,, | Performed by: OPTOMETRIST

## 2023-05-15 PROCEDURE — 99213 OFFICE O/P EST LOW 20 MIN: CPT | Mod: PBBFAC | Performed by: OPTOMETRIST

## 2023-05-15 PROCEDURE — 99999 PR PBB SHADOW E&M-EST. PATIENT-LVL III: ICD-10-PCS | Mod: PBBFAC,,, | Performed by: OPTOMETRIST

## 2023-05-15 PROCEDURE — 99499 UNLISTED E&M SERVICE: CPT | Mod: S$PBB,,, | Performed by: OPTOMETRIST

## 2023-05-15 PROCEDURE — 99999 PR PBB SHADOW E&M-EST. PATIENT-LVL III: CPT | Mod: PBBFAC,,, | Performed by: OPTOMETRIST

## 2023-05-15 RX ORDER — BEPOTASTINE BESILATE 15 MG/ML
1 SOLUTION/ DROPS OPHTHALMIC 2 TIMES DAILY
Qty: 5 ML | Refills: 11 | Status: SHIPPED | OUTPATIENT
Start: 2023-05-15

## 2023-05-15 RX ORDER — EMPAGLIFLOZIN 25 MG/1
TABLET, FILM COATED ORAL
COMMUNITY
Start: 2023-04-21 | End: 2023-11-29

## 2023-05-15 NOTE — PROGRESS NOTES
HPI     Dry Eye     Additional comments: 1 month CHRIS ck           Comments    Last eye exam was 4/18/23 with Dr. Lombardi.  Patient states can't read with readers for over her SCL's-had them made at   [facility]. Hasn't need to use PF drops since Dr. Lombardi prescribed them.   Currently on topical chemo medication for 10 more weeks-was told it could   cause vision changes.  .          Last edited by Sissy Medina MA on 5/15/2023 11:17 AM.            Assessment /Plan     For exam results, see Encounter Report.    Hyperopia with astigmatism and presbyopia, bilateral    Allergic conjunctivitis of both eyes  -     bepotastine besilate (BEPREVE) 1.5 % Drop; Place 1 drop into both eyes 2 (two) times daily.  Dispense: 5 mL; Refill: 11           Reading rx given to wear over contacts--doctor's remake.   Bepreve refills sent to pharmacy.

## 2023-06-05 ENCOUNTER — TELEPHONE (OUTPATIENT)
Dept: OBSTETRICS AND GYNECOLOGY | Facility: CLINIC | Age: 78
End: 2023-06-05
Payer: MEDICARE

## 2023-06-06 ENCOUNTER — OFFICE VISIT (OUTPATIENT)
Dept: OBSTETRICS AND GYNECOLOGY | Facility: CLINIC | Age: 78
End: 2023-06-06
Payer: MEDICARE

## 2023-06-06 VITALS — WEIGHT: 171.75 LBS | HEIGHT: 67 IN | BODY MASS INDEX: 26.96 KG/M2

## 2023-06-06 DIAGNOSIS — C51.9 PAGET'S DISEASE OF VULVA: Primary | ICD-10-CM

## 2023-06-06 PROCEDURE — 99999 PR PBB SHADOW E&M-EST. PATIENT-LVL III: CPT | Mod: PBBFAC,,, | Performed by: OBSTETRICS & GYNECOLOGY

## 2023-06-06 PROCEDURE — 99212 OFFICE O/P EST SF 10 MIN: CPT | Mod: S$PBB,,, | Performed by: OBSTETRICS & GYNECOLOGY

## 2023-06-06 PROCEDURE — 99213 OFFICE O/P EST LOW 20 MIN: CPT | Mod: PBBFAC,PN | Performed by: OBSTETRICS & GYNECOLOGY

## 2023-06-06 PROCEDURE — 99212 PR OFFICE/OUTPT VISIT, EST, LEVL II, 10-19 MIN: ICD-10-PCS | Mod: S$PBB,,, | Performed by: OBSTETRICS & GYNECOLOGY

## 2023-06-06 PROCEDURE — 99999 PR PBB SHADOW E&M-EST. PATIENT-LVL III: ICD-10-PCS | Mod: PBBFAC,,, | Performed by: OBSTETRICS & GYNECOLOGY

## 2023-06-06 RX ORDER — SPIRONOLACTONE 50 MG/1
TABLET, FILM COATED ORAL
COMMUNITY
Start: 2023-05-23 | End: 2023-07-14

## 2023-06-06 NOTE — PROGRESS NOTES
Subjective:      Patient ID: Juan Antonio Mejia is a 78 y.o. female.    Chief Complaint:  Pictures of EMPD progress       History of Present Illness  HPI  77 y/o  presents for follow up of EMPD of the vulva. She is followed by Dr. Hollingsworth at [facility] and needs photographs of the areas to send to him. She started aldara cream 5x a week for the past 8 weeks. Initially started 2x per week then slowly increased, now using 5x per week. Reports intense vulvar itching.     GYN & OB History  No LMP recorded. Patient is perimenopausal.   Date of Last Pap: No result found    OB History    Para Term  AB Living   5       4 1   SAB IAB Ectopic Multiple Live Births                  # Outcome Date GA Lbr Tomas/2nd Weight Sex Delivery Anes PTL Lv   5             4 AB            3 AB            2 AB            1 AB                Review of Systems  Review of Systems   Constitutional:  Negative for chills, diaphoresis, fatigue and fever.   Respiratory:  Negative for cough and shortness of breath.    Cardiovascular:  Negative for chest pain and palpitations.   Gastrointestinal:  Negative for abdominal pain, constipation, diarrhea, nausea and vomiting.   Genitourinary:  Negative for dysmenorrhea, dyspareunia, menorrhagia, menstrual problem, pelvic pain, vaginal bleeding, vaginal discharge and vaginal pain.   Neurological:  Negative for headaches.   Psychiatric/Behavioral:  Negative for depression. The patient is not nervous/anxious.         Objective:     Physical Exam:   Constitutional: She is oriented to person, place, and time. She appears well-developed and well-nourished. No distress.    HENT:   Head: Normocephalic and atraumatic.       Pulmonary/Chest: Effort normal.          Genitourinary:    Genitourinary Comments: See photos attached             Musculoskeletal: Normal range of motion and moves all extremeties.       Neurological: She is alert and oriented to person, place, and time.      Psychiatric: She has a normal mood and affect. Her behavior is normal. Judgment and thought content normal.                                          Assessment:     1. Paget's disease of vulva            Plan:     Juan Antonio was seen today for pictures of empd progress .    Diagnoses and all orders for this visit:    Paget's disease of vulva  Continue current care with Dr. Hollingsworth    No orders of the defined types were placed in this encounter.      No follow-ups on file.

## 2023-06-20 ENCOUNTER — PATIENT MESSAGE (OUTPATIENT)
Dept: OBSTETRICS AND GYNECOLOGY | Facility: CLINIC | Age: 78
End: 2023-06-20
Payer: MEDICARE

## 2023-06-23 ENCOUNTER — PATIENT MESSAGE (OUTPATIENT)
Dept: OBSTETRICS AND GYNECOLOGY | Facility: CLINIC | Age: 78
End: 2023-06-23
Payer: MEDICARE

## 2023-06-28 ENCOUNTER — TELEPHONE (OUTPATIENT)
Dept: VASCULAR SURGERY | Facility: CLINIC | Age: 78
End: 2023-06-28
Payer: MEDICARE

## 2023-06-28 NOTE — TELEPHONE ENCOUNTER
Pt calling to reschedule cancelled appts with Dr. Williamson and vascular lab. Requesting 8/4/23. Appointments rescheduled, pt verified. Appointment letter refused.

## 2023-06-29 ENCOUNTER — PATIENT MESSAGE (OUTPATIENT)
Dept: OBSTETRICS AND GYNECOLOGY | Facility: CLINIC | Age: 78
End: 2023-06-29
Payer: MEDICARE

## 2023-07-04 ENCOUNTER — PATIENT MESSAGE (OUTPATIENT)
Dept: CARDIOLOGY | Facility: CLINIC | Age: 78
End: 2023-07-04
Payer: MEDICARE

## 2023-07-05 ENCOUNTER — TELEPHONE (OUTPATIENT)
Dept: CARDIOLOGY | Facility: CLINIC | Age: 78
End: 2023-07-05
Payer: MEDICARE

## 2023-07-05 DIAGNOSIS — E78.5 DYSLIPIDEMIA: Primary | ICD-10-CM

## 2023-07-11 ENCOUNTER — PATIENT MESSAGE (OUTPATIENT)
Dept: PRIMARY CARE CLINIC | Facility: CLINIC | Age: 78
End: 2023-07-11
Payer: MEDICARE

## 2023-07-11 DIAGNOSIS — Z12.39 ENCOUNTER FOR SCREENING FOR MALIGNANT NEOPLASM OF BREAST, UNSPECIFIED SCREENING MODALITY: Primary | ICD-10-CM

## 2023-07-14 ENCOUNTER — PATIENT MESSAGE (OUTPATIENT)
Dept: CARDIOLOGY | Facility: CLINIC | Age: 78
End: 2023-07-14
Payer: MEDICARE

## 2023-07-14 ENCOUNTER — OFFICE VISIT (OUTPATIENT)
Dept: OBSTETRICS AND GYNECOLOGY | Facility: CLINIC | Age: 78
End: 2023-07-14
Payer: MEDICARE

## 2023-07-14 VITALS
BODY MASS INDEX: 26.44 KG/M2 | WEIGHT: 168.44 LBS | DIASTOLIC BLOOD PRESSURE: 68 MMHG | HEIGHT: 67 IN | SYSTOLIC BLOOD PRESSURE: 110 MMHG

## 2023-07-14 DIAGNOSIS — C51.9 PAGET'S DISEASE OF VULVA: Primary | ICD-10-CM

## 2023-07-14 PROCEDURE — 99999 PR PBB SHADOW E&M-EST. PATIENT-LVL III: ICD-10-PCS | Mod: PBBFAC,,, | Performed by: OBSTETRICS & GYNECOLOGY

## 2023-07-14 PROCEDURE — 99212 PR OFFICE/OUTPT VISIT, EST, LEVL II, 10-19 MIN: ICD-10-PCS | Mod: S$PBB,,, | Performed by: OBSTETRICS & GYNECOLOGY

## 2023-07-14 PROCEDURE — 99212 OFFICE O/P EST SF 10 MIN: CPT | Mod: S$PBB,,, | Performed by: OBSTETRICS & GYNECOLOGY

## 2023-07-14 PROCEDURE — 99999 PR PBB SHADOW E&M-EST. PATIENT-LVL III: CPT | Mod: PBBFAC,,, | Performed by: OBSTETRICS & GYNECOLOGY

## 2023-07-14 PROCEDURE — 99213 OFFICE O/P EST LOW 20 MIN: CPT | Mod: PBBFAC,PN | Performed by: OBSTETRICS & GYNECOLOGY

## 2023-07-14 RX ORDER — SPIRONOLACTONE 50 MG/1
50 TABLET, FILM COATED ORAL DAILY
Qty: 90 TABLET | Refills: 3 | Status: SHIPPED | OUTPATIENT
Start: 2023-07-14 | End: 2023-07-19 | Stop reason: SDUPTHER

## 2023-07-14 NOTE — PROGRESS NOTES
Subjective:      Patient ID: Juan Antonio Mejia is a 78 y.o. female.    Chief Complaint:  Follow-up    History of Present Illness  HPI  79 y/o  presents for follow up of EMPD of the vulva. She is followed by Dr. Hollingsworth at [facility] and needs photographs of the areas to send to him. She has now completed 15 weeks of aldara cream and needs updated pictures to send to Dr. Hollingsworth.     GYN & OB History  No LMP recorded. Patient is perimenopausal.   Date of Last Pap: No result found    OB History    Para Term  AB Living   5       4 1   SAB IAB Ectopic Multiple Live Births                  # Outcome Date GA Lbr Tomas/2nd Weight Sex Delivery Anes PTL Lv   5             4 AB            3 AB            2 AB            1 AB                Review of Systems  Review of Systems   Constitutional:  Negative for chills, diaphoresis, fatigue and fever.   Respiratory:  Negative for cough and shortness of breath.    Cardiovascular:  Negative for chest pain and palpitations.   Gastrointestinal:  Negative for abdominal pain, constipation, diarrhea, nausea and vomiting.   Genitourinary:  Negative for dysmenorrhea, dyspareunia, menorrhagia, menstrual problem, pelvic pain, vaginal bleeding, vaginal discharge and vaginal pain.   Neurological:  Negative for headaches.   Psychiatric/Behavioral:  Negative for depression. The patient is not nervous/anxious.         Objective:     Physical Exam:   Constitutional: She is oriented to person, place, and time. She appears well-developed and well-nourished. No distress.    HENT:   Head: Normocephalic and atraumatic.       Pulmonary/Chest: Effort normal.          Genitourinary:    Genitourinary Comments: See photo             Musculoskeletal: Normal range of motion and moves all extremeties.       Neurological: She is alert and oriented to person, place, and time.     Psychiatric: She has a normal mood and affect. Her behavior is normal. Judgment and thought content normal.          Assessment:     1. Paget's disease of vulva              Plan:     Juan Antonio was seen today for pictures of empd progress .    Diagnoses and all orders for this visit:    Paget's disease of vulva  Patient gave verbal consent for picture to be obtained. It was sent to Dr. Hollingsworth.   Continue current care with Dr. Hollingsworth.    No orders of the defined types were placed in this encounter.      Follow up if symptoms worsen or fail to improve.

## 2023-07-19 RX ORDER — SPIRONOLACTONE 50 MG/1
50 TABLET, FILM COATED ORAL DAILY
Qty: 90 TABLET | Refills: 3 | Status: SHIPPED | OUTPATIENT
Start: 2023-07-19

## 2023-07-21 ENCOUNTER — OFFICE VISIT (OUTPATIENT)
Dept: DERMATOLOGY | Facility: CLINIC | Age: 78
End: 2023-07-21
Payer: MEDICARE

## 2023-07-21 DIAGNOSIS — L28.0 LSC (LICHEN SIMPLEX CHRONICUS): ICD-10-CM

## 2023-07-21 DIAGNOSIS — L72.0 MILIA: ICD-10-CM

## 2023-07-21 DIAGNOSIS — Z12.83 SKIN CANCER SCREENING: Primary | ICD-10-CM

## 2023-07-21 DIAGNOSIS — L91.8 ST (SKIN TAG): ICD-10-CM

## 2023-07-21 DIAGNOSIS — D22.9 MULTIPLE BENIGN NEVI: ICD-10-CM

## 2023-07-21 DIAGNOSIS — L30.4 INTERTRIGO: ICD-10-CM

## 2023-07-21 DIAGNOSIS — L81.4 LENTIGINES: ICD-10-CM

## 2023-07-21 DIAGNOSIS — D18.01 CHERRY ANGIOMA: ICD-10-CM

## 2023-07-21 DIAGNOSIS — L82.1 SK (SEBORRHEIC KERATOSIS): ICD-10-CM

## 2023-07-21 PROCEDURE — 99999 PR PBB SHADOW E&M-EST. PATIENT-LVL IV: ICD-10-PCS | Mod: PBBFAC,,, | Performed by: DERMATOLOGY

## 2023-07-21 PROCEDURE — 99214 PR OFFICE/OUTPT VISIT, EST, LEVL IV, 30-39 MIN: ICD-10-PCS | Mod: S$PBB,,, | Performed by: DERMATOLOGY

## 2023-07-21 PROCEDURE — 99999 PR PBB SHADOW E&M-EST. PATIENT-LVL IV: CPT | Mod: PBBFAC,,, | Performed by: DERMATOLOGY

## 2023-07-21 PROCEDURE — 99214 OFFICE O/P EST MOD 30 MIN: CPT | Mod: S$PBB,,, | Performed by: DERMATOLOGY

## 2023-07-21 PROCEDURE — 99214 OFFICE O/P EST MOD 30 MIN: CPT | Mod: PBBFAC | Performed by: DERMATOLOGY

## 2023-07-21 RX ORDER — TRIAMCINOLONE ACETONIDE 1 MG/G
CREAM TOPICAL
Qty: 30 G | Refills: 1 | Status: SHIPPED | OUTPATIENT
Start: 2023-07-21

## 2023-07-21 NOTE — PROGRESS NOTES
Subjective:      Patient ID:  Juan Antonio Mejia is a 78 y.o. female who presents for   Chief Complaint   Patient presents with    Skin Check     TBSE      Pt is a 79 y/o female presenting to the clinic for TBSE.  Pt was last seen on 05/04/2022 for a rash.  Pt has area of concern to her back, r-breast, and bra-line. She states the lesions in those area are itchy. She has been experiencing these symptoms for 10 months. Her symptoms wax and wane and do not fully resolve. She has tried many OTC creams and lotions, she could not recall the names.    Pt states she has a hx of extramammary Paget's disease in the vaginal area which is currently being treated by other providers.    Review of Systems   Skin:  Positive for itching and activity-related sunscreen use. Negative for rash, recent sunburn and wears hat.   Hematologic/Lymphatic: Does not bruise/bleed easily.     Objective:   Physical Exam   Constitutional: She appears well-developed and well-nourished. No distress.   HENT:   Mouth/Throat: Lips normal.    Eyes: Lids are normal.  No conjunctival no injection.   Neurological: She is alert and oriented to person, place, and time. She is not disoriented.   Psychiatric: She has a normal mood and affect.   Skin:   Areas Examined (abnormalities noted in diagram):   Scalp / Hair Palpated and Inspected  Head / Face Inspection Performed  Neck Inspection Performed  Chest / Axilla Inspection Performed  Abdomen Inspection Performed  Genitals / Buttocks / Groin Inspection Performed  Back Inspection Performed  RUE Inspected  LUE Inspection Performed  RLE Inspected  LLE Inspection Performed  Nails and Digits Inspection Performed                   Diagram Legend     Erythematous scaling macule/papule c/w actinic keratosis       Vascular papule c/w angioma      Pigmented verrucoid papule/plaque c/w seborrheic keratosis      Yellow umbilicated papule c/w sebaceous hyperplasia      Irregularly shaped tan macule c/w lentigo     1-2 mm  smooth white papules consistent with Milia      Movable subcutaneous cyst with punctum c/w epidermal inclusion cyst      Subcutaneous movable cyst c/w pilar cyst      Firm pink to brown papule c/w dermatofibroma      Pedunculated fleshy papule(s) c/w skin tag(s)      Evenly pigmented macule c/w junctional nevus     Mildly variegated pigmented, slightly irregular-bordered macule c/w mildly atypical nevus      Flesh colored to evenly pigmented papule c/w intradermal nevus       Pink pearly papule/plaque c/w basal cell carcinoma      Erythematous hyperkeratotic cursted plaque c/w SCC      Surgical scar with no sign of skin cancer recurrence      Open and closed comedones      Inflammatory papules and pustules      Verrucoid papule consistent consistent with wart     Erythematous eczematous patches and plaques     Dystrophic onycholytic nail with subungual debris c/w onychomycosis     Umbilicated papule    Erythematous-base heme-crusted tan verrucoid plaque consistent with inflamed seborrheic keratosis     Erythematous Silvery Scaling Plaque c/w Psoriasis     See annotation      Assessment / Plan:        Skin cancer screening  Total body skin examination performed today including at least 12 points as noted in physical examination.   Patient instructed in importance of daily broad spectrum sunscreen use with spf at least 30. Sun avoidance and topical protection/protective clothing discussed.  Extramammary Paget's disease in groin is being treated currently by other providers.    Multiple benign nevi  Benign-appearing nevi present on exam today. Reassurance provided. Periodically examine moles and return to clinic if any moles change or become symptomatic (bleeding, itching, pain, etc).    SK (seborrheic keratosis)  These are benign inherited growths without a malignant potential. Reassurance given to patient. No treatment is necessary.   Treatment of benign, asymptomatic lesions may be considered cosmetic.  Warned about  risk of hypo- or hyperpigmentation with treatment and risk of recurrence.    Lentigines  These are benign sun spots which should be monitored for changes. Patient instructed in importance of daily broad spectrum sunscreen use with spf at least 30. Sun avoidance and topical protection/protective clothing discussed.    Milia  This is a benign cyst. Reassurance provided. No treatment is necessary unless it is symptomatic. These may resolve on their own.    Cherry angioma  This is a benign vascular lesion. Reassurance given. No treatment required. Treatment of benign, asymptomatic lesions may be considered cosmetic.    ST (skin tag)  Reassurance given to patient. No treatment is necessary.   Treatment of benign, asymptomatic lesions may be considered cosmetic.    Intertrigo  -     triamcinolone acetonide 0.1% (KENALOG) 0.1 % SHAKE LOTION; Apply to affected areas BID after cool blow dry  Dispense: 30 g; Refill: 1    LSC (lichen simplex chronicus)  Improving. May continue TAC on L side x 1 week on, 1 week off until improved.    Follow up in about 1 year (around 7/21/2024) for skin check or sooner for any concerns.

## 2023-07-21 NOTE — PATIENT INSTRUCTIONS

## 2023-08-04 ENCOUNTER — LAB VISIT (OUTPATIENT)
Dept: LAB | Facility: HOSPITAL | Age: 78
End: 2023-08-04
Payer: MEDICARE

## 2023-08-04 ENCOUNTER — OFFICE VISIT (OUTPATIENT)
Dept: VASCULAR SURGERY | Facility: CLINIC | Age: 78
End: 2023-08-04
Payer: MEDICARE

## 2023-08-04 ENCOUNTER — HOSPITAL ENCOUNTER (OUTPATIENT)
Dept: VASCULAR SURGERY | Facility: CLINIC | Age: 78
Discharge: HOME | End: 2023-08-04
Attending: SURGERY
Payer: MEDICARE

## 2023-08-04 VITALS
WEIGHT: 167.56 LBS | HEART RATE: 82 BPM | DIASTOLIC BLOOD PRESSURE: 66 MMHG | BODY MASS INDEX: 26.3 KG/M2 | TEMPERATURE: 98 F | HEIGHT: 67 IN | SYSTOLIC BLOOD PRESSURE: 159 MMHG

## 2023-08-04 DIAGNOSIS — I65.23 BILATERAL CAROTID ARTERY STENOSIS: Primary | ICD-10-CM

## 2023-08-04 DIAGNOSIS — E78.5 DYSLIPIDEMIA: ICD-10-CM

## 2023-08-04 DIAGNOSIS — I65.23 BILATERAL CAROTID ARTERY STENOSIS: ICD-10-CM

## 2023-08-04 LAB
ALBUMIN SERPL BCP-MCNC: 3.9 G/DL (ref 3.5–5.2)
ALP SERPL-CCNC: 87 U/L (ref 55–135)
ALT SERPL W/O P-5'-P-CCNC: 20 U/L (ref 10–44)
ANION GAP SERPL CALC-SCNC: 9 MMOL/L (ref 8–16)
AST SERPL-CCNC: 16 U/L (ref 10–40)
BILIRUB SERPL-MCNC: 0.7 MG/DL (ref 0.1–1)
BUN SERPL-MCNC: 13 MG/DL (ref 8–23)
CALCIUM SERPL-MCNC: 9.8 MG/DL (ref 8.7–10.5)
CHLORIDE SERPL-SCNC: 106 MMOL/L (ref 95–110)
CHOLEST SERPL-MCNC: 142 MG/DL (ref 120–199)
CHOLEST/HDLC SERPL: 3 {RATIO} (ref 2–5)
CO2 SERPL-SCNC: 27 MMOL/L (ref 23–29)
CREAT SERPL-MCNC: 0.7 MG/DL (ref 0.5–1.4)
EST. GFR  (NO RACE VARIABLE): >60 ML/MIN/1.73 M^2
GLUCOSE SERPL-MCNC: 96 MG/DL (ref 70–110)
HDLC SERPL-MCNC: 47 MG/DL (ref 40–75)
HDLC SERPL: 33.1 % (ref 20–50)
LDLC SERPL CALC-MCNC: 62.8 MG/DL (ref 63–159)
NONHDLC SERPL-MCNC: 95 MG/DL
POTASSIUM SERPL-SCNC: 4.5 MMOL/L (ref 3.5–5.1)
PROT SERPL-MCNC: 7.1 G/DL (ref 6–8.4)
SODIUM SERPL-SCNC: 142 MMOL/L (ref 136–145)
TRIGL SERPL-MCNC: 161 MG/DL (ref 30–150)

## 2023-08-04 PROCEDURE — 93880 EXTRACRANIAL BILAT STUDY: CPT | Mod: PBBFAC | Performed by: SURGERY

## 2023-08-04 PROCEDURE — 99999 PR PBB SHADOW E&M-EST. PATIENT-LVL III: ICD-10-PCS | Mod: PBBFAC,,, | Performed by: SURGERY

## 2023-08-04 PROCEDURE — 80053 COMPREHEN METABOLIC PANEL: CPT | Performed by: INTERNAL MEDICINE

## 2023-08-04 PROCEDURE — 93880 PR DUPLEX SCAN EXTRACRANIAL,BILAT: ICD-10-PCS | Mod: 26,S$PBB,, | Performed by: SURGERY

## 2023-08-04 PROCEDURE — 99213 OFFICE O/P EST LOW 20 MIN: CPT | Mod: PBBFAC | Performed by: SURGERY

## 2023-08-04 PROCEDURE — 80061 LIPID PANEL: CPT | Performed by: INTERNAL MEDICINE

## 2023-08-04 PROCEDURE — 99213 OFFICE O/P EST LOW 20 MIN: CPT | Mod: S$PBB,,, | Performed by: SURGERY

## 2023-08-04 PROCEDURE — 36415 COLL VENOUS BLD VENIPUNCTURE: CPT | Performed by: INTERNAL MEDICINE

## 2023-08-04 PROCEDURE — 99213 PR OFFICE/OUTPT VISIT, EST, LEVL III, 20-29 MIN: ICD-10-PCS | Mod: S$PBB,,, | Performed by: SURGERY

## 2023-08-04 PROCEDURE — 93880 EXTRACRANIAL BILAT STUDY: CPT | Mod: 26,S$PBB,, | Performed by: SURGERY

## 2023-08-04 PROCEDURE — 99999 PR PBB SHADOW E&M-EST. PATIENT-LVL III: CPT | Mod: PBBFAC,,, | Performed by: SURGERY

## 2023-08-04 NOTE — PROGRESS NOTES
Your results look fine and do not require any change in treatment.     Please contact me if you have any additional concerns.    Sincerely,    Jeff Rao

## 2023-08-04 NOTE — PROGRESS NOTES
REFERRING PHYSICIAN:  Dr. Sergio Leal (vascular surgeon, [place]).    HISTORY OF PRESENT ILLNESS:  A 78-year-old female relocated from   [place] to [place], sent for evaluation of carotid artery disease.  She has   no history of stroke, TIA or amaurosis.  Recent CT scan done for other purposes   incidentally showed significant left common carotid artery disease, per history   stenotic versus occluded (I do not have these images).    3/23/2021:  I have not seen her in approximately 15 months.  She denies stroke TIA or amaurosis    6/21/2022:  This is a 15 month follow-up.  She denies interval stroke TIA amaurosis    08/04/2023:  This is a year follow-up.  She denies interval stroke TIA or amaurosis.  She is compliance with her aspirin and Zetia      PAST MEDICAL HISTORY:  1.  Extramammary Paget's disease.  2.  Diabetes.  3.  COVID vaccine x2    Past surgical history, appendectomy 12/20/2019    FAMILY HISTORY:  Positive for colon cancer.    SOCIAL HISTORY:  She is a nonsmoker.    MEDICATIONS:  Include aspirin 81 mg and Zetia.  Prior LDL is 47.6  See EPIC for full list.    ALLERGIES:  None.    PHYSICAL EXAMINATION:  VITAL SIGNS:  See nursing notes.  GENERAL:  She is in no acute distress.  RESPIRATORY:  Normal effort.  Clear to auscultation.  CARDIAC:  Regular rate and rhythm, nondisplaced PMI, no murmur.  VASCULAR:  2+ radial and brachial pulses.  ABDOMEN:  No masses or tenderness.  No hepatosplenomegaly.  Aorta cannot be   palpated.  EYES:  Normal conjunctivae and lids.  ENT:  Fair dentition.  NECK:  No JVD or thyromegaly.  MUSCULOSKELETAL:  No kyphosis or scoliosis.  EXTREMITIES:  Without clubbing or cyanosis.  SKIN:  Warm and dry.  NEUROLOGIC:  Alert and oriented x3.  Normal mood and affect.  Midline tongue.    No speech difficulty or hoarseness.  5/5 motor strength in all extremities.  Stable    IMAGING:  Minimal right carotid artery stenosis, peak systolic velocity 139(prior 136), end-diastolic 29...  Left  common carotid artery  occluded, with retrograde flow from the external to the internal carotid artery.      ASSESSMENT:  Stable Left common carotid artery occlusion, asymptomatic, with minimal contralateral stenosis on the right.  Clinically asymptomatic    RECOMMENDATIONS:  1.  Continue current medical treatment  2.  Follow-up in 1 year with repeat carotid duplex    QUINTON Williamson III, MD, FACS  Professor and Chief, Vascular and Endovascular Surgery    CC: Sergio Leal

## 2023-09-08 ENCOUNTER — PATIENT MESSAGE (OUTPATIENT)
Dept: PRIMARY CARE CLINIC | Facility: CLINIC | Age: 78
End: 2023-09-08
Payer: MEDICARE

## 2023-09-08 DIAGNOSIS — Z01.00 ROUTINE EYE EXAM: Primary | ICD-10-CM

## 2023-09-12 ENCOUNTER — OFFICE VISIT (OUTPATIENT)
Dept: CARDIOLOGY | Facility: CLINIC | Age: 78
End: 2023-09-12
Payer: MEDICARE

## 2023-09-12 DIAGNOSIS — R73.03 PREDIABETES: ICD-10-CM

## 2023-09-12 DIAGNOSIS — R93.1 AGATSTON CAC SCORE, >400: Primary | ICD-10-CM

## 2023-09-12 DIAGNOSIS — E78.5 DYSLIPIDEMIA: ICD-10-CM

## 2023-09-12 DIAGNOSIS — E78.2 MIXED HYPERLIPIDEMIA: ICD-10-CM

## 2023-09-12 DIAGNOSIS — I65.23 BILATERAL CAROTID ARTERY STENOSIS: ICD-10-CM

## 2023-09-12 DIAGNOSIS — E66.3 OVERWEIGHT (BMI 25.0-29.9): ICD-10-CM

## 2023-09-12 DIAGNOSIS — E03.4 HYPOTHYROIDISM DUE TO ACQUIRED ATROPHY OF THYROID: ICD-10-CM

## 2023-09-12 PROCEDURE — 99212 PR OFFICE/OUTPT VISIT, EST, LEVL II, 10-19 MIN: ICD-10-PCS | Mod: 95,,, | Performed by: INTERNAL MEDICINE

## 2023-09-12 PROCEDURE — 99212 OFFICE O/P EST SF 10 MIN: CPT | Mod: 95,,, | Performed by: INTERNAL MEDICINE

## 2023-09-12 NOTE — PROGRESS NOTES
Subjective:   Patient ID:  Juan Antonio Mejia is a 78 y.o. female who presents for follow-up of CAD risk    HPI:  The patient is here for CAD risk factors and high CAC.  The patient has no chest pain, SOB, TIA, palpitations, syncope or pre-syncope.Patient does not exercise-Just changed to virtual as resp infection        Review of Systems   Constitutional: Negative for chills, decreased appetite, diaphoresis, fever, malaise/fatigue, night sweats, weight gain and weight loss.   HENT:  Negative for congestion, hoarse voice, nosebleeds, sore throat and tinnitus.    Eyes:  Negative for blurred vision, double vision, vision loss in left eye, vision loss in right eye, visual disturbance and visual halos.   Cardiovascular:  Negative for chest pain, claudication, cyanosis, dyspnea on exertion, irregular heartbeat, leg swelling, near-syncope, orthopnea, palpitations, paroxysmal nocturnal dyspnea and syncope.   Respiratory:  Negative for cough, hemoptysis, shortness of breath, sleep disturbances due to breathing, snoring, sputum production and wheezing.    Endocrine: Negative for cold intolerance, heat intolerance, polydipsia, polyphagia and polyuria.   Hematologic/Lymphatic: Negative for adenopathy and bleeding problem. Does not bruise/bleed easily.   Skin:  Negative for color change, dry skin, flushing, itching, nail changes, poor wound healing, rash, skin cancer, suspicious lesions and unusual hair distribution.   Musculoskeletal:  Negative for arthritis, back pain, falls, gout, joint pain, joint swelling, muscle cramps, muscle weakness, myalgias and stiffness.   Gastrointestinal:  Negative for abdominal pain, anorexia, change in bowel habit, constipation, diarrhea, dysphagia, heartburn, hematemesis, hematochezia, melena and vomiting.   Genitourinary:  Negative for decreased libido, dysuria, hematuria, hesitancy and urgency.   Neurological:  Negative for excessive daytime sleepiness, dizziness, focal weakness, headaches,  light-headedness, loss of balance, numbness, paresthesias, seizures, sensory change, tremors, vertigo and weakness.   Psychiatric/Behavioral:  Negative for altered mental status, depression, hallucinations, memory loss, substance abuse and suicidal ideas. The patient does not have insomnia and is not nervous/anxious.    Allergic/Immunologic: Negative for environmental allergies and hives.       Objective: There were no vitals taken for this visit.     Physical Exam    Assessment:     1. Agatston CAC score, >400    2. Dyslipidemia    3. Prediabetes    4. Bilateral carotid artery stenosis    5. Overweight (BMI 25.0-29.9)    6. Hypothyroidism due to acquired atrophy of thyroid    7. Mixed hyperlipidemia        Plan:   Discussed diet , achieving and maintaining ideal body weight, and exercise.   We reviewed meds in detail.  Reassured-Discussed goals, options, plan.  Omega-3 > 800 mg/d combined EPA/DHA.  Goal BP< 130/80.  Goal LDL < 70.  We discussed home BPs as high with Randall recently  Diagnoses and all orders for this visit:    Agatston CAC score, >400  -     Lipid Panel; Future; Expected date: 09/12/2024  -     Comprehensive Metabolic Panel; Future; Expected date: 09/12/2024  -     CBC Auto Differential; Future; Expected date: 09/12/2024  -     EKG 12-lead; Future; Expected date: 09/12/2024  -     BNP; Future; Expected date: 09/12/2024    Dyslipidemia  -     Lipid Panel; Future; Expected date: 09/12/2024  -     Comprehensive Metabolic Panel; Future; Expected date: 09/12/2024  -     TSH; Future; Expected date: 09/12/2024  -     CBC Auto Differential; Future; Expected date: 09/12/2024    Prediabetes    Bilateral carotid artery stenosis    Overweight (BMI 25.0-29.9)  -     Lipid Panel; Future; Expected date: 09/12/2024  -     Comprehensive Metabolic Panel; Future; Expected date: 09/12/2024  -     EKG 12-lead; Future; Expected date: 09/12/2024  -     BNP; Future; Expected date: 09/12/2024    Hypothyroidism due to  acquired atrophy of thyroid  -     Comprehensive Metabolic Panel; Future; Expected date: 09/12/2024  -     TSH; Future; Expected date: 09/12/2024  -     T4, Free; Future; Expected date: 09/12/2024    Mixed hyperlipidemia  -     Lipid Panel; Future; Expected date: 09/12/2024  -     Comprehensive Metabolic Panel; Future; Expected date: 09/12/2024            Follow up in about 15 months (around 12/12/2024) for with ECG and labs.

## 2023-09-12 NOTE — PATIENT INSTRUCTIONS
Discussed diet , achieving and maintaining ideal body weight, and exercise.   We reviewed meds in detail.  Reassured-Discussed goals, options, plan.  Omega-3 > 800 mg/d combined EPA/DHA.  Goal BP< 130/80.  Goal LDL < 70.  We discussed home BPs as high with Randall recently

## 2023-09-16 ENCOUNTER — OFFICE VISIT (OUTPATIENT)
Dept: URGENT CARE | Facility: CLINIC | Age: 78
End: 2023-09-16
Payer: MEDICARE

## 2023-09-16 VITALS
HEIGHT: 67 IN | TEMPERATURE: 99 F | BODY MASS INDEX: 26.21 KG/M2 | SYSTOLIC BLOOD PRESSURE: 144 MMHG | WEIGHT: 167 LBS | RESPIRATION RATE: 18 BRPM | HEART RATE: 81 BPM | DIASTOLIC BLOOD PRESSURE: 68 MMHG | OXYGEN SATURATION: 96 %

## 2023-09-16 DIAGNOSIS — J01.90 ACUTE NON-RECURRENT SINUSITIS, UNSPECIFIED LOCATION: ICD-10-CM

## 2023-09-16 DIAGNOSIS — R09.81 SINUS CONGESTION: ICD-10-CM

## 2023-09-16 DIAGNOSIS — R05.9 COUGH, UNSPECIFIED TYPE: Primary | ICD-10-CM

## 2023-09-16 DIAGNOSIS — J20.9 ACUTE BRONCHITIS DUE TO INFECTION: ICD-10-CM

## 2023-09-16 LAB
CTP QC/QA: YES
SARS-COV-2 AG RESP QL IA.RAPID: NEGATIVE

## 2023-09-16 PROCEDURE — 87811 SARS-COV-2 COVID19 W/OPTIC: CPT | Mod: QW,S$GLB,, | Performed by: FAMILY MEDICINE

## 2023-09-16 PROCEDURE — 99214 OFFICE O/P EST MOD 30 MIN: CPT | Mod: S$GLB,,, | Performed by: FAMILY MEDICINE

## 2023-09-16 PROCEDURE — 87811 SARS CORONAVIRUS 2 ANTIGEN POCT, MANUAL READ: ICD-10-PCS | Mod: QW,S$GLB,, | Performed by: FAMILY MEDICINE

## 2023-09-16 PROCEDURE — 99214 PR OFFICE/OUTPT VISIT, EST, LEVL IV, 30-39 MIN: ICD-10-PCS | Mod: S$GLB,,, | Performed by: FAMILY MEDICINE

## 2023-09-16 RX ORDER — DOXYCYCLINE HYCLATE 100 MG
100 TABLET ORAL 2 TIMES DAILY
Qty: 14 TABLET | Refills: 0 | Status: SHIPPED | OUTPATIENT
Start: 2023-09-16 | End: 2023-09-23

## 2023-09-16 RX ORDER — BENZONATATE 200 MG/1
200 CAPSULE ORAL 3 TIMES DAILY PRN
Qty: 21 CAPSULE | Refills: 0 | Status: SHIPPED | OUTPATIENT
Start: 2023-09-16 | End: 2023-09-23

## 2023-09-16 NOTE — PROGRESS NOTES
"Subjective:      Patient ID: Juan Antonio Mejia is a 78 y.o. female.    Vitals:  height is 5' 7" (1.702 m) and weight is 75.8 kg (167 lb). Her temperature is 98.6 °F (37 °C). Her blood pressure is 144/68 (abnormal) and her pulse is 81. Her respiration is 18 and oxygen saturation is 96%.     Chief Complaint: Nasal Congestion (Chest congestion and feeling very unwell. - Entered by patient)    Patient presents with c/o cough, sore throat, running nose, sinus and chest congestion for   days. Pt denies fever, chills, CP, SOB, weakness/dizziness, N/V, diarrhea, abdominal pain, dysuria, loss of smell or taste.        Cough  This is a new problem. The current episode started in the past 7 days (5 days). The problem has been gradually worsening. The problem occurs every few minutes. Pertinent negatives include no chest pain. Nothing aggravates the symptoms. She has tried OTC inhaler and OTC cough suppressant for the symptoms. The treatment provided mild relief. Her past medical history is significant for asthma. There is no history of bronchitis or pneumonia.       Constitution: Positive for fatigue.   HENT:  Positive for sinus pressure.    Cardiovascular:  Negative for chest pain and palpitations.   Respiratory:  Positive for chest tightness and cough.       Objective:     Physical Exam   Constitutional: She is oriented to person, place, and time. She appears well-developed. She is cooperative.  Non-toxic appearance. She does not appear ill. No distress.   HENT:   Head: Normocephalic and atraumatic.   Ears:   Right Ear: Hearing, tympanic membrane, external ear and ear canal normal. impacted cerumen  Left Ear: Hearing, tympanic membrane, external ear and ear canal normal. impacted cerumen  Nose: Congestion present. No mucosal edema, rhinorrhea or nasal deformity. No epistaxis. Right sinus exhibits no maxillary sinus tenderness and no frontal sinus tenderness. Left sinus exhibits no maxillary sinus tenderness and no frontal sinus " tenderness.   Mouth/Throat: Uvula is midline, oropharynx is clear and moist and mucous membranes are normal. No trismus in the jaw. Normal dentition. No uvula swelling. No oropharyngeal exudate, posterior oropharyngeal edema or posterior oropharyngeal erythema.   Eyes: Conjunctivae and lids are normal. No scleral icterus.   Neck: Trachea normal and phonation normal. Neck supple. No edema present. No erythema present. No neck rigidity present.   Cardiovascular: Normal rate, regular rhythm, normal heart sounds and normal pulses.   No murmur heard.  Pulmonary/Chest: Effort normal and breath sounds normal. No stridor. No respiratory distress. She has no decreased breath sounds. She has no wheezes. She has no rhonchi. She has no rales.   Abdominal: Normal appearance. She exhibits no distension. There is no abdominal tenderness. There is no left CVA tenderness and no right CVA tenderness.   Musculoskeletal: Normal range of motion.         General: No deformity. Normal range of motion.   Neurological: She is alert, oriented to person, place, and time and at baseline. She exhibits normal muscle tone. Coordination normal.   Skin: Skin is warm, dry, intact, not diaphoretic and not pale.   Psychiatric: Her speech is normal and behavior is normal. Judgment and thought content normal.   Nursing note and vitals reviewed.      Assessment:     1. Cough, unspecified type    2. Sinus congestion    3. Acute bronchitis due to infection    4. Acute non-recurrent sinusitis, unspecified location        Plan:   Discussed exam findings/results/diagnosis/plan with patient in clinic. Advised to f/u with PCP within 2-5 days. ER precautions given if symptoms get any worse. All questions answered. Patient verbally understood and agreed with treatment plan.     Cough, unspecified type  -     SARS Coronavirus 2 Antigen, POCT Manual Read    Sinus congestion    Acute bronchitis due to infection    Acute non-recurrent sinusitis, unspecified  location    Other orders  -     doxycycline (VIBRA-TABS) 100 MG tablet; Take 1 tablet (100 mg total) by mouth 2 (two) times daily. for 7 days  Dispense: 14 tablet; Refill: 0  -     benzonatate (TESSALON) 200 MG capsule; Take 1 capsule (200 mg total) by mouth 3 (three) times daily as needed for Cough.  Dispense: 21 capsule; Refill: 0

## 2023-09-18 ENCOUNTER — PATIENT MESSAGE (OUTPATIENT)
Dept: CARDIOLOGY | Facility: CLINIC | Age: 78
End: 2023-09-18
Payer: MEDICARE

## 2023-09-18 DIAGNOSIS — R05.9 COUGH, UNSPECIFIED TYPE: ICD-10-CM

## 2023-09-18 DIAGNOSIS — J40 BRONCHITIS: Primary | ICD-10-CM

## 2023-09-18 DIAGNOSIS — R11.0 NAUSEA: ICD-10-CM

## 2023-09-18 NOTE — TELEPHONE ENCOUNTER
Pt asks can you review her UC note from 09/16/23. States shes coughing a lot. Currently taking Tessalon perles, Sudafed & Mucinex every 4 hours. With minimal relief

## 2023-09-18 NOTE — TELEPHONE ENCOUNTER
----- Message from Oscar Chong sent at 9/18/2023  1:03 PM CDT -----  Contact: Pt [phone]  She wants your recommendation for which [facility] to make the appointment from the referral with.     Thank you

## 2023-09-20 RX ORDER — PREDNISONE 20 MG/1
40 TABLET ORAL DAILY
Qty: 6 TABLET | Refills: 0 | Status: SHIPPED | OUTPATIENT
Start: 2023-09-20 | End: 2023-09-23

## 2023-09-20 RX ORDER — ONDANSETRON 4 MG/1
4 TABLET, FILM COATED ORAL EVERY 12 HOURS PRN
Qty: 15 TABLET | Refills: 0 | Status: SHIPPED | OUTPATIENT
Start: 2023-09-20 | End: 2023-11-29

## 2023-09-20 NOTE — TELEPHONE ENCOUNTER
No care due was identified.  [facility] Embedded Care Due Messages. Reference number: [id number].   9/20/2023 12:26:50 PM CDT

## 2023-09-20 NOTE — TELEPHONE ENCOUNTER
Follow up has been scheduled  Pt state that she will pass on the chest Xray and steroids   Pt is dealing with some nausea from the doxycycline  Would like to know if she can have something called In for nausea.  Prescription has been pended below if applicable

## 2023-09-20 NOTE — TELEPHONE ENCOUNTER
It looks like she has all the right medications, has she tried using the albuterol inhaler this may also help if she is having a coughing spasm, another thing we can do is a short course of steroids, and get a chest x-ray.    Please set her up for follow up with me asap

## 2023-10-01 ENCOUNTER — PATIENT MESSAGE (OUTPATIENT)
Dept: CARDIOLOGY | Facility: CLINIC | Age: 78
End: 2023-10-01
Payer: MEDICARE

## 2023-10-28 ENCOUNTER — PATIENT MESSAGE (OUTPATIENT)
Dept: CARDIOLOGY | Facility: CLINIC | Age: 78
End: 2023-10-28
Payer: MEDICARE

## 2023-10-30 ENCOUNTER — PATIENT MESSAGE (OUTPATIENT)
Dept: CARDIOLOGY | Facility: CLINIC | Age: 78
End: 2023-10-30
Payer: MEDICARE

## 2023-11-15 ENCOUNTER — PATIENT MESSAGE (OUTPATIENT)
Dept: CARDIOLOGY | Facility: CLINIC | Age: 78
End: 2023-11-15
Payer: MEDICARE

## 2023-11-20 ENCOUNTER — TELEPHONE (OUTPATIENT)
Dept: CARDIOLOGY | Facility: CLINIC | Age: 78
End: 2023-11-20
Payer: MEDICARE

## 2023-11-20 NOTE — TELEPHONE ENCOUNTER
----- Message from Em Deshpande sent at 11/20/2023  8:50 AM CST -----  Regarding: surg clr  Pt is calling to check the status of her surg clr and can be reached at [phone].    Thank you

## 2023-11-20 NOTE — TELEPHONE ENCOUNTER
"Returned pt's call -no answer.  Left msg on her vm telling her the clearance form has been faxed to  [facility] at "[facility]" at [phone].    Em GELLER Staff  Caller: Unspecified (Today,  8:50 AM)  Pt is calling to check the status of her surg clr and can be reached at [phone].    Thank you  "

## 2023-11-29 ENCOUNTER — OFFICE VISIT (OUTPATIENT)
Dept: CARDIOLOGY | Facility: CLINIC | Age: 78
End: 2023-11-29
Payer: MEDICARE

## 2023-11-29 ENCOUNTER — HOSPITAL ENCOUNTER (OUTPATIENT)
Dept: CARDIOLOGY | Facility: CLINIC | Age: 78
Discharge: HOME | End: 2023-11-29
Payer: MEDICARE

## 2023-11-29 VITALS
SYSTOLIC BLOOD PRESSURE: 129 MMHG | DIASTOLIC BLOOD PRESSURE: 62 MMHG | OXYGEN SATURATION: 97 % | BODY MASS INDEX: 25.85 KG/M2 | HEIGHT: 67 IN | HEART RATE: 77 BPM | WEIGHT: 164.69 LBS

## 2023-11-29 DIAGNOSIS — E78.5 DYSLIPIDEMIA: ICD-10-CM

## 2023-11-29 DIAGNOSIS — E66.3 OVERWEIGHT (BMI 25.0-29.9): ICD-10-CM

## 2023-11-29 DIAGNOSIS — R93.1 ELEVATED CORONARY ARTERY CALCIUM SCORE: ICD-10-CM

## 2023-11-29 DIAGNOSIS — R93.1 AGATSTON CAC SCORE, >400: ICD-10-CM

## 2023-11-29 DIAGNOSIS — Z01.818 PREOPERATIVE CLEARANCE: Primary | ICD-10-CM

## 2023-11-29 PROCEDURE — 99213 OFFICE O/P EST LOW 20 MIN: CPT | Mod: S$PBB,25,GC, | Performed by: STUDENT IN AN ORGANIZED HEALTH CARE EDUCATION/TRAINING PROGRAM

## 2023-11-29 PROCEDURE — 93010 ELECTROCARDIOGRAM REPORT: CPT | Mod: S$PBB,,, | Performed by: INTERNAL MEDICINE

## 2023-11-29 PROCEDURE — 93010 EKG 12-LEAD: ICD-10-PCS | Mod: S$PBB,,, | Performed by: INTERNAL MEDICINE

## 2023-11-29 PROCEDURE — 93005 ELECTROCARDIOGRAM TRACING: CPT | Mod: PBBFAC | Performed by: INTERNAL MEDICINE

## 2023-11-29 PROCEDURE — 99215 OFFICE O/P EST HI 40 MIN: CPT | Mod: PBBFAC | Performed by: STUDENT IN AN ORGANIZED HEALTH CARE EDUCATION/TRAINING PROGRAM

## 2023-11-29 PROCEDURE — 99999 PR PBB SHADOW E&M-EST. PATIENT-LVL V: ICD-10-PCS | Mod: PBBFAC,GC,, | Performed by: STUDENT IN AN ORGANIZED HEALTH CARE EDUCATION/TRAINING PROGRAM

## 2023-11-29 PROCEDURE — 99213 PR OFFICE/OUTPT VISIT, EST, LEVL III, 20-29 MIN: ICD-10-PCS | Mod: S$PBB,25,GC, | Performed by: STUDENT IN AN ORGANIZED HEALTH CARE EDUCATION/TRAINING PROGRAM

## 2023-11-29 PROCEDURE — 99999 PR PBB SHADOW E&M-EST. PATIENT-LVL V: CPT | Mod: PBBFAC,GC,, | Performed by: STUDENT IN AN ORGANIZED HEALTH CARE EDUCATION/TRAINING PROGRAM

## 2023-11-29 NOTE — PROGRESS NOTES
Your results look fine and do not require any change in treatment. ECG looks good    Please contact me if you have any additional concerns.    Sincerely,    Jeff Rao

## 2023-11-29 NOTE — PROGRESS NOTES
PCP - Tanisha Rodrigues MD  Referring Physician:     Subjective:   Patient ID:  Juan Antonio Mejia is a 78 y.o. y.o. female who presents for cardiac clearance for cataract surgery     Patient with known stable carotid artery disease and hyperlipidemia. She is compliant with her medications and denies any symptoms. Patient with goal BP and LDL.     Patient without chest pain , shortness of breath and no history of TIA    History:     Social History     Tobacco Use    Smoking status: Former     Current packs/day: 0.00     Average packs/day: 0.5 packs/day for 36.0 years (18.0 ttl pk-yrs)     Types: Cigarettes     Start date:      Quit date:      Years since quittin.9    Smokeless tobacco: Never   Substance Use Topics    Alcohol use: Yes     Comment: rare     Family History   Problem Relation Age of Onset    Colon cancer Mother 79    Glaucoma Mother     Cancer Mother         colon    Prostate cancer Father     Heart attack Father     Cancer Father         prostate    Heart disease Father         50's    Intellectual disability Brother     Cancer Daughter         melanoma    Macular degeneration Paternal Aunt     Breast cancer Neg Hx     Diabetes Neg Hx     Stroke Neg Hx     Hyperlipidemia Neg Hx     Hypertension Neg Hx     Cataracts Neg Hx        Meds:     Review of patient's allergies indicates:   Allergen Reactions    Latex, natural rubber     Penicillins        Current Outpatient Medications:     albuterol (VENTOLIN HFA) 90 mcg/actuation inhaler, Inhale 2 puffs into the lungs every 4 (four) hours as needed for Wheezing or Shortness of Breath. Rescue, Disp: 18 g, Rfl: 1    aspirin (ECOTRIN) 81 MG EC tablet, Take 81 mg by mouth once daily. , Disp: , Rfl:     bepotastine besilate (BEPREVE) 1.5 % Drop, Place 1 drop into both eyes 2 (two) times daily., Disp: 5 mL, Rfl: 11    bimatoprost (LATISSE) 0.03 % ophthalmic solution, Place one drop on applicator and apply evenly along the skin of the upper eyelid at  base of eyelashes qhs; repeat for second eye, Disp: 3 mL, Rfl: 6    biotin 10,000 mcg Cap, Take by mouth once daily., Disp: , Rfl:     ezetimibe (ZETIA) 10 mg tablet, TAKE ONE TABLET BY MOUTH ONE TIME DAILY, Disp: 90 tablet, Rfl: 3    fluconazole (DIFLUCAN) 150 MG Tab, Take 1 tablet weekly as needed for yeast., Disp: 20 tablet, Rfl: 0    FLUoxetine 20 MG capsule, 20 mg once daily. , Disp: , Rfl:     fluticasone propionate (FLONASE) 50 mcg/actuation nasal spray, 1 spray (50 mcg total) by Each Nostril route once daily., Disp: 16 g, Rfl: 11    fluticasone-salmeterol diskus inhaler 100-50 mcg, Inhale 1 puff into the lungs 2 (two) times daily. Controller, Disp: 180 each, Rfl: 0    glucosamine HCl (GLUCOSAMINE, BULK, MISC), 3,000 mg by Misc.(Non-Drug; Combo Route) route once daily., Disp: , Rfl:     INVOKANA 300 mg Tab tablet, 300 mg once daily. , Disp: , Rfl:     levothyroxine (SYNTHROID) 75 MCG tablet, Take 1 tablet (75 mcg total) by mouth before breakfast., Disp: 30 tablet, Rfl: 3    liothyronine (CYTOMEL) 5 MCG Tab, 5 mcg once daily. , Disp: , Rfl:     MAGNESIUM ORAL, Take 100 mg by mouth once daily., Disp: , Rfl:     metFORMIN (GLUCOPHAGE-XR) 500 MG 24 hr tablet, 500 mg 4 (four) times daily as needed. Pt is taking 2 tablets twice daily, Disp: , Rfl:     OZEMPIC 0.25 mg or 0.5 mg(2 mg/1.5 mL) PnIj, once a week. , Disp: , Rfl:     OZEMPIC 1 mg/dose (4 mg/3 mL), , Disp: , Rfl:     pravastatin (PRAVACHOL) 40 MG tablet, Take 1 tablet (40 mg total) by mouth once daily., Disp: 90 tablet, Rfl: 3    spironolactone (ALDACTONE) 50 MG tablet, Take 1 tablet (50 mg total) by mouth once daily., Disp: 90 tablet, Rfl: 3    triamcinolone acetonide 0.1% (KENALOG) 0.1 % cream, Apply to affected areas BID after cool blow dry, Disp: 30 g, Rfl: 1    vitamin D (VITAMIN D3) 1000 units Tab, Take 1,000 Units by mouth once daily., Disp: , Rfl:     YUVAFEM 10 mcg Tab, once daily. 2x a week, Disp: , Rfl:     zolpidem (AMBIEN) 5 MG Tab, , Disp: ,  "Rfl:     ondansetron (ZOFRAN) 4 MG tablet, Take 1 tablet (4 mg total) by mouth every 12 (twelve) hours as needed for Nausea., Disp: 15 tablet, Rfl: 0    zolpidem (AMBIEN CR) 6.25 MG CR tablet, Take 1 tablet (6.25 mg total) by mouth every evening., Disp: 30 tablet, Rfl: 0    Review of Systems   Constitutional: Negative.    HENT: Negative.     Eyes: Negative.    Respiratory: Negative.  Negative for shortness of breath.    Cardiovascular:  Negative for chest pain, palpitations, orthopnea, claudication, leg swelling and PND.   Gastrointestinal: Negative.    Genitourinary: Negative.    Musculoskeletal: Negative.    Skin: Negative.    Neurological: Negative.    Endo/Heme/Allergies: Negative.    Psychiatric/Behavioral: Negative.         Objective:   /62   Pulse 77   Ht 5' 7" (1.702 m)   Wt 74.7 kg (164 lb 10.9 oz)   SpO2 97%   BMI 25.79 kg/m²   Physical Exam  Constitutional:       Appearance: Normal appearance.   Cardiovascular:      Rate and Rhythm: Normal rate and regular rhythm.      Heart sounds: No murmur heard.  Pulmonary:      Effort: Pulmonary effort is normal.      Breath sounds: Normal breath sounds.   Musculoskeletal:      Right lower leg: No edema.      Left lower leg: No edema.   Neurological:      General: No focal deficit present.      Mental Status: She is oriented to person, place, and time.   Psychiatric:         Mood and Affect: Mood normal.         Labs:     Lab Results   Component Value Date     08/04/2023    K 4.5 08/04/2023     08/04/2023    CO2 27 08/04/2023    BUN 13 08/04/2023    CREATININE 0.7 08/04/2023    ANIONGAP 9 08/04/2023     Lab Results   Component Value Date    HGBA1C 5.8 (H) 03/20/2023     No results found for: "BNP", "BNPTRIAGEBLO"    Lab Results   Component Value Date    WBC 8.75 03/20/2023    WBC 8.75 03/20/2023    HGB 15.2 03/20/2023    HGB 15.2 03/20/2023    HCT 48.5 03/20/2023    HCT 48.5 03/20/2023     03/20/2023     03/20/2023    GRAN 5.1 " "03/20/2023    GRAN 58.2 03/20/2023     Lab Results   Component Value Date    CHOL 142 08/04/2023    HDL 47 08/04/2023    LDLCALC 62.8 (L) 08/04/2023    TRIG 161 (H) 08/04/2023       Lab Results   Component Value Date     08/04/2023    K 4.5 08/04/2023     08/04/2023    CO2 27 08/04/2023    BUN 13 08/04/2023    CREATININE 0.7 08/04/2023    ANIONGAP 9 08/04/2023     Lab Results   Component Value Date    HGBA1C 5.8 (H) 03/20/2023     No results found for: "BNP", "BNPTRIAGEBLO" Lab Results   Component Value Date    WBC 8.75 03/20/2023    WBC 8.75 03/20/2023    HGB 15.2 03/20/2023    HGB 15.2 03/20/2023    HCT 48.5 03/20/2023    HCT 48.5 03/20/2023     03/20/2023     03/20/2023    GRAN 5.1 03/20/2023    GRAN 58.2 03/20/2023     Lab Results   Component Value Date    CHOL 142 08/04/2023    HDL 47 08/04/2023    LDLCALC 62.8 (L) 08/04/2023    TRIG 161 (H) 08/04/2023                Cardiovascular Imaging:     Echo:   EF   Date Value Ref Range Status   07/14/2022 65 % Final       Stress test:   The test was stopped because the patient experienced fatigue.  During stress, the following significant arrhythmias were observed: occasional PVCs Rare Couplet..  The patient's exercise capacity was severely impaired.  The ECG portion of this study is negative for myocardial ischemia.  The left ventricle is normal in size with normal systolic function.  The estimated ejection fraction is 65%.  Normal left ventricular diastolic function.  Normal right ventricular size with normal right ventricular systolic function.  Intermediate central venous pressure (8 mmHg).  The estimated PA systolic pressure is 31 mmHg.  The stress echo portion of this study is negative for myocardial ischemia.      Assessment & Plan:     1. Preoperative clearance    2. Elevated coronary artery calcium score    3. Dyslipidemia        Plan:  Patient cleared for cataract surgery. Form completed and fax to ophthalmology.  -Continue " pravastatin and Zetia  -Continue ASA      Can follow up with Dr. Rao as scheduled   Signed:  Yokasta RUTLEDGE M.D.  Page # [phone]  Cardiovascular Fellow  [facility]

## 2024-02-21 ENCOUNTER — OFFICE VISIT (OUTPATIENT)
Dept: DERMATOLOGY | Facility: CLINIC | Age: 79
End: 2024-02-21
Payer: MEDICARE

## 2024-02-21 DIAGNOSIS — R20.2 NOTALGIA PARESTHETICA: Primary | ICD-10-CM

## 2024-02-21 DIAGNOSIS — L28.0 LSC (LICHEN SIMPLEX CHRONICUS): ICD-10-CM

## 2024-02-21 DIAGNOSIS — L82.1 SK (SEBORRHEIC KERATOSIS): ICD-10-CM

## 2024-02-21 DIAGNOSIS — L71.8 OCULAR ROSACEA: ICD-10-CM

## 2024-02-21 PROCEDURE — 99213 OFFICE O/P EST LOW 20 MIN: CPT | Mod: PBBFAC,PN | Performed by: DERMATOLOGY

## 2024-02-21 PROCEDURE — 99999 PR PBB SHADOW E&M-EST. PATIENT-LVL III: CPT | Mod: PBBFAC,,, | Performed by: DERMATOLOGY

## 2024-02-21 PROCEDURE — 99214 OFFICE O/P EST MOD 30 MIN: CPT | Mod: S$PBB,,, | Performed by: DERMATOLOGY

## 2024-02-21 RX ORDER — DOXYCYCLINE 100 MG/1
100 CAPSULE ORAL 2 TIMES DAILY
Qty: 28 CAPSULE | Refills: 0 | Status: SHIPPED | OUTPATIENT
Start: 2024-02-21

## 2024-02-21 RX ORDER — CAPSAICIN 0.75 MG/G
CREAM TOPICAL 3 TIMES DAILY
Qty: 57 G | Refills: 2 | Status: SHIPPED | OUTPATIENT
Start: 2024-02-21

## 2024-02-21 NOTE — PATIENT INSTRUCTIONS
XEROSIS (DRY SKIN)        Definition    Xerosis is the term for dry skin.  We all have a natural oil coating over our skin produced by the skin oil glands.  If this oil is removed, the skin becomes dry which can lead to cracking, which can lead to inflammation.  Xerosis is usually a long-term problem that recurs often, especially in the winter.    Cause    Long hot baths or showers can remove our natural oil and lead to xerosis.  One should never take more than one bath or shower a day and for no longer than ten minutes.  Use of harsh soaps such as Zest, Dial, and Ivory can worsen and cause xerosis.  Cold winter weather worsens xerosis because the amount of moisture contained in cold air is much less than the amount of moisture in warm air.    Treatment    Treatment is intended to restore the natural oil to your skin.  Keep the skin lubricated.    Do not take more than one bath or shower a day.  Use lukewarm water, not hot.  Hot water dries out the skin.    Use a gentle moisturizing soap such as Cetaphil soap, Oil of Olay, Dove, Basis, Ivory moisture care, Restoraderm cleanser.    When toweling dry, dont rub.  Blot the skin so there is still some water left on the skin.  You should apply a moisturizing cream to all of the skin such as Cerave cream, Cetaphil cream, Lipikar Artesia AP+ Intense Repair Moisturizing Cream or Restoraderm or Eucerin Original Formula cream.   Alpha hydroxyacid lotions, i.e., AmLactin, also work very well for preventing dry skin, but may burn when used on inflamed or reddened skin.    If you like to swim during the winter months, you should not use soap when getting out of the pool.  When you have finished swimming, rinse off the chlorine with cool to warm water.  If this will be the only shower of the day, then you may use Cetaphil or another mild soap to cleanse your skin.  After the shower, apply a moisturizing cream to all of the skin as above.        [address] [facility] [address]/ [phone] FAX/ [email or web address]     Sun Protection      The [facility] would like to remind you of the importance of sun protection all year round and particularly during the summer when the suns rays are the strongest. It has been proven that both acute and chronic sun exposure damages our cells and leads to skin cancer. Beyond skin cancer, the sun causes 90% of the symptoms of premature skin aging, including wrinkles, lentigines (brown spots), and thin, easily bruised skin. Proper sun protection can help prevent these unwanted conditions.    Many patients report that they dont go in the sun. It has been shown that the average person receives 18 hours of incidental sun exposure per week during activities such as walking through parking lots, driving, or sitting next to windows. This accumulates to several bad sunburns per year!    In choosing sunscreen, you want one that protects against both UVA and UVB rays (broad spectrum). It is recommended that you use one of SPF 30 or higher. It is important to apply the sunscreen about 20 minutes prior to sun exposure. Most sunscreens are chemical sunscreens and a reaction must take place in the skin so that they are effective. If they are applied and then you are immediately exposed to the sun or start sweating, this reaction has not had time to take place and you are therefore unprotected. Sunscreen needs to be reapplied every 2 hours if you are participating in water sports or sweating. We recommend Elta MD or CeraVe sunscreens for daily use; however there are many options and it is most important for you to find one that you will use on a consistent basis.    If you have sensitive skin, you may do best with a sunscreen that contains only physical blockers in the active ingredient section. The only physical blockers available in the [place] currently are titanium dioxide or zinc oxide. These are typically thicker and  harder to apply, however they afford very good protection. Neutrogena Sensitive Skin, Blue Lizard Sensitive Skin (pink top) or Neutrogena Pure and Free are popular ones.     Aside from sunscreen, clothes with UV protection (UPF), wide brimmed hats, and sunglasses are other means of sun protection that we recommend.      Based on a recent study (6/2021) and out of an abundance of caution, we are recommending that you AVOID the following sunscreens as they may contain the carcinogen, benzene:    Spray and gel sunscreens  Any [employer] or Wal[employer]reens brands as well as Max Block and TopCare brands   Neutrogena Ultra Sheer Dry-touch Water Resistant Sunscreen LOTION SPF 70   Neutrogena Sheer Zinc Dry-touch Face Sunscreen LOTION SPF 50   5.   Aveeno Baby Continuous Protection Sensitive Skin Sunscreen LOTION - Broad Spectrum SPF 50    Please note that Benzene is not an ingredient or the degradation product of any ingredient in any sunscreen. This study suggested that the findings are a result of contamination in the manufacturing process. At this point, we don't know how effectively Benzene gets through the skin, if it gets absorbed systemically, and what effects it may have.     We do know that ultraviolet radiation is a well-established carcinogen. Please use daily sun protection/avoidance and use of at least SPF 30, broad-spectrum sunscreen not listed above.                       [facility]  [facility]ER[facility] [address]  [facility] [address]  Dept: [phone]  Dept Fax: [phone]

## 2024-02-21 NOTE — PROGRESS NOTES
Subjective:      Patient ID:  Juan Antonio Mejia is a 78 y.o. female who presents for   Chief Complaint   Patient presents with    Consult     Skin Concerns      HPI  Pt is a 79 y/o female presenting to the clinic for a consultation.  She has concerns about the changes in skin located on her face.  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  She is not here for a skin check.     July 2022 - biopsy of L buttocks - showed spongiotic dermatitis. Ended up at wound care due to pain with sitting. Tried TAC which helped the best. Painful, less so itchy.  Has a hx of extramammary Paget's disease in the vaginal area which is the reason the biopsy above was done - to rule it out.    Also c/o itching irritation of eyes/eyelids. Has hx of rosacea.    Review of Systems   Skin:  Positive for activity-related sunscreen use. Negative for daily sunscreen use, recent sunburn and wears hat.   Hematologic/Lymphatic: Does not bruise/bleed easily.       Objective:   Physical Exam   Constitutional: She appears well-developed and well-nourished. No distress.   Neurological: She is alert and oriented to person, place, and time. She is not disoriented.   Psychiatric: She has a normal mood and affect.   Skin:   Areas Examined (abnormalities noted in diagram):   Head / Face Inspection Performed  Genitals / Buttocks / Groin Inspection Performed                 Diagram Legend     Erythematous scaling macule/papule c/w actinic keratosis       Vascular papule c/w angioma      Pigmented verrucoid papule/plaque c/w seborrheic keratosis      Yellow umbilicated papule c/w sebaceous hyperplasia      Irregularly shaped tan macule c/w lentigo     1-2 mm smooth white papules consistent with Milia      Movable subcutaneous cyst with punctum c/w epidermal inclusion cyst      Subcutaneous movable cyst c/w pilar cyst      Firm pink to brown papule c/w dermatofibroma      Pedunculated fleshy papule(s) c/w  skin tag(s)      Evenly pigmented macule c/w junctional nevus     Mildly variegated pigmented, slightly irregular-bordered macule c/w mildly atypical nevus      Flesh colored to evenly pigmented papule c/w intradermal nevus       Pink pearly papule/plaque c/w basal cell carcinoma      Erythematous hyperkeratotic cursted plaque c/w SCC      Surgical scar with no sign of skin cancer recurrence      Open and closed comedones      Inflammatory papules and pustules      Verrucoid papule consistent consistent with wart     Erythematous eczematous patches and plaques     Dystrophic onycholytic nail with subungual debris c/w onychomycosis     Umbilicated papule    Erythematous-base heme-crusted tan verrucoid plaque consistent with inflamed seborrheic keratosis     Erythematous Silvery Scaling Plaque c/w Psoriasis     See annotation      Assessment / Plan:        Notalgia paresthetica  -     capsicum 0.075% (ZOSTRIX) 0.075 % topical cream; Apply topically 3 (three) times daily. To affected area on back  Dispense: 57 g; Refill: 2  Ice prn flares, capsaicin as above  Pt declines gabapentin.    Ocular rosacea  -     doxycycline (MONODOX) 100 MG capsule; Take 1 capsule (100 mg total) by mouth 2 (two) times daily. Take with food and water. Remain upright x 1 hr after taking.  Dispense: 28 capsule; Refill: 0  Discussed benefits and risks of doxycyline therapy including but not limited to GI discomfort, esophageal irritation/ulceration, and increased sun sensitivity. Patient was counseled to take medicine with meals and at least 1 hour before lying down.     If improvement noticed, pt can message me and I'll send her a lower anti-inflammatory dose of doxy to take long-term.    SK (seborrheic keratosis)  These are benign inherited growths without a malignant potential. Reassurance given to patient. No treatment is necessary.   Treatment of benign, asymptomatic lesions may be considered cosmetic.  Warned about risk of hypo- or  hyperpigmentation with treatment and risk of recurrence.    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    Follow up in about 3 months (around 5/21/2024) for skin check or sooner for any concerns.

## 2024-02-27 DIAGNOSIS — Z00.00 ENCOUNTER FOR MEDICARE ANNUAL WELLNESS EXAM: ICD-10-CM

## 2024-03-12 DIAGNOSIS — J45.20 MILD INTERMITTENT ASTHMA WITHOUT COMPLICATION: ICD-10-CM

## 2024-03-12 NOTE — TELEPHONE ENCOUNTER
----- Message from Bina Templeton sent at 3/12/2024 10:33 AM CDT -----  Contact: Self/ [phone]  Requesting an RX refill or new RX.  Is this a refill or new RX: New  RX name and strength :  ADVAIR DISKUS 100-50 mcg/dose diskus inhaler [Pharmacy Med Name: Advair Diskus Inhalation Aerosol Powder Breath Activated 100-50 MCG/ACT]  Is this a 30 day or 90 day RX: 30  Pharmacy name and phone # :  [facility] # [id number] - [facility] [address] [facility]  [address] [place] [address]  Phone: [phone] Fax: [phone]       The doctors have asked that we provide their patients with the following 2 reminders -- prescription refills can take up to 72 hours, and a friendly reminder that in the future you can use your [facility] account to request refills:  pt stated that she needs the medication to be sent today

## 2024-03-12 NOTE — TELEPHONE ENCOUNTER
No care due was identified.  Health [facility] Embedded Care Due Messages. Reference number: [id number].   3/12/2024 10:31:43 AM CDT

## 2024-03-12 NOTE — TELEPHONE ENCOUNTER
Refill Routing Note   Medication(s) are not appropriate for processing by [facility] for the following reason(s):        No active prescription written by provider: Expiration Date: 09/09/22     OR action(s):  Defer               Appointments  past 12m or future 3m with PCP    Date Provider   Last Visit   3/15/2023 Tanisha Rodrigues MD   Next Visit   Visit date not found Tanisha Rodrigues MD   ED visits in past 90 days: 0        Note composed:4:43 PM 03/12/2024

## 2024-03-14 RX ORDER — FLUTICASONE PROPIONATE AND SALMETEROL 100; 50 UG/1; UG/1
1 POWDER RESPIRATORY (INHALATION) 2 TIMES DAILY
Qty: 180 EACH | Refills: 0 | Status: SHIPPED | OUTPATIENT
Start: 2024-03-14

## 2024-03-15 ENCOUNTER — TELEPHONE (OUTPATIENT)
Dept: PRIMARY CARE CLINIC | Facility: CLINIC | Age: 79
End: 2024-03-15
Payer: MEDICARE

## 2024-03-15 NOTE — TELEPHONE ENCOUNTER
Spoke with phar tech (eric)   Fluticasone inhaler is not covered by insurance   Would like to know if its okay to prescribe Symbicort

## 2024-03-15 NOTE — TELEPHONE ENCOUNTER
----- Message from Sheryl Raza sent at 3/15/2024  9:44 AM CDT -----  Contact: Davin/[facility] [phone]  Pharmacy is calling to clarify an RX.  RX name:  insurance does not cover fluticasone-salmeterol diskus inhaler 100-50 mcg   What do they need to clarify:  they do not cover diskus only cover HFA. Is is okay to change? Please let Davin know. Thanks  Comments:

## 2024-03-18 ENCOUNTER — TELEPHONE (OUTPATIENT)
Dept: DERMATOLOGY | Facility: CLINIC | Age: 79
End: 2024-03-18
Payer: MEDICARE

## 2024-03-18 DIAGNOSIS — J45.20 MILD INTERMITTENT ASTHMA WITHOUT COMPLICATION: ICD-10-CM

## 2024-03-18 RX ORDER — FLUTICASONE PROPIONATE AND SALMETEROL 100; 50 UG/1; UG/1
1 POWDER RESPIRATORY (INHALATION) 2 TIMES DAILY
Qty: 180 EACH | Refills: 0 | Status: CANCELLED | OUTPATIENT
Start: 2024-03-18

## 2024-03-18 NOTE — TELEPHONE ENCOUNTER
Scheduled pt with  for next Monday @11:15      Gladis       ----- Message from Jil Tavera sent at 3/18/2024  1:18 PM CDT -----  Regarding: Missed Call  Contact: [phone]  SAV STONE calling regarding Appointment Access  (message) for #Hi, pt missed a call from the office and would like a call back to discuss.  [phone]  Thank you.

## 2024-03-18 NOTE — TELEPHONE ENCOUNTER
No care due was identified.  Health [facility] Embedded Care Due Messages. Reference number: [id number].   3/18/2024 4:08:01 PM CDT

## 2024-03-18 NOTE — TELEPHONE ENCOUNTER
I left a voice mail stating the purpose of my call is to reschedule her appointment from today.  I let her know there is an opening this Wednesday 03/20/2024 at 10:15 AM.  I suggested calling the clinic back or sending Dr. Murphy's staff a message via the portal if she would like to accept the appointment on Wednesday.

## 2024-03-18 NOTE — TELEPHONE ENCOUNTER
----- Message from Candi Ross sent at 3/18/2024 11:07 AM CDT -----  Contact: [facility] [place] [phone]  Pharmacy is calling to clarify an RX.  RX name:  fluticasone-salmeterol diskus inhaler 100-50 mcg       What do they need to clarify:  not covered   Comments:     [facility] # [id number] - [address] [address]  Phone: [phone] Fax: [phone]        Change to deborah Kate advar HFA

## 2024-03-19 RX ORDER — BUDESONIDE AND FORMOTEROL FUMARATE DIHYDRATE 160; 4.5 UG/1; UG/1
2 AEROSOL RESPIRATORY (INHALATION) EVERY 12 HOURS
COMMUNITY

## 2024-03-19 NOTE — TELEPHONE ENCOUNTER
----- Message from Bina Templeton sent at 3/19/2024 12:51 PM CDT -----  Contact: [facility]/Yvette/  Pharmacy is calling to clarify an RX.  RX name:  Symbicort   What do they need to clarify:  strength   Comments:

## 2024-03-19 NOTE — TELEPHONE ENCOUNTER
Spoke with vishal from [facility] pharmacy  [facility] has een called in for pt

## 2024-03-19 NOTE — TELEPHONE ENCOUNTER
Spoke with eric from [facility] pharmacy   Pt inhaler has now been changed to Symbicort   Med has been updated in med list

## 2024-03-22 DIAGNOSIS — Z78.0 MENOPAUSE: ICD-10-CM

## 2024-03-25 ENCOUNTER — TELEPHONE (OUTPATIENT)
Dept: PRIMARY CARE CLINIC | Facility: CLINIC | Age: 79
End: 2024-03-25
Payer: MEDICARE

## 2024-03-25 NOTE — TELEPHONE ENCOUNTER
----- Message from Nhung Mora sent at 3/25/2024  2:57 PM CDT -----  Contact: Mobile  [phone]  Patient said that [facility] is sending you a Prior Authorization form for Advair.      Patient would like for you to sing the form and send it back to [facility].

## 2024-03-25 NOTE — TELEPHONE ENCOUNTER
----- Message from Jenna Oswald sent at 3/25/2024  3:58 PM CDT -----  Contact: [facility] pharmacy /[phone] reference # [id number]  [facility] called in regards prior authorization on rx Advair. Please call and advise. Thank you.

## 2024-03-26 ENCOUNTER — TELEPHONE (OUTPATIENT)
Dept: DERMATOLOGY | Facility: CLINIC | Age: 79
End: 2024-03-26
Payer: MEDICARE

## 2024-05-21 ENCOUNTER — OFFICE VISIT (OUTPATIENT)
Dept: DERMATOLOGY | Facility: CLINIC | Age: 79
End: 2024-05-21
Payer: MEDICARE

## 2024-05-21 DIAGNOSIS — L82.0 INFLAMED SEBORRHEIC KERATOSIS: Primary | ICD-10-CM

## 2024-05-21 PROCEDURE — 99499 UNLISTED E&M SERVICE: CPT | Mod: S$GLB,,, | Performed by: DERMATOLOGY

## 2024-05-21 PROCEDURE — 17110 DESTRUCTION B9 LES UP TO 14: CPT | Mod: S$GLB,,, | Performed by: DERMATOLOGY

## 2024-05-21 NOTE — PATIENT INSTRUCTIONS
CRYOSURGERY      Your doctor has used a method called cryosurgery to treat your skin condition. Cryosurgery refers to the use of very cold substances to treat a variety of skin conditions such as warts, precancerous skin lesions, molluscum contagiosum, sun spots, and several benign growths. The substance we use in cryosurgery is liquid nitrogen and is so cold (-195 degrees Celsius) that it burns when administered.     Following treatment in the office, the skin may immediately itch or burn and become red. You may find the area around the lesion is affected as well. It is sometimes necessary to treat not only the lesion, but also a small area of the surrounding normal skin to achieve a good response.     A blister, and even a blood-filled blister, may form after treatment.   This is a normal response. If the blister is painful, it is acceptable to sterilize a needle with rubbing alcohol and gently pop the blister. It is important that you gently wash the area with soap and warm water as the blister fluid may contain wart virus if a wart was treated. Do not remove the roof of the blister.     The area treated can take anywhere from 1-3 weeks to heal. Healing time depends on the kind of skin lesion treated, the location, and how aggressively the lesion was treated. It is recommended that the areas treated are covered with Vaseline and a bandaid to improve healing. If a bandaid is not practical, Vaseline applied several times per day will do. Keeping these areas moist will speed the healing time.    Treatment with liquid nitrogen can leave a scar. In dark skin, it may be a light or dark scar; in light skin it may be a white or pink scar. These will generally fade with time, but may never go away completely.     If you have any concerns after your treatment, please message us via [facility] or call us at [phone].

## 2024-05-21 NOTE — PROGRESS NOTES
Patient Information  Name: Juan Antonio Mejia  : 1945  MRN: [id number]     Referring Physician:  No ref. provider found   Primary Care Physician:  Tanisha Rodrigues MD   Date of Visit: 2024      Subjective:     History of Present lllness:    Juan Antonio Mejia is a 79 y.o. female who presents with a chief complaint of lesion.  Location: neck and torso  Duration: months  Signs/Symptoms: can get very dry, irritated, itching, painful  Exacerbating factors: pulled and rubbed by clothing/bra  Relieving factors/Prior treatments: none    Patient was last seen: 2021.  Prior notes by myself reviewed.   Clinical documentation obtained by nursing staff reviewed.    Review of Systems    Objective:   Physical Exam   Constitutional: She appears well-developed and well-nourished. No distress.   Neurological: She is alert and oriented to person, place, and time. She is not disoriented.   Psychiatric: She has a normal mood and affect.   Skin:   Areas Examined (abnormalities noted in diagram):   Neck Inspection Performed  Chest / Axilla Inspection Performed  Abdomen Inspection Performed  Back Inspection Performed            Diagram Legend     Erythematous scaling macule/papule c/w actinic keratosis       Vascular papule c/w angioma      Pigmented verrucoid papule/plaque c/w seborrheic keratosis      Yellow umbilicated papule c/w sebaceous hyperplasia      Irregularly shaped tan macule c/w lentigo     1-2 mm smooth white papules consistent with Milia      Movable subcutaneous cyst with punctum c/w epidermal inclusion cyst      Subcutaneous movable cyst c/w pilar cyst      Firm pink to brown papule c/w dermatofibroma      Pedunculated fleshy papule(s) c/w skin tag(s)      Evenly pigmented macule c/w junctional nevus     Mildly variegated pigmented, slightly irregular-bordered macule c/w mildly atypical nevus      Flesh colored to evenly pigmented papule c/w intradermal nevus       Pink pearly papule/plaque c/w basal cell  carcinoma      Erythematous hyperkeratotic cursted plaque c/w SCC      Surgical scar with no sign of skin cancer recurrence      Open and closed comedones      Inflammatory papules and pustules      Verrucoid papule consistent consistent with wart     Erythematous eczematous patches and plaques     Dystrophic onycholytic nail with subungual debris c/w onychomycosis     Umbilicated papule    Erythematous-base heme-crusted tan verrucoid plaque consistent with inflamed seborrheic keratosis     Erythematous Silvery Scaling Plaque c/w Psoriasis     See annotation    No images are attached to the encounter or orders placed in the encounter.      [] Data reviewed  [] Prior external notes reviewed  [] Independent review of test  [] Management discussed with another provider  [] Independent historian    Assessment / Plan:        There are no diagnoses linked to this encounter.         No follow-ups on file.      Dana Jones MD, FAAD  [facility]

## 2024-05-23 ENCOUNTER — PATIENT MESSAGE (OUTPATIENT)
Dept: CARDIOLOGY | Facility: CLINIC | Age: 79
End: 2024-05-23
Payer: MEDICARE

## 2024-05-23 DIAGNOSIS — Z78.9 STATIN INTOLERANCE: ICD-10-CM

## 2024-05-23 DIAGNOSIS — E78.5 DYSLIPIDEMIA: ICD-10-CM

## 2024-05-23 RX ORDER — PRAVASTATIN SODIUM 40 MG/1
40 TABLET ORAL DAILY
Qty: 90 TABLET | Refills: 3 | OUTPATIENT
Start: 2024-05-23

## 2024-05-23 RX ORDER — PRAVASTATIN SODIUM 40 MG/1
40 TABLET ORAL
Qty: 90 TABLET | Refills: 3 | Status: SHIPPED | OUTPATIENT
Start: 2024-05-23

## 2024-05-23 RX ORDER — PRAVASTATIN SODIUM 40 MG/1
40 TABLET ORAL DAILY
Qty: 90 TABLET | Refills: 3 | Status: SHIPPED | OUTPATIENT
Start: 2024-05-23

## 2024-05-28 ENCOUNTER — PATIENT MESSAGE (OUTPATIENT)
Dept: CARDIOLOGY | Facility: CLINIC | Age: 79
End: 2024-05-28
Payer: MEDICARE

## 2024-05-29 NOTE — TELEPHONE ENCOUNTER
Pt was updated on continuing w. the 20 mg pravastatin and 10 mg ezetimibe and verbalized understanding.

## 2024-07-05 ENCOUNTER — LAB VISIT (OUTPATIENT)
Dept: LAB | Facility: HOSPITAL | Age: 79
End: 2024-07-05
Payer: MEDICARE

## 2024-07-05 DIAGNOSIS — R73.01 IMPAIRED FASTING GLUCOSE: Primary | ICD-10-CM

## 2024-07-05 DIAGNOSIS — R73.03 DIABETES MELLITUS, LATENT: ICD-10-CM

## 2024-07-05 DIAGNOSIS — E88.810 DYSMETABOLIC SYNDROME X: ICD-10-CM

## 2024-07-05 LAB
ALBUMIN SERPL BCP-MCNC: 3.8 G/DL (ref 3.5–5.2)
ALP SERPL-CCNC: 86 U/L (ref 55–135)
ALT SERPL W/O P-5'-P-CCNC: 23 U/L (ref 10–44)
ANION GAP SERPL CALC-SCNC: 9 MMOL/L (ref 8–16)
AST SERPL-CCNC: 17 U/L (ref 10–40)
BASOPHILS # BLD AUTO: 0.04 K/UL (ref 0–0.2)
BASOPHILS NFR BLD: 0.5 % (ref 0–1.9)
BILIRUB SERPL-MCNC: 0.6 MG/DL (ref 0.1–1)
BUN SERPL-MCNC: 14 MG/DL (ref 8–23)
CALCIUM SERPL-MCNC: 10.2 MG/DL (ref 8.7–10.5)
CHLORIDE SERPL-SCNC: 104 MMOL/L (ref 95–110)
CHOLEST SERPL-MCNC: 149 MG/DL (ref 120–199)
CHOLEST/HDLC SERPL: 2.8 {RATIO} (ref 2–5)
CO2 SERPL-SCNC: 26 MMOL/L (ref 23–29)
CREAT SERPL-MCNC: 0.9 MG/DL (ref 0.5–1.4)
CRP SERPL-MCNC: 0.8 MG/L (ref 0–8.2)
DIFFERENTIAL METHOD BLD: ABNORMAL
EOSINOPHIL # BLD AUTO: 0.5 K/UL (ref 0–0.5)
EOSINOPHIL NFR BLD: 6.5 % (ref 0–8)
ERYTHROCYTE [DISTWIDTH] IN BLOOD BY AUTOMATED COUNT: 14.1 % (ref 11.5–14.5)
EST. GFR  (NO RACE VARIABLE): >60 ML/MIN/1.73 M^2
ESTIMATED AVG GLUCOSE: 117 MG/DL (ref 68–131)
GLUCOSE SERPL-MCNC: 93 MG/DL (ref 70–110)
HBA1C MFR BLD: 5.7 % (ref 4–5.6)
HCT VFR BLD AUTO: 48.3 % (ref 37–48.5)
HDLC SERPL-MCNC: 54 MG/DL (ref 40–75)
HDLC SERPL: 36.2 % (ref 20–50)
HGB BLD-MCNC: 15.2 G/DL (ref 12–16)
IMM GRANULOCYTES # BLD AUTO: 0.04 K/UL (ref 0–0.04)
IMM GRANULOCYTES NFR BLD AUTO: 0.5 % (ref 0–0.5)
INSULIN COLLECTION INTERVAL: NORMAL
INSULIN SERPL-ACNC: 8.8 UU/ML
LDLC SERPL CALC-MCNC: 64 MG/DL (ref 63–159)
LYMPHOCYTES # BLD AUTO: 2 K/UL (ref 1–4.8)
LYMPHOCYTES NFR BLD: 24.7 % (ref 18–48)
MCH RBC QN AUTO: 27 PG (ref 27–31)
MCHC RBC AUTO-ENTMCNC: 31.5 G/DL (ref 32–36)
MCV RBC AUTO: 86 FL (ref 82–98)
MONOCYTES # BLD AUTO: 0.5 K/UL (ref 0.3–1)
MONOCYTES NFR BLD: 6.8 % (ref 4–15)
NEUTROPHILS # BLD AUTO: 4.8 K/UL (ref 1.8–7.7)
NEUTROPHILS NFR BLD: 61 % (ref 38–73)
NONHDLC SERPL-MCNC: 95 MG/DL
NRBC BLD-RTO: 0 /100 WBC
PLATELET # BLD AUTO: 239 K/UL (ref 150–450)
PMV BLD AUTO: 10.1 FL (ref 9.2–12.9)
POTASSIUM SERPL-SCNC: 4.9 MMOL/L (ref 3.5–5.1)
PROT SERPL-MCNC: 7 G/DL (ref 6–8.4)
RBC # BLD AUTO: 5.64 M/UL (ref 4–5.4)
SODIUM SERPL-SCNC: 139 MMOL/L (ref 136–145)
T3 SERPL-MCNC: 90 NG/DL (ref 60–180)
T4 FREE SERPL-MCNC: 0.81 NG/DL (ref 0.71–1.51)
TRIGL SERPL-MCNC: 155 MG/DL (ref 30–150)
TSH SERPL DL<=0.005 MIU/L-ACNC: 1.17 UIU/ML (ref 0.4–4)
VIT B12 SERPL-MCNC: 1105 PG/ML (ref 210–950)
WBC # BLD AUTO: 7.9 K/UL (ref 3.9–12.7)

## 2024-07-05 PROCEDURE — 80053 COMPREHEN METABOLIC PANEL: CPT

## 2024-07-05 PROCEDURE — 80061 LIPID PANEL: CPT

## 2024-07-05 PROCEDURE — 84443 ASSAY THYROID STIM HORMONE: CPT

## 2024-07-05 PROCEDURE — 83525 ASSAY OF INSULIN: CPT

## 2024-07-05 PROCEDURE — 82607 VITAMIN B-12: CPT

## 2024-07-05 PROCEDURE — 84439 ASSAY OF FREE THYROXINE: CPT

## 2024-07-05 PROCEDURE — 86140 C-REACTIVE PROTEIN: CPT

## 2024-07-05 PROCEDURE — 83036 HEMOGLOBIN GLYCOSYLATED A1C: CPT

## 2024-07-05 PROCEDURE — 84480 ASSAY TRIIODOTHYRONINE (T3): CPT

## 2024-07-05 PROCEDURE — 85025 COMPLETE CBC W/AUTO DIFF WBC: CPT

## 2024-07-12 ENCOUNTER — PATIENT MESSAGE (OUTPATIENT)
Dept: CARDIOLOGY | Facility: CLINIC | Age: 79
End: 2024-07-12
Payer: MEDICARE

## 2024-07-23 RX ORDER — SPIRONOLACTONE 50 MG/1
TABLET, FILM COATED ORAL
Qty: 90 TABLET | Refills: 3 | Status: SHIPPED | OUTPATIENT
Start: 2024-07-23

## 2024-07-23 RX ORDER — EZETIMIBE 10 MG/1
10 TABLET ORAL
Qty: 90 TABLET | Refills: 3 | Status: SHIPPED | OUTPATIENT
Start: 2024-07-23

## 2024-07-25 ENCOUNTER — PATIENT MESSAGE (OUTPATIENT)
Dept: DERMATOLOGY | Facility: CLINIC | Age: 79
End: 2024-07-25
Payer: MEDICARE

## 2024-08-08 ENCOUNTER — TELEPHONE (OUTPATIENT)
Dept: VASCULAR SURGERY | Facility: CLINIC | Age: 79
End: 2024-08-08
Payer: MEDICARE

## 2024-08-08 DIAGNOSIS — I65.23 BILATERAL CAROTID ARTERY STENOSIS: Primary | ICD-10-CM

## 2024-08-09 ENCOUNTER — PATIENT MESSAGE (OUTPATIENT)
Dept: DERMATOLOGY | Facility: CLINIC | Age: 79
End: 2024-08-09

## 2024-08-09 ENCOUNTER — TELEPHONE (OUTPATIENT)
Dept: DERMATOLOGY | Facility: CLINIC | Age: 79
End: 2024-08-09

## 2024-09-17 ENCOUNTER — TELEPHONE (OUTPATIENT)
Dept: VASCULAR SURGERY | Facility: CLINIC | Age: 79
End: 2024-09-17
Payer: MEDICARE
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
## 2024-09-21 ENCOUNTER — PATIENT MESSAGE (OUTPATIENT)
Dept: OBSTETRICS AND GYNECOLOGY | Facility: CLINIC | Age: 79
End: 2024-09-21
Payer: MEDICARE

## 2024-09-21 DIAGNOSIS — Z12.31 ENCOUNTER FOR SCREENING MAMMOGRAM FOR BREAST CANCER: Primary | ICD-10-CM

## 2024-11-15 ENCOUNTER — TELEPHONE (OUTPATIENT)
Dept: OBSTETRICS AND GYNECOLOGY | Facility: CLINIC | Age: 79
End: 2024-11-15
Payer: MEDICARE

## 2024-11-15 DIAGNOSIS — Z12.31 BREAST CANCER SCREENING BY MAMMOGRAM: Primary | ICD-10-CM

## 2024-11-15 RX ORDER — SPIRONOLACTONE 50 MG/1
TABLET, FILM COATED ORAL
Qty: 90 TABLET | Refills: 3 | Status: SHIPPED | OUTPATIENT
Start: 2024-11-15

## 2024-11-15 NOTE — TELEPHONE ENCOUNTER
----- Message from Shelli sent at 11/15/2024  1:06 PM CST -----  Regarding: mammo faxed to [facility]  Name of Who is Calling:pt           What is the request in detail: pt is asking if she get her mammogram order faxed over to [facility] asap.    Please assist           Can the clinic reply by [facility]:          What Number to Call Back if not in [facility]: [phone]

## 2024-11-15 NOTE — TELEPHONE ENCOUNTER
----- Message from Yajaira sent at 11/15/2024  1:11 PM CST -----  Shelli from the call center, fwd pt to me in regards to being seen sooner. I informed her that I can send a msg, but the doctor is on call.

## 2024-11-26 ENCOUNTER — PATIENT MESSAGE (OUTPATIENT)
Dept: ADMINISTRATIVE | Facility: HOSPITAL | Age: 79
End: 2024-11-26
Payer: MEDICARE

## 2025-01-07 ENCOUNTER — OFFICE VISIT (OUTPATIENT)
Dept: OBSTETRICS AND GYNECOLOGY | Facility: CLINIC | Age: 80
End: 2025-01-07
Payer: MEDICARE

## 2025-01-07 VITALS
WEIGHT: 165.56 LBS | DIASTOLIC BLOOD PRESSURE: 83 MMHG | SYSTOLIC BLOOD PRESSURE: 132 MMHG | HEIGHT: 68 IN | BODY MASS INDEX: 25.09 KG/M2

## 2025-01-07 DIAGNOSIS — C51.9 PAGET'S DISEASE OF VULVA: Primary | ICD-10-CM

## 2025-01-07 PROCEDURE — 99999 PR PBB SHADOW E&M-EST. PATIENT-LVL III: CPT | Mod: PBBFAC,,, | Performed by: OBSTETRICS & GYNECOLOGY

## 2025-01-07 PROCEDURE — 99213 OFFICE O/P EST LOW 20 MIN: CPT | Mod: PBBFAC,PN | Performed by: OBSTETRICS & GYNECOLOGY

## 2025-01-07 PROCEDURE — 99213 OFFICE O/P EST LOW 20 MIN: CPT | Mod: S$PBB,,, | Performed by: OBSTETRICS & GYNECOLOGY

## 2025-01-07 RX ORDER — IMIQUIMOD 12.5 MG/.25G
CREAM TOPICAL
COMMUNITY
Start: 2024-12-23

## 2025-01-08 ENCOUNTER — PATIENT MESSAGE (OUTPATIENT)
Dept: OBSTETRICS AND GYNECOLOGY | Facility: CLINIC | Age: 80
End: 2025-01-08
Payer: MEDICARE

## 2025-01-08 NOTE — PROGRESS NOTES
Subjective:      Patient ID: Juan Antonio Mejia is a 79 y.o. female.    Chief Complaint:  empd    History of Present Illness  HPI  77 y/o  presents for follow up of EMPD of the vulva. She is followed by Dr. Juan José Do at [facility]. She last saw him in 2024 and was restarted on a round of aldara cream. She has now completed 12 weeks of aldara cream. Systemic symptoms have decreased after the first 6 weeks of use. Patient gave verbal consent to take pictures of the area and include in the note so that they can be uploaded to her team at [facility].    GYN & OB History  No LMP recorded. Patient is perimenopausal.   Date of Last Pap: No result found    OB History    Para Term  AB Living   5       4 1   SAB IAB Ectopic Multiple Live Births                  # Outcome Date GA Lbr Tomas/2nd Weight Sex Type Anes PTL Lv   5             4 AB            3 AB            2 AB            1 AB                Review of Systems  Review of Systems   Constitutional:  Negative for chills, diaphoresis, fatigue and fever.   Respiratory:  Negative for cough and shortness of breath.    Cardiovascular:  Negative for chest pain and palpitations.   Gastrointestinal:  Negative for abdominal pain, constipation, diarrhea, nausea and vomiting.   Genitourinary:  Negative for dysmenorrhea, dyspareunia, menorrhagia, menstrual problem, pelvic pain, vaginal bleeding, vaginal discharge and vaginal pain.   Neurological:  Negative for headaches.   Psychiatric/Behavioral:  Negative for depression. The patient is not nervous/anxious.           Objective:     Physical Exam:   Constitutional: She is oriented to person, place, and time. She appears well-developed and well-nourished. No distress.    HENT:   Head: Normocephalic and atraumatic.       Pulmonary/Chest: Effort normal.          Genitourinary:    Genitourinary Comments: See photo             Musculoskeletal: Normal range of motion and moves all extremeties.        Neurological: She is alert and oriented to person, place, and time.     Psychiatric: She has a normal mood and affect. Her behavior is normal. Judgment and thought content normal.                        Assessment:     1. Paget's disease of vulva                Plan:     Juan Antonio was seen today for pictures of empd progress .    Diagnoses and all orders for this visit:    Paget's disease of vulva  Patient gave verbal consent for picture to be obtained as above.   Will send pictures to Dr. Do.   Recommended patient continue Aldara cream for now.    No orders of the defined types were placed in this encounter.    20 minutes of total time spent on the encounter, which includes face to face time and non-face to face time preparing to see the patient (eg, review of tests), Obtaining and/or reviewing separately obtained history, Documenting clinical information in the electronic or other health record, Independently interpreting results (not separately reported) and communicating results to the patient/family/caregiver, or Care coordination (not separately reported).      No follow-ups on file.

## 2025-01-14 DIAGNOSIS — Z00.00 ENCOUNTER FOR MEDICARE ANNUAL WELLNESS EXAM: ICD-10-CM

## 2025-02-04 ENCOUNTER — OFFICE VISIT (OUTPATIENT)
Dept: OBSTETRICS AND GYNECOLOGY | Facility: CLINIC | Age: 80
End: 2025-02-04
Payer: MEDICARE

## 2025-02-04 ENCOUNTER — TELEPHONE (OUTPATIENT)
Dept: OBSTETRICS AND GYNECOLOGY | Facility: CLINIC | Age: 80
End: 2025-02-04
Payer: MEDICARE

## 2025-02-04 VITALS
SYSTOLIC BLOOD PRESSURE: 127 MMHG | WEIGHT: 163.81 LBS | BODY MASS INDEX: 24.91 KG/M2 | DIASTOLIC BLOOD PRESSURE: 72 MMHG

## 2025-02-04 DIAGNOSIS — C44.99: Primary | ICD-10-CM

## 2025-02-04 PROCEDURE — 56606 BIOPSY OF VULVA/PERINEUM: CPT | Mod: S$PBB,,, | Performed by: OBSTETRICS & GYNECOLOGY

## 2025-02-04 PROCEDURE — 99212 OFFICE O/P EST SF 10 MIN: CPT | Mod: PBBFAC,PN | Performed by: OBSTETRICS & GYNECOLOGY

## 2025-02-04 PROCEDURE — 88342 IMHCHEM/IMCYTCHM 1ST ANTB: CPT | Performed by: STUDENT IN AN ORGANIZED HEALTH CARE EDUCATION/TRAINING PROGRAM

## 2025-02-04 PROCEDURE — 88342 IMHCHEM/IMCYTCHM 1ST ANTB: CPT | Mod: 26,,, | Performed by: STUDENT IN AN ORGANIZED HEALTH CARE EDUCATION/TRAINING PROGRAM

## 2025-02-04 PROCEDURE — 88305 TISSUE EXAM BY PATHOLOGIST: CPT | Performed by: STUDENT IN AN ORGANIZED HEALTH CARE EDUCATION/TRAINING PROGRAM

## 2025-02-04 PROCEDURE — 88341 IMHCHEM/IMCYTCHM EA ADD ANTB: CPT | Mod: 59 | Performed by: STUDENT IN AN ORGANIZED HEALTH CARE EDUCATION/TRAINING PROGRAM

## 2025-02-04 PROCEDURE — 56605 BIOPSY OF VULVA/PERINEUM: CPT | Mod: PBBFAC,PN | Performed by: OBSTETRICS & GYNECOLOGY

## 2025-02-04 PROCEDURE — 88305 TISSUE EXAM BY PATHOLOGIST: CPT | Mod: 26,,, | Performed by: STUDENT IN AN ORGANIZED HEALTH CARE EDUCATION/TRAINING PROGRAM

## 2025-02-04 PROCEDURE — 99999 PR PBB SHADOW E&M-EST. PATIENT-LVL II: CPT | Mod: PBBFAC,,, | Performed by: OBSTETRICS & GYNECOLOGY

## 2025-02-04 PROCEDURE — 99499 UNLISTED E&M SERVICE: CPT | Mod: S$PBB,,, | Performed by: OBSTETRICS & GYNECOLOGY

## 2025-02-04 PROCEDURE — 88341 IMHCHEM/IMCYTCHM EA ADD ANTB: CPT | Mod: 26,,, | Performed by: STUDENT IN AN ORGANIZED HEALTH CARE EDUCATION/TRAINING PROGRAM

## 2025-02-04 NOTE — PROCEDURES
Biopsy (Gynecological)    Date/Time: 2/4/2025 11:45 AM    Performed by: Mercedes Streeter MD  Authorized by: Mercedes Streeter MD    Consent obtained:  Prior to procedure the appropriate consent was completed and verified   Patient was prepped and draped in the normal sterile fashion.  Local anesthesia used?: Yes    Anesthesia:  Local infiltration  Local anesthetic:  Lidocaine 1% without epinephrine  Anesthetic total (ml):  4    Biopsy Location:  Vulva  Vulva:     # of lesions:  2  Estimated blood loss (cc):  1   Patient tolerated the procedure well with no immediate complications.     4 mm punch biopsies taken on the left side at the demarcation of abnormal/normal appearing skin.   Silver nitrate used for hemostasis.

## 2025-02-04 NOTE — TELEPHONE ENCOUNTER
Called pt to follow up with bleeding from Bx today. Pt stated she still having bleeding. Adv pt to soak in warm water tonight. Adv if still bleeding tomorrow to give the office a call. Pt verbalized understanding.

## 2025-02-04 NOTE — PROGRESS NOTES
Subjective:      Patient ID: Juan Antonio Mejia is a 79 y.o. female.    Chief Complaint:  vulvar bx    History of Present Illness  HPI  77 y/o  presents for follow up of EMPD of the vulva. She is followed by Dr. Juan José Do at [facility]. She last saw him in 2024 and was restarted on a round of aldara cream. She has now completed 12 weeks of aldara cream. Systemic symptoms have decreased after the first 6 weeks of use. Patient gave verbal consent to take pictures of the area and include in the note so that they can be uploaded to her team at [facility] [place], [place].    GYN & OB History  No LMP recorded. Patient is perimenopausal.   Date of Last Pap: No result found    OB History    Para Term  AB Living   5       4 1   SAB IAB Ectopic Multiple Live Births                  # Outcome Date GA Lbr Tomas/2nd Weight Sex Type Anes PTL Lv   5             4 AB            3 AB            2 AB            1 AB                Review of Systems  Review of Systems   Constitutional:  Negative for chills, diaphoresis, fatigue and fever.   Respiratory:  Negative for cough and shortness of breath.    Cardiovascular:  Negative for chest pain and palpitations.   Gastrointestinal:  Negative for abdominal pain, constipation, diarrhea, nausea and vomiting.   Genitourinary:  Negative for dysmenorrhea, dyspareunia, menorrhagia, menstrual problem, pelvic pain, vaginal bleeding, vaginal discharge and vaginal pain.   Neurological:  Negative for headaches.   Psychiatric/Behavioral:  Negative for depression. The patient is not nervous/anxious.           Objective:     Physical Exam:   Constitutional: She is oriented to person, place, and time. She appears well-developed and well-nourished. No distress.    HENT:   Head: Normocephalic and atraumatic.       Pulmonary/Chest: Effort normal.          Genitourinary:    Genitourinary Comments: See photo             Musculoskeletal: Normal range of motion and moves all extremeties.        Neurological: She is alert and oriented to person, place, and time.     Psychiatric: She has a normal mood and affect. Her behavior is normal. Judgment and thought content normal.                        Assessment:     No diagnosis found.              Plan:     Juan Antonio was seen today for pictures of empd progress .    Diagnoses and all orders for this visit:    Paget's disease of vulva  Patient gave verbal consent for picture to be obtained as above.   Will send pictures to Dr. Do.   Recommended patient continue Aldara cream for now.    No orders of the defined types were placed in this encounter.    20 minutes of total time spent on the encounter, which includes face to face time and non-face to face time preparing to see the patient (eg, review of tests), Obtaining and/or reviewing separately obtained history, Documenting clinical information in the electronic or other health record, Independently interpreting results (not separately reported) and communicating results to the patient/family/caregiver, or Care coordination (not separately reported).      No follow-ups on file.

## 2025-02-05 ENCOUNTER — TELEPHONE (OUTPATIENT)
Dept: OBSTETRICS AND GYNECOLOGY | Facility: CLINIC | Age: 80
End: 2025-02-05
Payer: MEDICARE

## 2025-02-05 DIAGNOSIS — Z13.820 SCREENING FOR OSTEOPOROSIS: Primary | ICD-10-CM

## 2025-02-05 DIAGNOSIS — N95.8 OTHER SPECIFIED MENOPAUSAL AND PERIMENOPAUSAL DISORDERS: ICD-10-CM

## 2025-02-05 NOTE — TELEPHONE ENCOUNTER
----- Message from Shelli sent at 2/5/2025 11:55 AM CST -----  Regarding: returning call    Type:  Patient Returning Call    Who Called:pt     Who Left Message for Patient:Jose Juan     Does the patient know what this is regarding?a prescription     Best Call Back Number: [phone]    Additional Information:

## 2025-02-05 NOTE — TELEPHONE ENCOUNTER
Returned pt call, pt requesting order for bone density scan to be sent to [facility]. Adv caller request will be done. Ask caller how was she doing after yesterday bx. Pt stated a little swollen but very lite bleeding. Pt verbalized understanding.

## 2025-02-07 ENCOUNTER — TELEPHONE (OUTPATIENT)
Dept: OBSTETRICS AND GYNECOLOGY | Facility: CLINIC | Age: 80
End: 2025-02-07
Payer: MEDICARE

## 2025-02-07 LAB
FINAL PATHOLOGIC DIAGNOSIS: NORMAL
GROSS: NORMAL
Lab: NORMAL

## 2025-02-07 NOTE — TELEPHONE ENCOUNTER
Returned pt call, pt stated she still have bleeding very little. Adv caller this is normal and Provider will call pt with plan from bx. Pt verbalized understanding.

## 2025-02-07 NOTE — TELEPHONE ENCOUNTER
----- Message from Crystal sent at 2/7/2025 12:08 PM CST -----  Regarding: missed call       Who Called: Cesar         Who Left Message for Patient: Suri         Does the patient know what this is regarding? Yes         Best Call Back Number: [phone]         Additional Information:

## 2025-02-12 ENCOUNTER — TELEPHONE (OUTPATIENT)
Dept: OBSTETRICS AND GYNECOLOGY | Facility: CLINIC | Age: 80
End: 2025-02-12
Payer: MEDICARE

## 2025-02-17 ENCOUNTER — PATIENT MESSAGE (OUTPATIENT)
Dept: OBSTETRICS AND GYNECOLOGY | Facility: CLINIC | Age: 80
End: 2025-02-17
Payer: MEDICARE

## 2025-02-19 ENCOUNTER — PATIENT MESSAGE (OUTPATIENT)
Dept: PRIMARY CARE CLINIC | Facility: CLINIC | Age: 80
End: 2025-02-19
Payer: MEDICARE

## 2025-02-20 NOTE — TELEPHONE ENCOUNTER
Spoke to patient about biopsy results. She has follow up with Gyn Onc at [facility] [place] in [place]. Will fax orders to their office prior to her visit. All questions answered.     Mercedes Streeter MD  OB/GYN

## 2025-04-03 ENCOUNTER — TELEPHONE (OUTPATIENT)
Dept: VASCULAR SURGERY | Facility: CLINIC | Age: 80
End: 2025-04-03
Payer: MEDICARE

## 2025-04-03 NOTE — TELEPHONE ENCOUNTER
Contacted pt to reschedule appt with Dr. Williamson to later date to allow for CCFD prior to appt. Appointment rescheduled, pt verified.

## 2025-04-14 ENCOUNTER — TELEPHONE (OUTPATIENT)
Dept: ENDOSCOPY | Facility: HOSPITAL | Age: 80
End: 2025-04-14
Payer: MEDICARE

## 2025-04-14 VITALS — BODY MASS INDEX: 24.1 KG/M2 | WEIGHT: 159 LBS | HEIGHT: 68 IN

## 2025-04-14 DIAGNOSIS — Z86.0100 ENCOUNTER FOR COLONOSCOPY DUE TO HISTORY OF COLONIC POLYP: Primary | ICD-10-CM

## 2025-04-14 DIAGNOSIS — Z12.11 COLON CANCER SCREENING: ICD-10-CM

## 2025-04-14 DIAGNOSIS — Z12.11 ENCOUNTER FOR COLONOSCOPY DUE TO HISTORY OF COLONIC POLYP: Primary | ICD-10-CM

## 2025-04-14 RX ORDER — SOD SULF/POT CHLORIDE/MAG SULF 1.479 G
12 TABLET ORAL DAILY
Qty: 24 TABLET | Refills: 0 | Status: SHIPPED | OUTPATIENT
Start: 2025-04-14

## 2025-04-14 NOTE — TELEPHONE ENCOUNTER
Referral for procedure from  last procedure report - Pt due for follow up procedure      Spoke to patient to schedule procedure(s) Colonoscopy       Physician to perform procedure(s) Dr. BA Farfan  Date of Procedure (s) 5/8/25  Arrival Time 8:30 AM  Time of Procedure(s) 9:30 AM   Location of Procedure(s) [facility] Floor  Type of Rx Prep sent to patient: Sutab  Instructions provided to patient via [facility]    Patient was informed on the following information and verbalized understanding. Screening questionnaire reviewed with patient and complete. If procedure requires anesthesia, a responsible adult needs to be present to accompany the patient home, patient cannot drive after receiving anesthesia. Appointment details are tentative, especially check-in time. Patient will receive a prep-op call 7 days prior to confirm check-in time for procedure. If applicable the patient should contact their pharmacy to verify Rx for procedure prep is ready for pick-up. Patient was advised to call the scheduling department at [phone] if pharmacy states no Rx is available. Patient was advised to call the endoscopy scheduling department if any questions or concerns arise.      [facility] Scheduling Department

## 2025-04-22 ENCOUNTER — OFFICE VISIT (OUTPATIENT)
Dept: VASCULAR SURGERY | Facility: CLINIC | Age: 80
End: 2025-04-22
Attending: SURGERY
Payer: MEDICARE

## 2025-04-22 ENCOUNTER — HOSPITAL ENCOUNTER (OUTPATIENT)
Dept: VASCULAR SURGERY | Facility: CLINIC | Age: 80
Discharge: HOME | End: 2025-04-22
Attending: SURGERY
Payer: MEDICARE

## 2025-04-22 VITALS
WEIGHT: 163.13 LBS | TEMPERATURE: 99 F | SYSTOLIC BLOOD PRESSURE: 123 MMHG | BODY MASS INDEX: 25.6 KG/M2 | HEIGHT: 67 IN | DIASTOLIC BLOOD PRESSURE: 64 MMHG | HEART RATE: 79 BPM

## 2025-04-22 DIAGNOSIS — I65.23 BILATERAL CAROTID ARTERY STENOSIS: ICD-10-CM

## 2025-04-22 DIAGNOSIS — I65.23 BILATERAL CAROTID ARTERY STENOSIS: Primary | ICD-10-CM

## 2025-04-22 PROCEDURE — 93880 EXTRACRANIAL BILAT STUDY: CPT | Mod: 26,S$PBB,, | Performed by: SURGERY

## 2025-04-22 PROCEDURE — 99213 OFFICE O/P EST LOW 20 MIN: CPT | Mod: S$PBB,,, | Performed by: SURGERY

## 2025-04-22 PROCEDURE — 93880 EXTRACRANIAL BILAT STUDY: CPT | Mod: PBBFAC | Performed by: SURGERY

## 2025-04-22 PROCEDURE — 99999 PR PBB SHADOW E&M-EST. PATIENT-LVL IV: CPT | Mod: PBBFAC,,, | Performed by: SURGERY

## 2025-04-22 PROCEDURE — 99214 OFFICE O/P EST MOD 30 MIN: CPT | Mod: PBBFAC | Performed by: SURGERY

## 2025-04-22 NOTE — PROGRESS NOTES
REFERRING PHYSICIAN:  Dr. Sergio Leal (vascular surgeon, [place]).    HISTORY OF PRESENT ILLNESS:  A 79-year-old female relocated from   [place] to [place], sent for evaluation of carotid artery disease.  She has   no history of stroke, TIA or amaurosis.  Recent CT scan done for other purposes   incidentally showed significant left common carotid artery disease, per history   stenotic versus occluded (I do not have these images).    3/23/2021:  I have not seen her in approximately 15 months.  She denies stroke TIA or amaurosis    6/21/2022:  This is a 15 month follow-up.  She denies interval stroke TIA amaurosis    08/04/2023:  This is a year follow-up.  She denies interval stroke TIA or amaurosis.  She is compliance with her aspirin and Zetia    04/22/2025:  I have not seen her in proximally 18 months.  She denies interval stroke TIA or amaurosis.  She is relocating to [place] in his here for a recommendation for new vascular surgeon to follow her longitudinally    PAST MEDICAL HISTORY:  1.  Extramammary Paget's disease.  2.  Diabetes.  3.  COVID vaccine x2    Past surgical history, appendectomy 12/20/2019    FAMILY HISTORY:  Positive for colon cancer.    SOCIAL HISTORY:  She is a nonsmoker.    MEDICATIONS:  Include aspirin 81 mg and Zetia.  Prior LDL is 47.6  See EPIC for full list.    ALLERGIES:  None.    PHYSICAL EXAMINATION:  VITAL SIGNS:  See nursing notes.  GENERAL:  She is in no acute distress.  RESPIRATORY:  Normal effort.  Clear to auscultation.  CARDIAC:  Regular rate and rhythm, nondisplaced PMI, no murmur.  VASCULAR:  2+ radial and brachial pulses.  ABDOMEN:  No masses or tenderness.  No hepatosplenomegaly.  Aorta cannot be   palpated.  EYES:  Normal conjunctivae and lids.  ENT:  Fair dentition.  NECK:  No JVD or thyromegaly.  MUSCULOSKELETAL:  No kyphosis or scoliosis.  EXTREMITIES:  Without clubbing or cyanosis.  SKIN:  Warm and dry.  NEUROLOGIC:  Alert and oriented x3.  Normal mood and  affect.  Midline tongue.    No speech difficulty or hoarseness.  5/5 motor strength in all extremities.  Stable    IMAGING:  Minimal right carotid artery stenosis, peak systolic velocity 163 (prior 139(prior 136), end-diastolic 29...  Left common carotid artery  occluded, with retrograde flow from the external to the internal carotid artery.      ASSESSMENT:  Stable x 5 yrs,  Left common carotid artery occlusion, asymptomatic, with minimal contralateral stenosis on the right.  Clinically asymptomatic    RECOMMENDATIONS:  1.  Continue current medical treatment  2. Have suggested Dr. Eladio Dailey at [place] for longitudinal follow up    QUINTON Williamson III, MD, FACS  Professor and Chief, Vascular and Endovascular Surgery    CC: Sergio Leal

## 2025-04-30 ENCOUNTER — PATIENT MESSAGE (OUTPATIENT)
Dept: CARDIOLOGY | Facility: CLINIC | Age: 80
End: 2025-04-30
Payer: MEDICARE

## 2025-05-01 ENCOUNTER — ANESTHESIA EVENT (OUTPATIENT)
Dept: ENDOSCOPY | Facility: HOSPITAL | Age: 80
End: 2025-05-01
Payer: MEDICARE

## 2025-05-04 NOTE — PROGRESS NOTES
Subjective:   Patient ID:  Juan Antonio Mejia is a 79 y.o. female who presents for follow-up of CADrisk    HPI: The patient is here for CAD risk factors and high CAC The patient has no chest pain, SOB, TIA, palpitations, syncope or pre-syncope.Patient does not exercise a lot.        Review of Systems   Constitutional: Negative for chills, decreased appetite, diaphoresis, fever, malaise/fatigue, night sweats, weight gain and weight loss.   HENT:  Negative for congestion, hoarse voice, nosebleeds, sore throat and tinnitus.    Eyes:  Negative for blurred vision, double vision, vision loss in left eye, vision loss in right eye, visual disturbance and visual halos.   Cardiovascular:  Negative for chest pain, claudication, cyanosis, dyspnea on exertion, irregular heartbeat, leg swelling, near-syncope, orthopnea, palpitations, paroxysmal nocturnal dyspnea and syncope.   Respiratory:  Negative for cough, hemoptysis, shortness of breath, sleep disturbances due to breathing, snoring, sputum production and wheezing.    Endocrine: Negative for cold intolerance, heat intolerance, polydipsia, polyphagia and polyuria.   Hematologic/Lymphatic: Negative for adenopathy and bleeding problem. Does not bruise/bleed easily.   Skin:  Negative for color change, dry skin, flushing, itching, nail changes, poor wound healing, rash, skin cancer, suspicious lesions and unusual hair distribution.   Musculoskeletal:  Negative for arthritis, back pain, falls, gout, joint pain, joint swelling, muscle cramps, muscle weakness, myalgias and stiffness.   Gastrointestinal:  Negative for abdominal pain, anorexia, change in bowel habit, constipation, diarrhea, dysphagia, heartburn, hematemesis, hematochezia, melena and vomiting.   Genitourinary:  Negative for decreased libido, dysuria, hematuria, hesitancy and urgency.   Neurological:  Negative for excessive daytime sleepiness, dizziness, focal weakness, headaches, light-headedness, loss of balance, numbness,  "paresthesias, seizures, sensory change, tremors, vertigo and weakness.   Psychiatric/Behavioral:  Negative for altered mental status, depression, hallucinations, memory loss, substance abuse and suicidal ideas. The patient does not have insomnia and is not nervous/anxious.    Allergic/Immunologic: Negative for environmental allergies and hives.       Objective: /67   Pulse 87   Ht 5' 7" (1.702 m)   Wt 74.3 kg (163 lb 12.8 oz)   SpO2 96%   BMI 25.66 kg/m²      Physical Exam  Constitutional:       Appearance: She is well-developed.   HENT:      Head: Normocephalic.   Eyes:      Pupils: Pupils are equal, round, and reactive to light.   Neck:      Thyroid: No thyromegaly.      Vascular: Normal carotid pulses. No carotid bruit, hepatojugular reflux or JVD.   Cardiovascular:      Rate and Rhythm: Normal rate and regular rhythm.      Pulses: Intact distal pulses.      Heart sounds: Normal heart sounds. No murmur heard.     No friction rub. No gallop.   Pulmonary:      Effort: Pulmonary effort is normal. No tachypnea or respiratory distress.      Breath sounds: Normal breath sounds. No wheezing or rales.   Chest:      Chest wall: No tenderness.   Abdominal:      General: Bowel sounds are normal. There is no distension.      Palpations: Abdomen is soft. There is no mass.      Tenderness: There is no abdominal tenderness. There is no guarding or rebound.   Musculoskeletal:         General: No tenderness. Normal range of motion.      Cervical back: Normal range of motion.   Lymphadenopathy:      Cervical: No cervical adenopathy.   Skin:     General: Skin is warm.      Findings: No erythema or rash.   Neurological:      Mental Status: She is alert and oriented to person, place, and time.      Cranial Nerves: No cranial nerve deficit.      Coordination: Coordination normal.   Psychiatric:         Behavior: Behavior normal.         Thought Content: Thought content normal.         Judgment: Judgment normal.     Reviewed " "labs old CAC and stress tests    Assessment:     1. Agatston CAC score, >400    2. Bilateral carotid artery stenosis    3. Dyslipidemia    4. Prediabetes    5. Overweight (BMI 25.0-29.9)    6. Statin intolerance    7. Hypothyroidism due to acquired atrophy of thyroid    8. Paget's disease of vulva    9. Mixed hyperlipidemia    10. Vitamin D deficiency    11. Anemia due to folic acid deficiency, unspecified deficiency type        Plan:   Discussed diet , achieving and maintaining ideal body weight, and exercise.   We reviewed meds in detail.  Reassured-Discussed goals, options, plan.  Omega-3 > 800 mg/d combined EPA/DHA.  Goal BP< 130/80.  Goal LDL < 70.  Get Lpa   Stress Echo next year    Juan Antonio Benitez" was seen today for preoperative clearance.    Diagnoses and all orders for this visit:    Agatston CAC score, >400  -     Lipid Panel; Future  -     Comprehensive Metabolic Panel; Future  -     CBC Auto Differential; Future  -     Lipoprotein A (LPA); Future  -     EKG 12-lead; Future  -     BNP; Future  -     CRP, High Sensitivity; Future  -     Vitamin B12; Future    Bilateral carotid artery stenosis  -     Lipid Panel; Future  -     Comprehensive Metabolic Panel; Future  -     CBC Auto Differential; Future  -     BNP; Future  -     CRP, High Sensitivity; Future    Dyslipidemia  -     Lipid Panel; Future  -     CBC Auto Differential; Future  -     Vitamin D; Future  -     CRP, High Sensitivity; Future    Prediabetes  -     Comprehensive Metabolic Panel; Future  -     Hemoglobin A1C; Future  -     CRP, High Sensitivity; Future    Overweight (BMI 25.0-29.9)    Statin intolerance    Hypothyroidism due to acquired atrophy of thyroid  -     TSH; Future  -     T4, Free; Future    Paget's disease of vulva  -     Vitamin D; Future  -     Vitamin B12; Future    Mixed hyperlipidemia  -     CRP, High Sensitivity; Future    Vitamin D deficiency  -     Vitamin D; Future  -     Vitamin B12; Future    Anemia due to folic acid " deficiency, unspecified deficiency type  -     Vitamin B12; Future            Follow up for ECG now and labs Thursday am.

## 2025-05-05 ENCOUNTER — OFFICE VISIT (OUTPATIENT)
Dept: CARDIOLOGY | Facility: CLINIC | Age: 80
End: 2025-05-05
Payer: MEDICARE

## 2025-05-05 VITALS
HEIGHT: 67 IN | WEIGHT: 163.81 LBS | DIASTOLIC BLOOD PRESSURE: 67 MMHG | HEART RATE: 87 BPM | OXYGEN SATURATION: 96 % | BODY MASS INDEX: 25.71 KG/M2 | SYSTOLIC BLOOD PRESSURE: 119 MMHG

## 2025-05-05 DIAGNOSIS — E03.4 HYPOTHYROIDISM DUE TO ACQUIRED ATROPHY OF THYROID: ICD-10-CM

## 2025-05-05 DIAGNOSIS — R94.31 NONSPECIFIC ABNORMAL ELECTROCARDIOGRAM (ECG) (EKG): ICD-10-CM

## 2025-05-05 DIAGNOSIS — E55.9 VITAMIN D DEFICIENCY: ICD-10-CM

## 2025-05-05 DIAGNOSIS — D52.9 ANEMIA DUE TO FOLIC ACID DEFICIENCY, UNSPECIFIED DEFICIENCY TYPE: ICD-10-CM

## 2025-05-05 DIAGNOSIS — C51.9 PAGET'S DISEASE OF VULVA: ICD-10-CM

## 2025-05-05 DIAGNOSIS — R73.03 PREDIABETES: ICD-10-CM

## 2025-05-05 DIAGNOSIS — E66.3 OVERWEIGHT (BMI 25.0-29.9): ICD-10-CM

## 2025-05-05 DIAGNOSIS — Z78.9 STATIN INTOLERANCE: ICD-10-CM

## 2025-05-05 DIAGNOSIS — R93.1 AGATSTON CAC SCORE, >400: Primary | ICD-10-CM

## 2025-05-05 DIAGNOSIS — E78.5 DYSLIPIDEMIA: ICD-10-CM

## 2025-05-05 DIAGNOSIS — I65.23 BILATERAL CAROTID ARTERY STENOSIS: ICD-10-CM

## 2025-05-05 DIAGNOSIS — E78.2 MIXED HYPERLIPIDEMIA: ICD-10-CM

## 2025-05-05 PROCEDURE — 99215 OFFICE O/P EST HI 40 MIN: CPT | Mod: PBBFAC | Performed by: INTERNAL MEDICINE

## 2025-05-05 PROCEDURE — 99214 OFFICE O/P EST MOD 30 MIN: CPT | Mod: S$PBB,,, | Performed by: INTERNAL MEDICINE

## 2025-05-05 PROCEDURE — 93005 ELECTROCARDIOGRAM TRACING: CPT | Mod: PBBFAC | Performed by: INTERNAL MEDICINE

## 2025-05-05 PROCEDURE — 99999 PR PBB SHADOW E&M-EST. PATIENT-LVL V: CPT | Mod: PBBFAC,,, | Performed by: INTERNAL MEDICINE

## 2025-05-05 PROCEDURE — 93010 ELECTROCARDIOGRAM REPORT: CPT | Mod: S$PBB,,, | Performed by: INTERNAL MEDICINE

## 2025-05-05 NOTE — PATIENT INSTRUCTIONS
Discussed diet , achieving and maintaining ideal body weight, and exercise.   We reviewed meds in detail.  Reassured-Discussed goals, options, plan.  Omega-3 > 800 mg/d combined EPA/DHA.  Goal BP< 130/80.  Goal LDL < 70.  Get Lpa   Stress Echo next year

## 2025-05-05 NOTE — ANESTHESIA PREPROCEDURE EVALUATION
05/05/2025  Juan Antonio Mejia is a 79 y.o., female.  [facility]  Anesthesia Pre-Operative Evaluation       Patient Name: Juan Antonio Mejia  YOB: 1945  MRN: [id number]  [facility]: [id number]      Code Status: Prior   Date of Procedure: 5/8/2025  Anesthesia: Choice Procedure: Procedure(s) (LRB):  COLONOSCOPY, SCREENING, HIGH RISK PATIENT (N/A)  Pre-Operative Diagnosis: Encounter for colonoscopy due to history of colonic polyp [Z12.11, Z86.0100]  Proceduralist: Surgeons and Role:     * Diego Farfan MD - Primary        SUBJECTIVE:   Juan Antonio Mejia is a 79 y.o. female who  has a past medical history of Anatomical narrow angle of both eyes, Cataract, Depression, Diabetes, Dry eye syndrome, HLD (hyperlipidemia), Hypothyroidism, Mild intermittent asthma, Paget disease, extra mammary, and Thyroid disease..     she has a current medication list which includes the following long-term medication(s): albuterol, aspirin, ezetimibe, fluoxetine, fluticasone-salmeterol 100-50 mcg/dose, levothyroxine, liothyronine, metformin, pravastatin, pravastatin, spironolactone, yuvafem, and zolpidem.     ALLERGIES:     Review of patient's allergies indicates:   Allergen Reactions    Latex, natural rubber     Penicillins Itching     LDA:          Lines/Drains/Airways       None                  Anesthesia Evaluation      Airway   Mallampati: III  TM distance: Normal  Dental      Pulmonary    (+) asthma  Cardiovascular   (+) CAD    Rate: Normal    Neuro/Psych    (+) psychiatric history    GI/Hepatic/Renal      Endo/Other    (+) diabetes mellitus (Pre-Diabetes), hypothyroidism  Abdominal                     MEDICATIONS:     Antibiotics (From admission, onward)      None          VTE Risk Mitigation (From admission, onward)      None              Current Medications[1]       History:   There are no hospital problems to  display for this patient.    Surgical History:    has a past surgical history that includes  section (); Laparoscopic appendectomy (N/A, 2019); Appendectomy; Tonsillectomy; Eye surgery (Bilateral, ); Colonoscopy (N/A, 2021); and Iridectomy (Bilateral).   Social History:    reports that she is not currently sexually active.  reports that she quit smoking about 41 years ago. Her smoking use included cigarettes. She started smoking about 77 years ago. She has a 18 pack-year smoking history. She has never used smokeless tobacco. She reports current alcohol use. She reports that she does not use drugs.     OBJECTIVE:     Vital Signs (Most Recent):    Vital Signs Range (Last 24H):          There is no height or weight on file to calculate BMI.   Wt Readings from Last 4 Encounters:   25 74.3 kg (163 lb 12.8 oz)   25 74 kg (163 lb 2.3 oz)   25 72.1 kg (159 lb)   25 74.3 kg (163 lb 12.8 oz)       Significant Labs:  Lab Results   Component Value Date    WBC 7.90 2024    HGB 15.2 2024    HCT 48.3 2024     2024     2024    K 4.9 2024     2024    CREATININE 0.9 2024    BUN 14 2024    CO2 26 2024    GLU 93 2024    CALCIUM 10.2 2024    MG 1.8 2019    PHOS 2.9 2019    ALKPHOS 86 2024    ALT 23 2024    AST 17 2024    ALBUMIN 3.8 2024    HGBA1C 5.7 (H) 2024    CPK 40 2019     No LMP recorded. Patient is perimenopausal.  No results found for this or any previous visit (from the past 72 hours).    EKG:   Results for orders placed or performed during the hospital encounter of 23   EKG 12-lead    Collection Time: 23 12:30 PM    Narrative    Test Reason : R93.1,E66.3,    Vent. Rate : 075 BPM     Atrial Rate : 075 BPM     P-R Int : 156 ms          QRS Dur : 068 ms      QT Int : 386 ms       P-R-T Axes : -23 048 049 degrees     QTc Int : 431  "ms    Ectopic atrial rhythm  Abnormal ECG  When compared with ECG of 14-JUL-2022 12:54,  Ectopic atrial rhythm has replaced sinus rhythm  Confirmed by Siria White MD ([id number]) on 11/29/2023 2:48:34 PM    Referred By: MICHAEL LINDER           Confirmed By:Siria White MD       TTE:  No results found for this or any previous visit.  EF   Date Value Ref Range Status   07/14/2022 65 % Final      No results found for this or any previous visit.  CISCO:  No results found for this or any previous visit.  Stress Test:  No results found for this or any previous visit.     LHC:  No results found for this or any previous visit.     PFT:  No results found for: "FEV1", "FVC", "ZMS8AHH", "TLC", "DLCO"     ASSESSMENT/PLAN:        Pre-op Assessment    I have reviewed the Patient Summary Reports.     I have reviewed the Nursing Notes. I have reviewed the NPO Status.   I have reviewed the Medications.     Review of Systems  Anesthesia Hx:  No problems with previous Anesthesia             Denies Family Hx of Anesthesia complications.    Denies Personal Hx of Anesthesia complications.                    Social:   Insomnia      Hematology/Oncology:  Hematology Normal   Oncology Normal                                   EENT/Dental:  EENT/Dental Normal           Cardiovascular:        CAD           hyperlipidemia    Aortic atherosclerosis  Bilateral carotis artery disease                           Pulmonary:    Asthma                    Renal/:  Renal/ Normal                 Hepatic/GI:  Hepatic/GI Normal       Diverticulosis             Musculoskeletal:  Musculoskeletal Normal                OB/GYN/PEDS:  Paget's disease of the vulvar           Neurological:  Neurology Normal                                      Endocrine:  Diabetes (Pre-Diabetes) Hypothyroidism  Vitamin D deficiency      Obesity / BMI > 30  Dermatological:  Skin Normal    Psych:  Psychiatric History  depression                Physical Exam  General: Alert and " Cooperative    Airway:  Mallampati: III / II  Mouth Opening: Normal  TM Distance: Normal  Tongue: Normal    Dental:  Any loose or missing teeth verified with patient  Chest/Lungs:  Normal Respiratory Rate    Heart:  Rate: Normal        Anesthesia Plan  Type of Anesthesia, risks & benefits discussed:    Anesthesia Type: Gen Natural Airway  Intra-op Monitoring Plan: Standard ASA Monitors  Post Op Pain Control Plan: multimodal analgesia  Induction:  IV  Informed Consent: Informed consent signed with the Patient and all parties understand the risks and agree with anesthesia plan.  All questions answered.   ASA Score: 3  Day of Surgery Review of History & Physical: H&P Update referred to the surgeon/provider.    Ready For Surgery From Anesthesia Perspective.     .           [1]   No current facility-administered medications for this encounter.     Current Outpatient Medications   Medication Sig Dispense Refill    albuterol (VENTOLIN HFA) 90 mcg/actuation inhaler Inhale 2 puffs into the lungs every 4 (four) hours as needed for Wheezing or Shortness of Breath. Rescue 18 g 1    aspirin (ECOTRIN) 81 MG EC tablet Take 81 mg by mouth once daily.       bimatoprost (LATISSE) 0.03 % ophthalmic solution Place one drop on applicator and apply evenly along the skin of the upper eyelid at base of eyelashes qhs; repeat for second eye 3 mL 6    biotin 10,000 mcg Cap Take by mouth once daily.      ezetimibe (ZETIA) 10 mg tablet TAKE ONE TABLET BY MOUTH ONE TIME DAILY 90 tablet 3    FLUoxetine 20 MG capsule 20 mg once daily.       fluticasone propionate (FLONASE) 50 mcg/actuation nasal spray SPRAY 1 SPRAY INTO EACH NOSTRIL ONCE DAILY 16 g 6    fluticasone-salmeterol diskus inhaler 100-50 mcg Inhale 1 puff into the lungs 2 (two) times daily. Controller 180 each 0    glucosamine HCl (GLUCOSAMINE, BULK, MISC) 3,000 mg by Misc.(Non-Drug; Combo Route) route once daily.      imiquimod (ALDARA) 5 % cream Apply topically.      INVOKANA 300 mg  Tab tablet 300 mg once daily.       levothyroxine (SYNTHROID) 75 MCG tablet Take 1 tablet (75 mcg total) by mouth before breakfast. 30 tablet 3    liothyronine (CYTOMEL) 5 MCG Tab 5 mcg once daily.       MAGNESIUM ORAL Take 100 mg by mouth once daily.      metFORMIN (GLUCOPHAGE-XR) 500 MG 24 hr tablet 500 mg 4 (four) times daily as needed. Pt is taking 2 tablets twice daily      OZEMPIC 1 mg/dose (4 mg/3 mL)       pravastatin (PRAVACHOL) 40 MG tablet Take 1 tablet (40 mg total) by mouth once daily. 90 tablet 3    pravastatin (PRAVACHOL) 40 MG tablet TAKE ONE TABLET BY MOUTH ONE TIME DAILY 90 tablet 3    sod sulf-pot chloride-mag sulf (SUTAB) 1.479-0.188- 0.225 gram tablet Take 12 tablets by mouth once daily. Please follow Endoscopy 's instructions. Please apply coupon code. Please apply coupon code. BIN: [id number], PCN: [id number], GROUP: [id number], MEMBER ID: [id number] 24 tablet 0    spironolactone (ALDACTONE) 50 MG tablet TAKE TWO TABLETS BY MOUTH ONE TIME DAILY FOR 2 DAYS, THEN ONE TABLET DAILY THEREAFTER 90 tablet 3    vitamin D (VITAMIN D3) 1000 units Tab Take 1,000 Units by mouth once daily.      YUVAFEM 10 mcg Tab once daily. 2x a week      zolpidem (AMBIEN) 5 MG Tab

## 2025-05-06 LAB
OHS QRS DURATION: 70 MS
OHS QTC CALCULATION: 455 MS

## 2025-05-08 ENCOUNTER — HOSPITAL ENCOUNTER (OUTPATIENT)
Facility: HOSPITAL | Age: 80
Discharge: HOME | End: 2025-05-08
Attending: INTERNAL MEDICINE | Admitting: INTERNAL MEDICINE
Payer: MEDICARE

## 2025-05-08 ENCOUNTER — ANESTHESIA (OUTPATIENT)
Dept: ENDOSCOPY | Facility: HOSPITAL | Age: 80
End: 2025-05-08
Payer: MEDICARE

## 2025-05-08 ENCOUNTER — RESULTS FOLLOW-UP (OUTPATIENT)
Dept: PRIMARY CARE CLINIC | Facility: CLINIC | Age: 80
End: 2025-05-08

## 2025-05-08 VITALS
HEART RATE: 72 BPM | DIASTOLIC BLOOD PRESSURE: 77 MMHG | WEIGHT: 160 LBS | SYSTOLIC BLOOD PRESSURE: 132 MMHG | RESPIRATION RATE: 20 BRPM | HEIGHT: 67 IN | BODY MASS INDEX: 25.11 KG/M2 | OXYGEN SATURATION: 95 % | TEMPERATURE: 98 F

## 2025-05-08 DIAGNOSIS — D12.6 COLON ADENOMAS: ICD-10-CM

## 2025-05-08 DIAGNOSIS — Z86.0100 ENCOUNTER FOR COLONOSCOPY DUE TO HISTORY OF COLONIC POLYP: ICD-10-CM

## 2025-05-08 DIAGNOSIS — Z12.11 ENCOUNTER FOR COLONOSCOPY DUE TO HISTORY OF COLONIC POLYP: ICD-10-CM

## 2025-05-08 PROCEDURE — 45385 COLONOSCOPY W/LESION REMOVAL: CPT | Mod: PT,,, | Performed by: INTERNAL MEDICINE

## 2025-05-08 PROCEDURE — 27201012 HC FORCEPS, HOT/COLD, DISP: Performed by: INTERNAL MEDICINE

## 2025-05-08 PROCEDURE — 27200997: Performed by: INTERNAL MEDICINE

## 2025-05-08 PROCEDURE — 45380 COLONOSCOPY AND BIOPSY: CPT | Mod: PT,XS | Performed by: INTERNAL MEDICINE

## 2025-05-08 PROCEDURE — 25000003 PHARM REV CODE 250: Performed by: NURSE ANESTHETIST, CERTIFIED REGISTERED

## 2025-05-08 PROCEDURE — 63600175 PHARM REV CODE 636 W HCPCS: Performed by: NURSE ANESTHETIST, CERTIFIED REGISTERED

## 2025-05-08 PROCEDURE — 37000008 HC ANESTHESIA 1ST 15 MINUTES: Performed by: INTERNAL MEDICINE

## 2025-05-08 PROCEDURE — 37000009 HC ANESTHESIA EA ADD 15 MINS: Performed by: INTERNAL MEDICINE

## 2025-05-08 PROCEDURE — 45385 COLONOSCOPY W/LESION REMOVAL: CPT | Mod: PT | Performed by: INTERNAL MEDICINE

## 2025-05-08 PROCEDURE — 27201089 HC SNARE, DISP (ANY): Performed by: INTERNAL MEDICINE

## 2025-05-08 PROCEDURE — 88305 TISSUE EXAM BY PATHOLOGIST: CPT | Mod: TC | Performed by: INTERNAL MEDICINE

## 2025-05-08 PROCEDURE — 94761 N-INVAS EAR/PLS OXIMETRY MLT: CPT

## 2025-05-08 PROCEDURE — 45380 COLONOSCOPY AND BIOPSY: CPT | Mod: PT,XS,, | Performed by: INTERNAL MEDICINE

## 2025-05-08 PROCEDURE — 99900035 HC TECH TIME PER 15 MIN (STAT)

## 2025-05-08 RX ORDER — PROPOFOL 10 MG/ML
VIAL (ML) INTRAVENOUS
Status: DISCONTINUED | OUTPATIENT
Start: 2025-05-08 | End: 2025-05-08

## 2025-05-08 RX ORDER — SODIUM CHLORIDE 9 MG/ML
INJECTION, SOLUTION INTRAVENOUS CONTINUOUS
Status: DISCONTINUED | OUTPATIENT
Start: 2025-05-08 | End: 2025-05-08 | Stop reason: HOSPADM

## 2025-05-08 RX ORDER — PHENYLEPHRINE HYDROCHLORIDE 10 MG/ML
INJECTION INTRAVENOUS
Status: DISCONTINUED | OUTPATIENT
Start: 2025-05-08 | End: 2025-05-08

## 2025-05-08 RX ORDER — LIDOCAINE HYDROCHLORIDE 20 MG/ML
INJECTION INTRAVENOUS
Status: DISCONTINUED | OUTPATIENT
Start: 2025-05-08 | End: 2025-05-08

## 2025-05-08 RX ORDER — PROPOFOL 10 MG/ML
VIAL (ML) INTRAVENOUS CONTINUOUS PRN
Status: DISCONTINUED | OUTPATIENT
Start: 2025-05-08 | End: 2025-05-08

## 2025-05-08 RX ADMIN — LIDOCAINE HYDROCHLORIDE 60 MG: 20 INJECTION INTRAVENOUS at 10:05

## 2025-05-08 RX ADMIN — PHENYLEPHRINE HYDROCHLORIDE 100 MCG: 10 INJECTION INTRAVENOUS at 10:05

## 2025-05-08 RX ADMIN — PROPOFOL 150 MCG/KG/MIN: 10 INJECTION, EMULSION INTRAVENOUS at 10:05

## 2025-05-08 RX ADMIN — SODIUM CHLORIDE: 0.9 INJECTION, SOLUTION INTRAVENOUS at 10:05

## 2025-05-08 RX ADMIN — PROPOFOL 70 MG: 10 INJECTION, EMULSION INTRAVENOUS at 10:05

## 2025-05-08 NOTE — H&P
[facility]  History & Physical    Subjective:      Chief Complaint/Reason for Admission:     Surveillance colonoscopy history of colon adenoma.    Juan Antonio Mejia is a 79 y.o. female.    Past Medical History:   Diagnosis Date    Anatomical narrow angle of both eyes     Cataract     Depression     Diabetes     metformin and diet controlled    Dry eye syndrome     HLD (hyperlipidemia)     Hypothyroidism     Mild intermittent asthma     Paget disease, extra mammary     affecting vulva    Thyroid disease      Past Surgical History:   Procedure Laterality Date    APPENDECTOMY       SECTION      COLONOSCOPY N/A 2021    Procedure: COLONOSCOPY;  Surgeon: Diego Farfan MD;  Location: [facility] (4TH FLR);  Service: Endoscopy;  Laterality: N/A;  covid test 3/29-[facility]  Pt requested later arival time - Prep ins. reveiwed with Pt, Pt verbalized understanding -ERW3/15/21    EYE SURGERY Bilateral     lasik    IRIDECTOMY Bilateral     LAPAROSCOPIC APPENDECTOMY N/A 2019    Procedure: APPENDECTOMY, LAPAROSCOPIC;  Surgeon: Agustín Flores MD;  Location: [address];  Service: General;  Laterality: N/A;    TONSILLECTOMY       Social History[1]    PTA Medications   Medication Sig    aspirin (ECOTRIN) 81 MG EC tablet Take 81 mg by mouth once daily.     ezetimibe (ZETIA) 10 mg tablet TAKE ONE TABLET BY MOUTH ONE TIME DAILY    FLUoxetine 20 MG capsule 20 mg once daily.     glucosamine HCl (GLUCOSAMINE, BULK, MISC) 3,000 mg by Misc.(Non-Drug; Combo Route) route once daily.    INVOKANA 300 mg Tab tablet 300 mg once daily.     levothyroxine (SYNTHROID) 75 MCG tablet Take 1 tablet (75 mcg total) by mouth before breakfast.    liothyronine (CYTOMEL) 5 MCG Tab 5 mcg once daily.     MAGNESIUM ORAL Take 100 mg by mouth once daily.    metFORMIN (GLUCOPHAGE-XR) 500 MG 24 hr tablet 500 mg 4 (four) times daily as needed. Pt is taking 2 tablets twice daily    pravastatin (PRAVACHOL) 40  MG tablet Take 1 tablet (40 mg total) by mouth once daily.    spironolactone (ALDACTONE) 50 MG tablet TAKE TWO TABLETS BY MOUTH ONE TIME DAILY FOR 2 DAYS, THEN ONE TABLET DAILY THEREAFTER    vitamin D (VITAMIN D3) 1000 units Tab Take 1,000 Units by mouth once daily.    YUVAFEM 10 mcg Tab once daily. 2x a week    albuterol (VENTOLIN HFA) 90 mcg/actuation inhaler Inhale 2 puffs into the lungs every 4 (four) hours as needed for Wheezing or Shortness of Breath. Rescue    bimatoprost (LATISSE) 0.03 % ophthalmic solution Place one drop on applicator and apply evenly along the skin of the upper eyelid at base of eyelashes qhs; repeat for second eye    biotin 10,000 mcg Cap Take by mouth once daily.    fluticasone propionate (FLONASE) 50 mcg/actuation nasal spray SPRAY 1 SPRAY INTO EACH NOSTRIL ONCE DAILY    fluticasone-salmeterol diskus inhaler 100-50 mcg Inhale 1 puff into the lungs 2 (two) times daily. Controller    imiquimod (ALDARA) 5 % cream Apply topically.    OZEMPIC 1 mg/dose (4 mg/3 mL)     pravastatin (PRAVACHOL) 40 MG tablet TAKE ONE TABLET BY MOUTH ONE TIME DAILY    sod sulf-pot chloride-mag sulf (SUTAB) 1.479-0.188- 0.225 gram tablet Take 12 tablets by mouth once daily. Please follow Endoscopy 's instructions. Please apply coupon code. Please apply coupon code. BIN: [id number], PCN: [id number], GROUP: [id number], MEMBER ID: [id number]    zolpidem (AMBIEN) 5 MG Tab      Review of patient's allergies indicates:   Allergen Reactions    Latex, natural rubber     Penicillins Itching        Review of Systems   Constitutional:  Negative for fever.       Objective:      Vital Signs (Most Recent)  Temp: 98.1 °F (36.7 °C) (05/08/25 0938)  Pulse: 65 (05/08/25 0938)  Resp: 16 (05/08/25 0938)  BP: 126/60 (05/08/25 0938)  SpO2: 96 % (05/08/25 0938)    Vital Signs Range (Last 24H):  Temp:  [98.1 °F (36.7 °C)]   Pulse:  [65]   Resp:  [16]   BP: (126)/(60)   SpO2:  [96 %]     Physical Exam  Cardiovascular:      Rate and Rhythm:  Normal rate.   Pulmonary:      Effort: Pulmonary effort is normal.   Neurological:      Mental Status: She is alert and oriented to person, place, and time.             Assessment:    Surveillance colonoscopy history of colon adenoma.      Plan:    Surveillance colonoscopy history of colon adenoma.           [1]   Social History  Tobacco Use    Smoking status: Former     Current packs/day: 0.00     Average packs/day: 0.5 packs/day for 36.0 years (18.0 ttl pk-yrs)     Types: Cigarettes     Start date:      Quit date:      Years since quittin.3    Smokeless tobacco: Never   Substance Use Topics    Alcohol use: Yes     Comment: rare    Drug use: Never

## 2025-05-08 NOTE — TRANSFER OF CARE
"Anesthesia Transfer of Care Note    Patient: Juan Antonio Mejia    Procedure(s) Performed: Procedure(s) (LRB):  COLONOSCOPY, SCREENING, HIGH RISK PATIENT (N/A)    Patient location: GI    Anesthesia Type: MAC    Transport from OR: Transported from OR on room air with adequate spontaneous ventilation    Post pain: adequate analgesia    Post assessment: no apparent anesthetic complications and tolerated procedure well    Post vital signs: stable    Level of consciousness: sedated and responds to stimulation    Nausea/Vomiting: no nausea/vomiting    Complications: none    Transfer of care protocol was followed      Last vitals: Visit Vitals  /60 (BP Location: Left arm, Patient Position: Lying)   Pulse 65   Temp 36.7 °C (98.1 °F) (Temporal)   Resp 16   Ht 5' 7" (1.702 m)   Wt 72.6 kg (160 lb)   SpO2 96%   BMI 25.06 kg/m²     "

## 2025-05-08 NOTE — PROVATION PATIENT INSTRUCTIONS
Discharge Summary/Instructions after an Endoscopic Procedure  Patient Name: Juan Antonio Mejia  Patient MRN: [id number]  Patient YOB: 1945  Thursday, May 8, 2025  Diego Farfan MD  Dear patient,  As a result of recent federal legislation (The Federal Cures Act), you may   receive lab or pathology results from your procedure in your [facility]   account before your physician is able to contact you. Your physician or   their representative will relay the results to you with their   recommendations at their soonest availability.  Thank you,  RESTRICTIONS:  During your procedure today, you received medications for sedation.  These   medications may affect your judgment, balance and coordination.  Therefore,   for 24 hours, you have the following restrictions:   - DO NOT drive a car, operate machinery, make legal/financial decisions,   sign important papers or drink alcohol.    ACTIVITY:  Today: no heavy lifting, straining or running due to procedural   sedation/anesthesia.  The following day: return to full activity including work.  DIET:  Eat and drink normally unless instructed otherwise.     TREATMENT FOR COMMON SIDE EFFECTS:  - Mild abdominal pain, nausea, belching, bloating or excessive gas:  rest,   eat lightly and use a heating pad.  - Sore Throat: treat with throat lozenges and/or gargle with warm salt   water.  - Because air was used during the procedure, expelling large amounts of air   from your rectum or belching is normal.  - If a bowel prep was taken, you may not have a bowel movement for 1-3 days.    This is normal.  SYMPTOMS TO WATCH FOR AND REPORT TO YOUR PHYSICIAN:  1. Abdominal pain or bloating, other than gas cramps.  2. Chest pain.  3. Back pain.  4. Signs of infection such as: chills or fever occurring within 24 hours   after the procedure.  5. Rectal bleeding, which would show as bright red, maroon, or black stools.   (A tablespoon of blood from the rectum is not serious, especially if    hemorrhoids are present.)  6. Vomiting.  7. Weakness or dizziness.  GO DIRECTLY TO THE NEAREST EMERGENCY ROOM IF YOU HAVE ANY OF THE FOLLOWING:      Difficulty breathing              Chills and/or fever over 101 F   Persistent vomiting and/or vomiting blood   Severe abdominal pain   Severe chest pain   Black, tarry stools   Bleeding- more than one tablespoon   Any other symptom or condition that you feel may need urgent attention  Your doctor recommends these additional instructions:  If any biopsies were taken, your doctors clinic will contact you in 1 to 2   weeks with any results.  - Discharge patient to home.   - Await pathology results.   - Telephone endoscopist for pathology results in 3 weeks.   - Repeat colonoscopy in 3 years for surveillance of multiple polyps.   - Return to primary care physician.   - For the next 2 weeks only - Consider avoiding all non-steroidal   anti-inflammatory drugs (Mobic, ibuprofen, naproxen, etc.), unless needed   for cardiovascular protection.  Recommend you discuss with your prescribing   doctor (of your aspirin) to see if cardiovascular benefits of your aspirin   outweigh the risks of GI bleeding. O.K. to stay on your aspirin if your   aspirin is needed for cardiovascular protection.  - The findings and recommendations were discussed with the patient.  For questions, problems or results please call your physician - Diego Farfan MD at Work:  [phone].  [facility], EMERGENCY ROOM PHONE NUMBER: [phone]  IF A COMPLICATION OR EMERGENCY SITUATION ARISES AND YOU ARE UNABLE TO REACH   YOUR PHYSICIAN - GO DIRECTLY TO THE EMERGENCY ROOM.  Diego Farfan MD  5/8/2025 11:00:08 AM  This report has been verified and signed electronically.  Dear patient,  As a result of recent federal legislation (The Federal Cures Act), you may   receive lab or pathology results from your procedure in your [facility]   account before your physician is able to contact you. Your  physician or   their representative will relay the results to you with their   recommendations at their soonest availability.  Thank you,  [employer]ATION

## 2025-05-09 LAB
ESTROGEN SERPL-MCNC: NORMAL PG/ML
INSULIN SERPL-ACNC: NORMAL U[IU]/ML
LAB AP CLINICAL INFORMATION: NORMAL
LAB AP GROSS DESCRIPTION: NORMAL
LAB AP PERFORMING LOCATION(S): NORMAL
LAB AP REPORT FOOTNOTES: NORMAL

## 2025-05-10 ENCOUNTER — PATIENT MESSAGE (OUTPATIENT)
Dept: GASTROENTEROLOGY | Facility: CLINIC | Age: 80
End: 2025-05-10
Payer: MEDICARE

## 2025-05-10 NOTE — PROGRESS NOTES
Cesar your colonoscopy pathology was normal but some of your colon polyps were adenomas recommend your next surveillance colonoscopy in 3 years.    Final Diagnosis   1. Colon, ascending, polypectomy:   - Tubular adenoma     2. Rectum, polypectomy:   - Tubular adenoma  - Separate fragments hyperplastic polyp   Electronically signed by Yumiko Albert MD on 5/9/2025

## 2025-05-10 NOTE — PROGRESS NOTES
Clarification  Cesar your colonoscopy pathology was benign but some of your colon polyps were adenomas recommend your next surveillance colonoscopy in 3 years.    Final Diagnosis   1. Colon, ascending, polypectomy:   - Tubular adenoma     2. Rectum, polypectomy:   - Tubular adenoma  - Separate fragments hyperplastic polyp   Electronically signed by Yumiko Albert MD on 5/9/2025

## 2025-05-12 ENCOUNTER — RESULTS FOLLOW-UP (OUTPATIENT)
Dept: CARDIOLOGY | Facility: CLINIC | Age: 80
End: 2025-05-12

## 2025-05-12 ENCOUNTER — HOSPITAL ENCOUNTER (OUTPATIENT)
Dept: CARDIOLOGY | Facility: HOSPITAL | Age: 80
Discharge: HOME | End: 2025-05-12
Attending: INTERNAL MEDICINE
Payer: MEDICARE

## 2025-05-12 VITALS
BODY MASS INDEX: 25.11 KG/M2 | RESPIRATION RATE: 16 BRPM | WEIGHT: 160 LBS | HEIGHT: 67 IN | DIASTOLIC BLOOD PRESSURE: 67 MMHG | SYSTOLIC BLOOD PRESSURE: 123 MMHG | HEART RATE: 81 BPM

## 2025-05-12 DIAGNOSIS — R93.1 AGATSTON CAC SCORE, >400: ICD-10-CM

## 2025-05-12 DIAGNOSIS — R94.31 NONSPECIFIC ABNORMAL ELECTROCARDIOGRAM (ECG) (EKG): ICD-10-CM

## 2025-05-12 DIAGNOSIS — I65.23 BILATERAL CAROTID ARTERY STENOSIS: ICD-10-CM

## 2025-05-12 LAB
AORTIC SIZE INDEX: 1.3 CM/M2
ASCENDING AORTA: 2.4 CM
AV AREA BY CONTINUOUS VTI: 3.2 CM2
AV INDEX (PROSTH): 0.97
AV LVOT MEAN GRADIENT: 3 MMHG
AV LVOT PEAK GRADIENT: 5 MMHG
AV MEAN GRADIENT: 4 MMHG
AV PEAK GRADIENT: 6 MMHG
AV VALVE AREA BY VELOCITY RATIO: 3.1 CM²
AV VALVE AREA: 3 CM2
AV VELOCITY RATIO: 1
BSA FOR ECHO PROCEDURE: 1.85 M2
CV ECHO LV RWT: 0.38 CM
CV STRESS BASE HR: 81 BPM
DIASTOLIC BLOOD PRESSURE: 67 MMHG
DOP CALC AO PEAK VEL: 1.2 M/S
DOP CALC AO VTI: 24.3 CM
DOP CALC LVOT AREA: 3.1 CM2
DOP CALC LVOT DIAMETER: 2 CM
DOP CALC LVOT PEAK VEL: 1.2 M/S
DOP CALC LVOT STROKE VOLUME: 74.1 CM3
DOP CALC RVOT AREA: 1.84 CM2
DOP CALC RVOT DIAMETER: 1.53 CM
DOP CALCLVOT PEAK VEL VTI: 23.6 CM
E WAVE DECELERATION TIME: 395 MS
E/A RATIO: 0.59
E/E' RATIO: 8 M/S
ECHO EF ESTIMATED: 64 %
ECHO LV POSTERIOR WALL: 0.7 CM (ref 0.6–1.1)
EJECTION FRACTION: 65 %
FRACTIONAL SHORTENING: 32.4 % (ref 28–44)
INTERVENTRICULAR SEPTUM: 0.9 CM (ref 0.6–1.1)
IVC DIAMETER: 1.3 CM
IVRT: 103 MS
LA MAJOR: 4.4 CM
LA MINOR: 4.9 CM
LA WIDTH: 3.2 CM
LEFT ATRIUM SIZE: 3.3 CM
LEFT ATRIUM VOLUME INDEX: 23 ML/M2
LEFT ATRIUM VOLUME: 42 CM3
LEFT INTERNAL DIMENSION IN SYSTOLE: 2.5 CM (ref 2.1–4)
LEFT VENTRICLE DIASTOLIC VOLUME INDEX: 32.07 ML/M2
LEFT VENTRICLE DIASTOLIC VOLUME: 59 ML
LEFT VENTRICLE MASS INDEX: 44.7 G/M2
LEFT VENTRICLE SYSTOLIC VOLUME INDEX: 12 ML/M2
LEFT VENTRICLE SYSTOLIC VOLUME: 22 ML
LEFT VENTRICULAR INTERNAL DIMENSION IN DIASTOLE: 3.7 CM (ref 3.5–6)
LEFT VENTRICULAR MASS: 82.3 G
LV LATERAL E/E' RATIO: 6.5
LV SEPTAL E/E' RATIO: 10.4
MV A" WAVE DURATION": 88.49 MS
MV PEAK A VEL: 0.88 M/S
MV PEAK E VEL: 0.52 M/S
OHS CV CPX 1 MINUTE RECOVERY HEART RATE: 105 BPM
OHS CV CPX 85 PERCENT MAX PREDICTED HEART RATE MALE: 119
OHS CV CPX MAX PREDICTED HEART RATE: 140
OHS CV CPX PATIENT IS FEMALE: 1
OHS CV CPX PATIENT IS MALE: 0
OHS CV CPX PEAK DIASTOLIC BLOOD PRESSURE: 63 MMHG
OHS CV CPX PEAK HEAR RATE: 115 BPM
OHS CV CPX PEAK RATE PRESSURE PRODUCT: NORMAL
OHS CV CPX PEAK SYSTOLIC BLOOD PRESSURE: 138 MMHG
OHS CV CPX PERCENT MAX PREDICTED HEART RATE ACHIEVED: 85
OHS CV CPX RATE PRESSURE PRODUCT PRESENTING: 9963
OHS CV INITIAL DOSE: 10 MCG/KG/MIN
OHS CV PEAK DOSE: 30 MCG/KG/MIN
OHS CV RV/LV RATIO: 0.84 CM
PISA TR MAX VEL: 2.3 M/S
POST STRESS EJECTION FRACTION: 73 %
PULM VEIN A" WAVE DURATION": 88.49 MS
PULM VEIN S/D RATIO: 1.92
PULMONIC VEIN PEAK A VELOCITY: 0.4 M/S
PV PEAK D VEL: 0.24 M/S
PV PEAK S VEL: 0.46 M/S
RA MAJOR: 4.52 CM
RA PRESSURE ESTIMATED: 3 MMHG
RA WIDTH: 2.94 CM
RIGHT VENTRICLE DIASTOLIC BASEL DIMENSION: 3.1 CM
RV TB RVSP: 5 MMHG
RV TISSUE DOPPLER FREE WALL SYSTOLIC VELOCITY 1 (APICAL 4 CHAMBER VIEW): 12.61 CM/S
SINUS: 2.89 CM
STJ: 2.3 CM
SYSTOLIC BLOOD PRESSURE: 123 MMHG
TDI LATERAL: 0.08 M/S
TDI SEPTAL: 0.05 M/S
TDI: 0.07 M/S
TRICUSPID ANNULAR PLANE SYSTOLIC EXCURSION: 1.9 CM
TV PEAK GRADIENT: 22 MMHG
TV REST PULMONARY ARTERY PRESSURE: 24 MMHG
Z-SCORE OF LEFT VENTRICULAR DIMENSION IN END DIASTOLE: -3.22
Z-SCORE OF LEFT VENTRICULAR DIMENSION IN END SYSTOLE: -1.83

## 2025-05-12 PROCEDURE — 25500020 PHARM REV CODE 255: Performed by: INTERNAL MEDICINE

## 2025-05-12 PROCEDURE — 93325 DOPPLER ECHO COLOR FLOW MAPG: CPT | Mod: 26,,, | Performed by: INTERNAL MEDICINE

## 2025-05-12 PROCEDURE — 93320 DOPPLER ECHO COMPLETE: CPT | Mod: 26,,, | Performed by: INTERNAL MEDICINE

## 2025-05-12 PROCEDURE — 93351 STRESS TTE COMPLETE: CPT

## 2025-05-12 PROCEDURE — 93351 STRESS TTE COMPLETE: CPT | Mod: 26,,, | Performed by: INTERNAL MEDICINE

## 2025-05-12 PROCEDURE — 63600175 PHARM REV CODE 636 W HCPCS: Performed by: INTERNAL MEDICINE

## 2025-05-12 RX ORDER — DOBUTAMINE HYDROCHLORIDE 400 MG/100ML
40 INJECTION, SOLUTION INTRAVENOUS
Status: COMPLETED | OUTPATIENT
Start: 2025-05-12 | End: 2025-05-12

## 2025-05-12 RX ADMIN — DOBUTAMINE 40 MCG/KG/MIN: 12.5 INJECTION, SOLUTION, CONCENTRATE INTRAVENOUS at 04:05

## 2025-05-12 RX ADMIN — HUMAN ALBUMIN MICROSPHERES AND PERFLUTREN 0.66 MG: 10; .22 INJECTION, SOLUTION INTRAVENOUS at 04:05

## 2025-05-12 NOTE — PROGRESS NOTES
The test was excellent heart looked great at rest and with stress and as I told her in the office and as I suspected,.there was no evidence or a septal infarction ( heart attack in the past).

## 2025-08-12 DIAGNOSIS — E78.5 DYSLIPIDEMIA: ICD-10-CM

## 2025-08-12 DIAGNOSIS — Z78.9 STATIN INTOLERANCE: ICD-10-CM

## 2025-08-12 RX ORDER — PRAVASTATIN SODIUM 40 MG/1
40 TABLET ORAL DAILY
Qty: 90 TABLET | Refills: 3 | Status: SHIPPED | OUTPATIENT
Start: 2025-08-12

## 2025-08-12 RX ORDER — EZETIMIBE 10 MG/1
10 TABLET ORAL DAILY
Qty: 90 TABLET | Refills: 3 | Status: SHIPPED | OUTPATIENT
Start: 2025-08-12

## (undated) DEVICE — TRAY FOLEY 16FR INFECTION CONT

## (undated) DEVICE — TROCAR ENDOPATH XCEL 12X100MM

## (undated) DEVICE — ELECTRODE REM PLYHSV RETURN 9

## (undated) DEVICE — NDL HYPO REG 25G X 1 1/2

## (undated) DEVICE — DRAPE ABDOMINAL TIBURON 14X11

## (undated) DEVICE — CART STAPLE RELD 45MM WHT

## (undated) DEVICE — WARMER DRAPE STERILE LF

## (undated) DEVICE — SOL NS 1000CC

## (undated) DEVICE — TRAY MINOR GEN SURG

## (undated) DEVICE — TROCAR ENDOPATH XCEL 5X75MM

## (undated) DEVICE — SEALER LIGASURE LAP 37CM 5MM

## (undated) DEVICE — IRRIGATOR ENDOSCOPY DISP.

## (undated) DEVICE — BLADE SURG CARBON STEEL SZ11

## (undated) DEVICE — BAG TISS RETRV MONARCH 10MM

## (undated) DEVICE — SUT MCRYL PLUS 4-0 PS2 27IN

## (undated) DEVICE — KIT ANTIFOG

## (undated) DEVICE — SUT 0 VICRYL / UR6 (J603)

## (undated) DEVICE — TUBING HF INSUFFLATION W/ FLTR

## (undated) DEVICE — SEE MEDLINE ITEM 152622

## (undated) DEVICE — ADHESIVE DERMABOND ADVANCED

## (undated) DEVICE — DRAPE STERI INSTRUMENT 1018

## (undated) DEVICE — STAPLER INT LINEAR ARTC 3.5-45